# Patient Record
Sex: FEMALE | Race: WHITE | Employment: OTHER | URBAN - METROPOLITAN AREA
[De-identification: names, ages, dates, MRNs, and addresses within clinical notes are randomized per-mention and may not be internally consistent; named-entity substitution may affect disease eponyms.]

---

## 2017-03-15 ENCOUNTER — HOSPITAL ENCOUNTER (OUTPATIENT)
Dept: CT IMAGING | Age: 72
Discharge: HOME | End: 2017-03-15
Attending: INTERNAL MEDICINE
Payer: MEDICARE

## 2017-03-15 DIAGNOSIS — M79.671 PAIN OF RIGHT HEEL: ICD-10-CM

## 2017-03-15 PROCEDURE — 73700 CT LOWER EXTREMITY W/O DYE: CPT

## 2017-03-19 NOTE — PROGRESS NOTES
Please call patient, heel spur and achilles tendonitis. Will make an orthopedics consultation. No fractures. If dramatically improved, she can defer consult, but since this issue had been ongoing for  A while now, this my be of some help.

## 2017-04-26 ENCOUNTER — HOSPITAL ENCOUNTER (OUTPATIENT)
Dept: PHYSICAL THERAPY | Age: 72
Discharge: HOME | End: 2017-04-26
Payer: MEDICARE

## 2017-04-26 PROCEDURE — 97161 PT EVAL LOW COMPLEX 20 MIN: CPT

## 2017-04-26 PROCEDURE — G8978 MOBILITY CURRENT STATUS: HCPCS

## 2017-04-26 PROCEDURE — 97016 VASOPNEUMATIC DEVICE THERAPY: CPT

## 2017-04-26 PROCEDURE — G8979 MOBILITY GOAL STATUS: HCPCS

## 2017-04-26 NOTE — PROGRESS NOTES
Ambulatory/Rehab Services H2 Model Falls Risk Assessment    Risk Factor Pts. ·   Confusion/Disorientation/Impulsivity  []    4 ·   Symptomatic Depression  []   2 ·   Altered Elimination  []   1 ·   Dizziness/Vertigo  []   1 ·   Gender (Male)  []   1 ·   Any administered antiepileptics (anticonvulsants):  []   2 ·   Any administered benzodiazepines:  []   1 ·   Visual Impairment (specify):  []   1 ·   Portable Oxygen Use  []   1 ·   Orthostatic ? BP  []   1 ·   History of Recent Falls (within 3 mos.)  []   5     Ability to Rise from Chair (choose one) Pts. ·   Ability to rise in a single movement  [x]   0 ·   Pushes up, successful in one attempt  []   1 ·   Multiple attempts, but successful  []   3 ·   Unable to rise without assistance  []   4   Total: (5 or greater = High Risk) 0     Falls Prevention Plan:   []                Physical Limitations to Exercise (specify):   []                Mobility Assistance Device (type):   []                Exercise/Equipment Adaptation (specify):    ©2010 [facility] [address] Patent #[id number].  Federal Law prohibits the replication, distribution or use without written permission from [facility] [employer]

## 2017-04-26 NOTE — PROGRESS NOTES
[place]  : 1945 [facility] at [facility]  [address], [address], [place] [address]  [phone]   [phone]          OUTPATIENT PHYSICAL THERAPY:Initial Assessment 2017    ICD-10: Treatment Diagnosis: Pain in right foot (M79.671); Difficulty in walking, not elsewhere classified (R26.2)  Precautions/Allergies:   Pcn [penicillins]   Fall Risk Score: 0 (? 5 = High Risk)  MD Orders: evaluate and treat MEDICAL/REFERRING DIAGNOSIS:  pain in right foot   DATE OF ONSET: Chronic   REFERRING PHYSICIAN: Mariia Johnson MD  RETURN PHYSICIAN APPOINTMENT: 2 weeks     INITIAL ASSESSMENT:  Ms. Ephraim Cook presents in therapy today with reports of R foot/cayla pain and imaging diagnosis of heel spur and calcific tendinitis of Achilles. She will benefit from skilled PT in order to return to her previous and full level of functional mobility. PROBLEM LIST (Impacting functional limitations):  1. Decreased Strength  2. Decreased ADL/Functional Activities  3. Decreased Ambulation Ability/Technique  4. Decreased Balance  5. Increased Pain  6. Decreased Activity Tolerance  7. Decreased Flexibility/Joint Mobility  8. Decreased [place] with Home Exercise Program INTERVENTIONS PLANNED:  1. Balance Exercise  2. Cold  3. Gait Training  4. Heat  5. Home Exercise Program (HEP)  6. Manual Therapy  7. Neuromuscular Re-education/Strengthening  8. Range of Motion (ROM)  9. Therapeutic Activites  10. Therapeutic Exercise/Strengthening   TREATMENT PLAN:  Effective Dates: 17 TO 17. Frequency/Duration: 2 times a week for 6 weeks  GOALS: (Goals have been discussed and agreed upon with patient.)    SHORT-TERM FUNCTIONAL GOALS: Time Frame: 3 weeks  1. Patient will be compliant with HEP focused on lower extremity strengthening and ROM. 2.  Patient will rate R LE pain no greater than 3-4/10 for improved tolerance to daily and work duties.      DISCHARGE GOALS: Time Frame: 6 weeks  1. Patient will be independent with comprehensive HEP focused on continued performance of lower extremity strengthening and ROM activities. 2.  Patient will rate R LE pain no greater than 1-2/10 and which does not significantly interfere with daily or work duties for return to previous level of function. 3.  Patient will demonstrate near symmetrical bilateral LE AROM for optimum functional mobility with daily and work duties. 4.  Patient will demonstrate near symmetrical bilateral LE strength grades in the above tested planes for optimum performance and safety with daily and work duties. Rehabilitation Potential For Stated Goals: [address] therapy, I certify that the treatment plan above will be carried out by a therapist or under their direction. Thank you for this referral,  Ryan Powell, PT, DPT     Referring Physician Signature: Rommel Correa MD              Date                    The information in this section was collected on 4-26-17 (except where otherwise noted). HISTORY:   History of Present Injury/Illness (Reason for Referral):  Patient reports very chronic history of R foot/ankle pain, which was worsening until referral to specialist. She saw Dr. Karly Smith and was placed in a boot after CT imaging revealed Achilles calcific tendinitis and a heel spur. She states this boot has significantly decreased her symptoms. Past Medical History/Comorbidities:   Ms. Santhosh Cannon  has a past medical history of Abnormal Pap smear of cervix; Arrhythmia; Bacteremia; Cardiac pacemaker (7/15/2016); Chronic atrial fibrillation ([address] (2/17/2016); Chronic renal failure; Chronic systolic heart failure ([address] Dyslipidemia; Endocarditis; Heart failure ([address] History of aortic valve replacement with bioprosthetic valve (7/15/2016); History of prosthetic heart valve (2010); bacterial endocarditis (2012); OTHER MEDICAL;  Hypertension; Mechanical heart valve present (2001); Pacemaker (2010); Permanent atrial fibrillation ([facility] and Tricuspid valve disorder,tr,ti,ts non rhe. Ms. Saranya Short  has a past surgical history that includes cardiac surg procedure unlist; pacemaker; tonsillectomy; aortic valve replacement (2009); mitral valve replacement (2001); and other surgical.  Social History/Living Environment:     Independent   Prior Level of Function/Work/Activity:  Independent and active   Personal Factors:          Sex:  female        Age:  80 y.o. Current Medications:       Current Outpatient Prescriptions:     diclofenac (VOLTAREN) 1 % gel, Apply  to affected area three (3) times daily as needed. , Disp: 1 Each, Rfl: 0    warfarin (JANTOVEN) 5 mg tablet, 1/2-1 Tablet As Directed, Disp: 90 Tab, Rfl: 3    carvedilol (COREG) 12.5 mg tablet, Take 1 Tab by mouth two (2) times a day., Disp: 180 Tab, Rfl: 3    furosemide (LASIX) 20 mg tablet, 1 qd, Disp: 90 Tab, Rfl: 3    potassium chloride SR (KLOR-CON 10) 10 mEq tablet, Take 1 Tab by mouth daily. , Disp: 90 Tab, Rfl: 3    losartan (COZAAR) 25 mg tablet, Take 1 Tab by mouth daily. , Disp: 90 Tab, Rfl: 3    cefadroxil (DURICEF) 500 mg capsule, Take 1 Cap by mouth two (2) times a day., Disp: 180 Cap, Rfl: 3    levothyroxine (SYNTHROID) 50 mcg tablet, Take 1 Tab by mouth Daily (before breakfast). , Disp: 90 Tab, Rfl: 3    omega-3 fatty acids-vitamin e (FISH OIL) 1,000 mg cap, Take 1 Cap by mouth daily. , Disp: , Rfl:     multivitamins-minerals-lutein (CENTRUM SILVER) tab, Take  by mouth daily. , Disp: , Rfl:     aspirin delayed-release 81 mg tablet, Take 81 mg by mouth daily. , Disp: , Rfl:    Date Last Reviewed:  4/26/2017     Number of Personal Factors/Comorbidities that affect the Plan of Care: 0: LOW COMPLEXITY   EXAMINATION:   Observation/Orthostatic Postural Assessment:          Patient ambulates in R boot, appears in healthy condition, good gait speed;  No significant edema or redness present at R foot/ankle   Palpation:          Mild tenderness at distal R Achilles, negative windlass and good ray mobility  ROM:          75% of normal in all directions at R foot/ankle  Strength:          4+/5 in all directions at R foot/ankle    Body Structures Involved:  1. Nerves  2. Bones  3. Joints  4. Muscles Body Functions Affected:  1. Sensory/Pain  2. Movement Related Activities and Participation Affected:  1. General Tasks and Demands  2. Mobility  3. Self Care  4. Domestic Life  5. Interpersonal Interactions and Relationships  6. Community, Social and [place]   Number of elements (examined above) that affect the Plan of Care: 1-2: LOW COMPLEXITY   CLINICAL PRESENTATION:   Presentation: Stable and uncomplicated: LOW COMPLEXITY   CLINICAL DECISION MAKING:   Outcome Measure: Tool Used: PT/OT FOOT AND ANKLE ABILITY MEASURE  Score:  Initial: 66 Most Recent: X (Date: -- )   Interpretation of Score: For the \"Activities of Daily Living\", there are 21 questions each scored on a 5 point scale with 0 representing \"Unable to do\" and 4 representing \"No difficulty\". The lower the score, the greater the functional disability. 84/84 represents no disability. Minimal detectable change is 5.7 points. With the addition of the 8 questions in the \"Sports Subscale,\" there are 29 questions, each scored on a 5 point scale with 0 representing \"Unable to do\" and 4 representing \"No difficulty\". The lower the score, the greater the functional disability. 116/116 represents no disability. Minimal detectable change is 12.3 points. Activities of Daily Living:  Score 84 83-68 67-51 50-34 33-18 17-1 0   Modifier CH CI CJ CK CL CM CN     Activities of Daily Living + Sports Subscale:  Score 116 115-94 93-71 70-47 46-24 23-1 0   Modifier CH CI CJ CK CL CM CN     ?  Mobility - Walking and Moving Around:     - CURRENT STATUS: CJ - 20%-39% impaired, limited or restricted    - GOAL STATUS: CI - 1%-19% impaired, limited or restricted    - D/C STATUS:  ---------------To be determined---------------    Medical Necessity:   · Patient is expected to demonstrate progress in strength and range of motion to improve safety during functional mobility. Reason for Services/Other Comments:  · Patient continues to require skilled intervention due to not reaching long term goals. Use of outcome tool(s) and clinical judgement create a POC that gives a: Clear prediction of patient's progress: LOW COMPLEXITY            TREATMENT:   (In addition to Assessment/Re-Assessment sessions the following treatments were rendered)  Pre-treatment Symptoms/Complaints:  Patient reports tenderness at R achilles, and difficulty with prolonged walking and activity. She wears a R boot which she states has helped her pain a lot. She rates pain as 4-5/10. No sensory deficits reported. Pain: Initial:     4/10 Post Session:  2-3/10     THERAPEUTIC EXERCISE: (0 minutes):  Exercises per grid below to improve mobility and strength. Required minimal verbal cues to promote proper body alignment, promote proper body posture and promote proper body mechanics. Progressed complexity of movement as indicated. Date:   Date:   Date:     Activity/Exercise Parameters Parameters Parameters                                               Patient received vasopneumatic compression via [employer] to R foot/ankle, medium compression, 38 degrees, 15 minutes, to reduce pain and inflammation at affected LE, patient supine and responded very well to treatment    Treatment/Session Assessment:    · Response to Treatment:  Patient responded well to vasopneumatic compression today, and verbalized understanding of HEP consisting of calf stretching and ankle strengthening. · Compliance with Program/Exercises: Will assess as treatment progresses. · Recommendations/Intent for next treatment session: \"Next visit will focus on advancements to more challenging activities\".   Total Treatment Duration:  PT Patient Time In/Time Out  Time In: 1000  Time Out: [address] [facility] [address], PT, DPT

## 2017-05-01 ENCOUNTER — HOSPITAL ENCOUNTER (OUTPATIENT)
Dept: PHYSICAL THERAPY | Age: 72
Discharge: HOME | End: 2017-05-01
Payer: MEDICARE

## 2017-05-01 PROCEDURE — 97110 THERAPEUTIC EXERCISES: CPT

## 2017-05-01 PROCEDURE — 97140 MANUAL THERAPY 1/> REGIONS: CPT

## 2017-05-01 NOTE — PROGRESS NOTES
[place]  : 1945 [facility] Center at [facility]  [address], [address], [place]  [phone]   [phone]          OUTPATIENT PHYSICAL THERAPY:Daily Note 2017    ICD-10: Treatment Diagnosis: Pain in right foot (M79.671); Difficulty in walking, not elsewhere classified (R26.2)  Precautions/Allergies:   Pcn [penicillins]   Fall Risk Score: 0 (? 5 = High Risk)  MD Orders: evaluate and treat MEDICAL/REFERRING DIAGNOSIS:  pain in right foot   DATE OF ONSET: Chronic   REFERRING PHYSICIAN: Janet Farnsworth MD  RETURN PHYSICIAN APPOINTMENT: 2 weeks     INITIAL ASSESSMENT:  Ms. Yan Calvo presents in therapy today with reports of R foot/cayla pain and imaging diagnosis of heel spur and calcific tendinitis of Achilles. She will benefit from skilled PT in order to return to her previous and full level of functional mobility. PROBLEM LIST (Impacting functional limitations):  1. Decreased Strength  2. Decreased ADL/Functional Activities  3. Decreased Ambulation Ability/Technique  4. Decreased Balance  5. Increased Pain  6. Decreased Activity Tolerance  7. Decreased Flexibility/Joint Mobility  8. Decreased [place] with Home Exercise Program INTERVENTIONS PLANNED:  1. Balance Exercise  2. Cold  3. Gait Training  4. Heat  5. Home Exercise Program (HEP)  6. Manual Therapy  7. Neuromuscular Re-education/Strengthening  8. Range of Motion (ROM)  9. Therapeutic Activites  10. Therapeutic Exercise/Strengthening   TREATMENT PLAN:  Effective Dates: 17 TO 17. Frequency/Duration: 2 times a week for 6 weeks  GOALS: (Goals have been discussed and agreed upon with patient.)    SHORT-TERM FUNCTIONAL GOALS: Time Frame: 3 weeks  1. Patient will be compliant with HEP focused on lower extremity strengthening and ROM. 2.  Patient will rate R LE pain no greater than 3-4/10 for improved tolerance to daily and work duties.      DISCHARGE GOALS: Time Frame: 6 weeks  1. Patient will be independent with comprehensive HEP focused on continued performance of lower extremity strengthening and ROM activities. 2.  Patient will rate R LE pain no greater than 1-2/10 and which does not significantly interfere with daily or work duties for return to previous level of function. 3.  Patient will demonstrate near symmetrical bilateral LE AROM for optimum functional mobility with daily and work duties. 4.  Patient will demonstrate near symmetrical bilateral LE strength grades in the above tested planes for optimum performance and safety with daily and work duties. Rehabilitation Potential For Stated Goals: [facility] [address] therapy, I certify that the treatment plan above will be carried out by a therapist or under their direction. Thank you for this referral,  Michael Craig, PT, DPT     Referring Physician Signature: Jean-Paul Jacobo MD              Date                    The information in this section was collected on 4-26-17 (except where otherwise noted). HISTORY:   History of Present Injury/Illness (Reason for Referral):  Patient reports very chronic history of R foot/ankle pain, which was worsening until referral to specialist. She saw Dr. Judith Weston and was placed in a boot after CT imaging revealed Achilles calcific tendinitis and a heel spur. She states this boot has significantly decreased her symptoms. Past Medical History/Comorbidities:   Ms. Rios Aguilera  has a past medical history of Abnormal Pap smear of cervix; Arrhythmia; Bacteremia; Cardiac pacemaker (7/15/2016); Chronic atrial fibrillation ([address] (2/17/2016); Chronic renal failure; Chronic systolic heart failure ([address] Dyslipidemia; Endocarditis; Heart failure ([address] History of aortic valve replacement with bioprosthetic valve (7/15/2016); History of prosthetic heart valve (2010); bacterial endocarditis (2012); OTHER MEDICAL;  Hypertension; Mechanical heart valve present (2001); Pacemaker (2010); Permanent atrial fibrillation ([facility] and Tricuspid valve disorder,tr,ti,ts non rhe. Ms. Yan Calvo  has a past surgical history that includes cardiac surg procedure unlist; pacemaker; tonsillectomy; aortic valve replacement (2009); mitral valve replacement (2001); and other surgical.  Social History/Living Environment:     Independent   Prior Level of Function/Work/Activity:  Independent and active   Personal Factors:          Sex:  female        Age:  80 y.o. Current Medications:       Current Outpatient Prescriptions:     diclofenac (VOLTAREN) 1 % gel, Apply  to affected area three (3) times daily as needed. , Disp: 1 Each, Rfl: 0    warfarin (JANTOVEN) 5 mg tablet, 1/2-1 Tablet As Directed, Disp: 90 Tab, Rfl: 3    carvedilol (COREG) 12.5 mg tablet, Take 1 Tab by mouth two (2) times a day., Disp: 180 Tab, Rfl: 3    furosemide (LASIX) 20 mg tablet, 1 qd, Disp: 90 Tab, Rfl: 3    potassium chloride SR (KLOR-CON 10) 10 mEq tablet, Take 1 Tab by mouth daily. , Disp: 90 Tab, Rfl: 3    losartan (COZAAR) 25 mg tablet, Take 1 Tab by mouth daily. , Disp: 90 Tab, Rfl: 3    cefadroxil (DURICEF) 500 mg capsule, Take 1 Cap by mouth two (2) times a day., Disp: 180 Cap, Rfl: 3    levothyroxine (SYNTHROID) 50 mcg tablet, Take 1 Tab by mouth Daily (before breakfast). , Disp: 90 Tab, Rfl: 3    omega-3 fatty acids-vitamin e (FISH OIL) 1,000 mg cap, Take 1 Cap by mouth daily. , Disp: , Rfl:     multivitamins-minerals-lutein (CENTRUM SILVER) tab, Take  by mouth daily. , Disp: , Rfl:     aspirin delayed-release 81 mg tablet, Take 81 mg by mouth daily. , Disp: , Rfl:    Date Last Reviewed:  5/1/2017     Number of Personal Factors/Comorbidities that affect the Plan of Care: 0: LOW COMPLEXITY   EXAMINATION:   Observation/Orthostatic Postural Assessment:          Patient ambulates in R boot, appears in healthy condition, good gait speed;  No significant edema or redness present at R foot/ankle   Palpation:          Mild tenderness at distal R Achilles, negative windlass and good ray mobility  ROM:          75% of normal in all directions at R foot/ankle  Strength:          4+/5 in all directions at R foot/ankle    Body Structures Involved:  1. Nerves  2. Bones  3. Joints  4. Muscles Body Functions Affected:  1. Sensory/Pain  2. Movement Related Activities and Participation Affected:  1. General Tasks and Demands  2. Mobility  3. Self Care  4. Domestic Life  5. Interpersonal Interactions and Relationships  6. Community, Social and [place]   Number of elements (examined above) that affect the Plan of Care: 1-2: LOW COMPLEXITY   CLINICAL PRESENTATION:   Presentation: Stable and uncomplicated: LOW COMPLEXITY   CLINICAL DECISION MAKING:   Outcome Measure: Tool Used: PT/OT FOOT AND ANKLE ABILITY MEASURE  Score:  Initial: 66 Most Recent: X (Date: -- )   Interpretation of Score: For the \"Activities of Daily Living\", there are 21 questions each scored on a 5 point scale with 0 representing \"Unable to do\" and 4 representing \"No difficulty\". The lower the score, the greater the functional disability. 84/84 represents no disability. Minimal detectable change is 5.7 points. With the addition of the 8 questions in the \"Sports Subscale,\" there are 29 questions, each scored on a 5 point scale with 0 representing \"Unable to do\" and 4 representing \"No difficulty\". The lower the score, the greater the functional disability. 116/116 represents no disability. Minimal detectable change is 12.3 points. Activities of Daily Living:  Score 84 83-68 67-51 50-34 33-18 17-1 0   Modifier CH CI CJ CK CL CM CN     Activities of Daily Living + Sports Subscale:  Score 116 115-94 93-71 70-47 46-24 23-1 0   Modifier CH CI CJ CK CL CM CN     ?  Mobility - Walking and Moving Around:     - CURRENT STATUS: CJ - 20%-39% impaired, limited or restricted    - GOAL STATUS: CI - 1%-19% impaired, limited or restricted    - D/C STATUS:  ---------------To be determined---------------    Medical Necessity:   · Patient is expected to demonstrate progress in strength and range of motion to improve safety during functional mobility. Reason for Services/Other Comments:  · Patient continues to require skilled intervention due to not reaching long term goals. Use of outcome tool(s) and clinical judgement create a POC that gives a: Clear prediction of patient's progress: LOW COMPLEXITY            TREATMENT:   (In addition to Assessment/Re-Assessment sessions the following treatments were rendered)  Pre-treatment Symptoms/Complaints:  Patient reports she is doing well, but notices stiffness and discomfort with standing calf stretch against wall. Pain: Initial:     4/10 Post Session:  2-3/10     THERAPEUTIC EXERCISE: (15 minutes):  Exercises per grid below to improve mobility and strength. Required minimal verbal cues to promote proper body alignment, promote proper body posture and promote proper body mechanics. Progressed complexity of movement as indicated. Date:  5-1-17 Date:   Date:     Activity/Exercise Parameters Parameters Parameters   Stepper   8 minutes  Level 1  Cool down     Ankle t-band    Black band   x20 each             Step-ups                                   Manual therapy (30 minutes): Patient received AP and PA talocrural joint mobilizations, grades II-III, x5 each direction for 30 seconds; Patient received manual STM to calf and achilles tendon, 10 minutes, patient prone, to reduce stiffness and pain at the affected LE      Treatment/Session Assessment:    · Response to Treatment:  Patient did well with therapy today, reporting improved pain following manual therapy. Will continue to progress as tolerated. · Compliance with Program/Exercises: Will assess as treatment progresses. · Recommendations/Intent for next treatment session: \"Next visit will focus on advancements to more challenging activities\".   Total Treatment Duration:  PT Patient Time In/Time Out  Time In: 1115  Time Out: [address], PT, DPT

## 2017-05-05 ENCOUNTER — HOSPITAL ENCOUNTER (OUTPATIENT)
Dept: PHYSICAL THERAPY | Age: 72
Discharge: HOME | End: 2017-05-05
Payer: MEDICARE

## 2017-05-05 PROCEDURE — 97140 MANUAL THERAPY 1/> REGIONS: CPT

## 2017-05-05 PROCEDURE — 97110 THERAPEUTIC EXERCISES: CPT

## 2017-05-05 NOTE — PROGRESS NOTES
[place]  : 1945 Therapy Center at [facility]  [address], [address], [place] [address]  [phone]   [phone]          OUTPATIENT PHYSICAL THERAPY:Daily Note 2017    ICD-10: Treatment Diagnosis: Pain in right foot (M79.671); Difficulty in walking, not elsewhere classified (R26.2)  Precautions/Allergies:   Pcn [penicillins]   Fall Risk Score: 0 (? 5 = High Risk)  MD Orders: evaluate and treat MEDICAL/REFERRING DIAGNOSIS:  pain in right foot   DATE OF ONSET: Chronic   REFERRING PHYSICIAN: Peter Olivares MD  RETURN PHYSICIAN APPOINTMENT: 2 weeks     INITIAL ASSESSMENT:  Ms. Trice Chao presents in therapy today with reports of R foot/cayla pain and imaging diagnosis of heel spur and calcific tendinitis of Achilles. She will benefit from skilled PT in order to return to her previous and full level of functional mobility. PROBLEM LIST (Impacting functional limitations):  1. Decreased Strength  2. Decreased ADL/Functional Activities  3. Decreased Ambulation Ability/Technique  4. Decreased Balance  5. Increased Pain  6. Decreased Activity Tolerance  7. Decreased Flexibility/Joint Mobility  8. Decreased [place] with Home Exercise Program INTERVENTIONS PLANNED:  1. Balance Exercise  2. Cold  3. Gait Training  4. Heat  5. Home Exercise Program (HEP)  6. Manual Therapy  7. Neuromuscular Re-education/Strengthening  8. Range of Motion (ROM)  9. Therapeutic Activites  10. Therapeutic Exercise/Strengthening   TREATMENT PLAN:  Effective Dates: 17 TO 17. Frequency/Duration: 2 times a week for 6 weeks  GOALS: (Goals have been discussed and agreed upon with patient.)    SHORT-TERM FUNCTIONAL GOALS: Time Frame: 3 weeks  1. Patient will be compliant with HEP focused on lower extremity strengthening and ROM. 2.  Patient will rate R LE pain no greater than 3-4/10 for improved tolerance to daily and work duties.      DISCHARGE GOALS: Time Frame: 6 weeks  1. Patient will be independent with comprehensive HEP focused on continued performance of lower extremity strengthening and ROM activities. 2.  Patient will rate R LE pain no greater than 1-2/10 and which does not significantly interfere with daily or work duties for return to previous level of function. 3.  Patient will demonstrate near symmetrical bilateral LE AROM for optimum functional mobility with daily and work duties. 4.  Patient will demonstrate near symmetrical bilateral LE strength grades in the above tested planes for optimum performance and safety with daily and work duties. Rehabilitation Potential For Stated Goals: [address] therapy, I certify that the treatment plan above will be carried out by a therapist or under their direction. Thank you for this referral,  Roberta Rossi, PT, DPT     Referring Physician Signature: Pily Topete MD              Date                    The information in this section was collected on 4-26-17 (except where otherwise noted). HISTORY:   History of Present Injury/Illness (Reason for Referral):  Patient reports very chronic history of R foot/ankle pain, which was worsening until referral to specialist. She saw Dr. Aaron Alexandre and was placed in a boot after CT imaging revealed Achilles calcific tendinitis and a heel spur. She states this boot has significantly decreased her symptoms. Past Medical History/Comorbidities:   Ms. Juan José Baker  has a past medical history of Abnormal Pap smear of cervix; Arrhythmia; Bacteremia; Cardiac pacemaker (7/15/2016); Chronic atrial fibrillation ([address] (2/17/2016); Chronic renal failure; Chronic systolic heart failure ([address] Dyslipidemia; Endocarditis; Heart failure ([address] History of aortic valve replacement with bioprosthetic valve (7/15/2016); History of prosthetic heart valve (2010); bacterial endocarditis (2012); OTHER MEDICAL;  Hypertension; Mechanical heart valve present (2001); Pacemaker (2010); Permanent atrial fibrillation ([facility] [address] and Tricuspid valve disorder,tr,ti,ts non rhe. Ms. Robbie Moreno  has a past surgical history that includes cardiac surg procedure unlist; pacemaker; tonsillectomy; aortic valve replacement (2009); mitral valve replacement (2001); and other surgical.  Social History/Living Environment:     Independent   Prior Level of Function/Work/Activity:  Independent and active   Personal Factors:          Sex:  female        Age:  80 y.o. Current Medications:       Current Outpatient Prescriptions:     diclofenac (VOLTAREN) 1 % gel, Apply  to affected area three (3) times daily as needed. , Disp: 1 Each, Rfl: 0    warfarin (JANTOVEN) 5 mg tablet, 1/2-1 Tablet As Directed, Disp: 90 Tab, Rfl: 3    carvedilol (COREG) 12.5 mg tablet, Take 1 Tab by mouth two (2) times a day., Disp: 180 Tab, Rfl: 3    furosemide (LASIX) 20 mg tablet, 1 qd, Disp: 90 Tab, Rfl: 3    potassium chloride SR (KLOR-CON 10) 10 mEq tablet, Take 1 Tab by mouth daily. , Disp: 90 Tab, Rfl: 3    losartan (COZAAR) 25 mg tablet, Take 1 Tab by mouth daily. , Disp: 90 Tab, Rfl: 3    cefadroxil (DURICEF) 500 mg capsule, Take 1 Cap by mouth two (2) times a day., Disp: 180 Cap, Rfl: 3    levothyroxine (SYNTHROID) 50 mcg tablet, Take 1 Tab by mouth Daily (before breakfast). , Disp: 90 Tab, Rfl: 3    omega-3 fatty acids-vitamin e (FISH OIL) 1,000 mg cap, Take 1 Cap by mouth daily. , Disp: , Rfl:     multivitamins-minerals-lutein (CENTRUM SILVER) tab, Take  by mouth daily. , Disp: , Rfl:     aspirin delayed-release 81 mg tablet, Take 81 mg by mouth daily. , Disp: , Rfl:    Date Last Reviewed:  5/5/2017     Number of Personal Factors/Comorbidities that affect the Plan of Care: 0: LOW COMPLEXITY   EXAMINATION:   Observation/Orthostatic Postural Assessment:          Patient ambulates in R boot, appears in healthy condition, good gait speed;  No significant edema or redness present at R foot/ankle   Palpation:          Mild tenderness at distal R Achilles, negative windlass and good ray mobility  ROM:          75% of normal in all directions at R foot/ankle  Strength:          4+/5 in all directions at R foot/ankle    Body Structures Involved:  1. Nerves  2. Bones  3. Joints  4. Muscles Body Functions Affected:  1. Sensory/Pain  2. Movement Related Activities and Participation Affected:  1. General Tasks and Demands  2. Mobility  3. Self Care  4. Domestic Life  5. Interpersonal Interactions and Relationships  6. Community, Social and [place]   Number of elements (examined above) that affect the Plan of Care: 1-2: LOW COMPLEXITY   CLINICAL PRESENTATION:   Presentation: Stable and uncomplicated: LOW COMPLEXITY   CLINICAL DECISION MAKING:   Outcome Measure: Tool Used: PT/OT FOOT AND ANKLE ABILITY MEASURE  Score:  Initial: 66 Most Recent: X (Date: -- )   Interpretation of Score: For the \"Activities of Daily Living\", there are 21 questions each scored on a 5 point scale with 0 representing \"Unable to do\" and 4 representing \"No difficulty\". The lower the score, the greater the functional disability. 84/84 represents no disability. Minimal detectable change is 5.7 points. With the addition of the 8 questions in the \"Sports Subscale,\" there are 29 questions, each scored on a 5 point scale with 0 representing \"Unable to do\" and 4 representing \"No difficulty\". The lower the score, the greater the functional disability. 116/116 represents no disability. Minimal detectable change is 12.3 points. Activities of Daily Living:  Score 84 83-68 67-51 50-34 33-18 17-1 0   Modifier CH CI CJ CK CL CM CN     Activities of Daily Living + Sports Subscale:  Score 116 115-94 93-71 70-47 46-24 23-1 0   Modifier CH CI CJ CK CL CM CN     ?  Mobility - Walking and Moving Around:     - CURRENT STATUS: CJ - 20%-39% impaired, limited or restricted    - GOAL STATUS: CI - 1%-19% impaired, limited or restricted    - D/C STATUS:  ---------------To be determined---------------    Medical Necessity:   · Patient is expected to demonstrate progress in strength and range of motion to improve safety during functional mobility. Reason for Services/Other Comments:  · Patient continues to require skilled intervention due to not reaching long term goals. Use of outcome tool(s) and clinical judgement create a POC that gives a: Clear prediction of patient's progress: LOW COMPLEXITY            TREATMENT:   (In addition to Assessment/Re-Assessment sessions the following treatments were rendered)  Pre-treatment Symptoms/Complaints:  Patient reports she had a little bit more swelling after the last session but it calmed down and she feels pretty good. Pain: Initial:     3/10 Post Session:  2/10     THERAPEUTIC EXERCISE: (10 minutes):  Exercises per grid below to improve mobility and strength. Required minimal verbal cues to promote proper body alignment, promote proper body posture and promote proper body mechanics. Progressed complexity of movement as indicated. Date:  5-1-17 Date:  5-5-17 Date:     Activity/Exercise Parameters Parameters Parameters   Stepper   8 minutes  Level 1  Cool down Instructed to start doing this at the gym    Ankle t-band    Black band   x20 each -    Ankle isometrics    PF, foot in neutral position, 5 sec hold (50% force), 10x    Step-ups                   Manual therapy (20 minutes): Patient received AP and PA talocrural joint mobilizations, grades II-III, x5 each direction for 30 seconds; calcaneal distraction with achilles stretch at several points along the achilles (pain free range) Patient received manual STM to calf tendon in supine and prone    Modalities: (15 min) to reduce swelling   · Ice x 15 min to achilles and posterior calcaneous   Treatment/Session Assessment:    · Response to Treatment:  Patient tolerated the session well.  She still has some retrocalcaneal swelling so her intensity was not increased. She was advised that none of her exercises should cause increased swelling. She was advised to ice 1-2x/day   · Compliance with Program/Exercises: Will assess as treatment progresses. · Recommendations/Intent for next treatment session: \"Next visit will focus on advancements to more challenging activities\".   Total Treatment Duration: 45 min  PT Patient Time In/Time Out  Time In: 0900  Time Out: 0945    Elliot Singh, PT, DPT

## 2017-05-08 ENCOUNTER — HOSPITAL ENCOUNTER (OUTPATIENT)
Dept: PHYSICAL THERAPY | Age: 72
Discharge: HOME | End: 2017-05-08
Payer: MEDICARE

## 2017-05-08 PROCEDURE — 97110 THERAPEUTIC EXERCISES: CPT

## 2017-05-08 PROCEDURE — 97140 MANUAL THERAPY 1/> REGIONS: CPT

## 2017-05-08 NOTE — PROGRESS NOTES
[place]  : 1945 Therapy Center at [facility]  [address], [address], [place] [address]  [phone]   [phone]          OUTPATIENT PHYSICAL THERAPY:Daily Note 2017    ICD-10: Treatment Diagnosis: Pain in right foot (M79.671); Difficulty in walking, not elsewhere classified (R26.2)  Precautions/Allergies:   Pcn [penicillins]   Fall Risk Score: 0 (? 5 = High Risk)  MD Orders: evaluate and treat MEDICAL/REFERRING DIAGNOSIS:  pain in right foot   DATE OF ONSET: Chronic   REFERRING PHYSICIAN: Adrian Gallegos MD  RETURN PHYSICIAN APPOINTMENT: 2 weeks     INITIAL ASSESSMENT:  Ms. Keily Dias presents in therapy today with reports of R foot/cayla pain and imaging diagnosis of heel spur and calcific tendinitis of Achilles. She will benefit from skilled PT in order to return to her previous and full level of functional mobility. PROBLEM LIST (Impacting functional limitations):  1. Decreased Strength  2. Decreased ADL/Functional Activities  3. Decreased Ambulation Ability/Technique  4. Decreased Balance  5. Increased Pain  6. Decreased Activity Tolerance  7. Decreased Flexibility/Joint Mobility  8. Decreased [place] with Home Exercise Program INTERVENTIONS PLANNED:  1. Balance Exercise  2. Cold  3. Gait Training  4. Heat  5. Home Exercise Program (HEP)  6. Manual Therapy  7. Neuromuscular Re-education/Strengthening  8. Range of Motion (ROM)  9. Therapeutic Activites  10. Therapeutic Exercise/Strengthening   TREATMENT PLAN:  Effective Dates: 17 TO 17. Frequency/Duration: 2 times a week for 6 weeks  GOALS: (Goals have been discussed and agreed upon with patient.)    SHORT-TERM FUNCTIONAL GOALS: Time Frame: 3 weeks  1. Patient will be compliant with HEP focused on lower extremity strengthening and ROM. 2.  Patient will rate R LE pain no greater than 3-4/10 for improved tolerance to daily and work duties.      DISCHARGE GOALS: Time Frame: 6 weeks  1. Patient will be independent with comprehensive HEP focused on continued performance of lower extremity strengthening and ROM activities. 2.  Patient will rate R LE pain no greater than 1-2/10 and which does not significantly interfere with daily or work duties for return to previous level of function. 3.  Patient will demonstrate near symmetrical bilateral LE AROM for optimum functional mobility with daily and work duties. 4.  Patient will demonstrate near symmetrical bilateral LE strength grades in the above tested planes for optimum performance and safety with daily and work duties. Rehabilitation Potential For Stated Goals: [facility] [address] [address] therapy, I certify that the treatment plan above will be carried out by a therapist or under their direction. Thank you for this referral,  Emelia Kim, PT, DPT     Referring Physician Signature: Charlie Scales MD              Date                    The information in this section was collected on 4-26-17 (except where otherwise noted). HISTORY:   History of Present Injury/Illness (Reason for Referral):  Patient reports very chronic history of R foot/ankle pain, which was worsening until referral to specialist. She saw Dr. Silvia Bowman and was placed in a boot after CT imaging revealed Achilles calcific tendinitis and a heel spur. She states this boot has significantly decreased her symptoms. Past Medical History/Comorbidities:   Ms. Felicia Claire  has a past medical history of Abnormal Pap smear of cervix; Arrhythmia; Bacteremia; Cardiac pacemaker (7/15/2016); Chronic atrial fibrillation ([address] (2/17/2016); Chronic renal failure; Chronic systolic heart failure ([address] Dyslipidemia; Endocarditis; Heart failure ([address] History of aortic valve replacement with bioprosthetic valve (7/15/2016); History of prosthetic heart valve (2010); bacterial endocarditis (2012); OTHER MEDICAL;  Hypertension; Mechanical heart valve present (2001); Pacemaker (2010); Permanent atrial fibrillation ([facility] and Tricuspid valve disorder,tr,ti,ts non rhe. Ms. Aquiles Torres  has a past surgical history that includes cardiac surg procedure unlist; pacemaker; tonsillectomy; aortic valve replacement (2009); mitral valve replacement (2001); and other surgical.  Social History/Living Environment:     Independent   Prior Level of Function/Work/Activity:  Independent and active   Personal Factors:          Sex:  female        Age:  80 y.o. Current Medications:       Current Outpatient Prescriptions:     diclofenac (VOLTAREN) 1 % gel, Apply  to affected area three (3) times daily as needed. , Disp: 1 Each, Rfl: 0    warfarin (JANTOVEN) 5 mg tablet, 1/2-1 Tablet As Directed, Disp: 90 Tab, Rfl: 3    carvedilol (COREG) 12.5 mg tablet, Take 1 Tab by mouth two (2) times a day., Disp: 180 Tab, Rfl: 3    furosemide (LASIX) 20 mg tablet, 1 qd, Disp: 90 Tab, Rfl: 3    potassium chloride SR (KLOR-CON 10) 10 mEq tablet, Take 1 Tab by mouth daily. , Disp: 90 Tab, Rfl: 3    losartan (COZAAR) 25 mg tablet, Take 1 Tab by mouth daily. , Disp: 90 Tab, Rfl: 3    cefadroxil (DURICEF) 500 mg capsule, Take 1 Cap by mouth two (2) times a day., Disp: 180 Cap, Rfl: 3    levothyroxine (SYNTHROID) 50 mcg tablet, Take 1 Tab by mouth Daily (before breakfast). , Disp: 90 Tab, Rfl: 3    omega-3 fatty acids-vitamin e (FISH OIL) 1,000 mg cap, Take 1 Cap by mouth daily. , Disp: , Rfl:     multivitamins-minerals-lutein (CENTRUM SILVER) tab, Take  by mouth daily. , Disp: , Rfl:     aspirin delayed-release 81 mg tablet, Take 81 mg by mouth daily. , Disp: , Rfl:    Date Last Reviewed:  5/8/2017     Number of Personal Factors/Comorbidities that affect the Plan of Care: 0: LOW COMPLEXITY   EXAMINATION:   Observation/Orthostatic Postural Assessment:          Patient ambulates in R boot, appears in healthy condition, good gait speed;  No significant edema or redness present at R foot/ankle   Palpation:          Mild tenderness at distal R Achilles, negative windlass and good ray mobility  ROM:          75% of normal in all directions at R foot/ankle  Strength:          4+/5 in all directions at R foot/ankle    Body Structures Involved:  1. Nerves  2. Bones  3. Joints  4. Muscles Body Functions Affected:  1. Sensory/Pain  2. Movement Related Activities and Participation Affected:  1. General Tasks and Demands  2. Mobility  3. Self Care  4. Domestic Life  5. Interpersonal Interactions and Relationships  6. Community, Social and [place]   Number of elements (examined above) that affect the Plan of Care: 1-2: LOW COMPLEXITY   CLINICAL PRESENTATION:   Presentation: Stable and uncomplicated: LOW COMPLEXITY   CLINICAL DECISION MAKING:   Outcome Measure: Tool Used: PT/OT FOOT AND ANKLE ABILITY MEASURE  Score:  Initial: 66 Most Recent: X (Date: -- )   Interpretation of Score: For the \"Activities of Daily Living\", there are 21 questions each scored on a 5 point scale with 0 representing \"Unable to do\" and 4 representing \"No difficulty\". The lower the score, the greater the functional disability. 84/84 represents no disability. Minimal detectable change is 5.7 points. With the addition of the 8 questions in the \"Sports Subscale,\" there are 29 questions, each scored on a 5 point scale with 0 representing \"Unable to do\" and 4 representing \"No difficulty\". The lower the score, the greater the functional disability. 116/116 represents no disability. Minimal detectable change is 12.3 points. Activities of Daily Living:  Score 84 83-68 67-51 50-34 33-18 17-1 0   Modifier CH CI CJ CK CL CM CN     Activities of Daily Living + Sports Subscale:  Score 116 115-94 93-71 70-47 46-24 23-1 0   Modifier CH CI CJ CK CL CM CN     ?  Mobility - Walking and Moving Around:     - CURRENT STATUS: CJ - 20%-39% impaired, limited or restricted    - GOAL STATUS: CI - 1%-19% impaired, limited or restricted    - D/C STATUS:  ---------------To be determined---------------    Medical Necessity:   · Patient is expected to demonstrate progress in strength and range of motion to improve safety during functional mobility. Reason for Services/Other Comments:  · Patient continues to require skilled intervention due to not reaching long term goals. Use of outcome tool(s) and clinical judgement create a POC that gives a: Clear prediction of patient's progress: LOW COMPLEXITY            TREATMENT:   (In addition to Assessment/Re-Assessment sessions the following treatments were rendered)  Pre-treatment Symptoms/Complaints:  Patient reports she has less swelling with decreasing her intensity of HEP performance. Pain: Initial:     3/10 Post Session:  2/10     THERAPEUTIC EXERCISE: (10 minutes):  Exercises per grid below to improve mobility and strength. Required minimal verbal cues to promote proper body alignment, promote proper body posture and promote proper body mechanics. Progressed complexity of movement as indicated. Date:  5-1-17 Date:  5-5-17 Date:  5-8-17   Activity/Exercise Parameters Parameters Parameters   Stepper   8 minutes  Level 1  Cool down Instructed to start doing this at the gym 8 minutes  Level 1   Ankle t-band    Black band   x20 each -    Ankle isometrics    PF, foot in neutral position, 5 sec hold (50% force), 10x PF, foot in neutral position, 5 sec hold (50% force), 10x   Step-ups                   Manual therapy (25 minutes): Patient received AP and PA talocrural joint mobilizations, grades II-III, x5 each direction for 30 seconds; calcaneal distraction with achilles stretch at several points along the achilles (pain free range) Patient received manual STM to calf tendon in supine and prone    Modalities: (10 min) to reduce swelling   · Ice x 10 min to achilles and posterior calcaneous   Treatment/Session Assessment:    · Response to Treatment:  Patient tolerated the session well.  She still has some retrocalcaneal swelling so her intensity was not increased. She was advised that none of her exercises should cause increased swelling. She was advised to ice 1-2x/day   · Compliance with Program/Exercises: Will assess as treatment progresses. · Recommendations/Intent for next treatment session: \"Next visit will focus on advancements to more challenging activities\".   Total Treatment Duration: 45 min  PT Patient Time In/Time Out  Time In: 1115  Time Out: Beth 61, PT, DPT

## 2017-05-10 ENCOUNTER — HOSPITAL ENCOUNTER (OUTPATIENT)
Dept: PHYSICAL THERAPY | Age: 72
Discharge: HOME | End: 2017-05-10
Payer: MEDICARE

## 2017-05-10 PROCEDURE — 97110 THERAPEUTIC EXERCISES: CPT

## 2017-05-10 PROCEDURE — 97140 MANUAL THERAPY 1/> REGIONS: CPT

## 2017-05-10 NOTE — PROGRESS NOTES
[place]  : 1945 Therapy Center at [facility]  [address], [address], [place] [address]  [phone]   [phone]          OUTPATIENT PHYSICAL THERAPY:Daily Note 5/10/2017    ICD-10: Treatment Diagnosis: Pain in right foot (M79.671); Difficulty in walking, not elsewhere classified (R26.2)  Precautions/Allergies:   Pcn [penicillins]   Fall Risk Score: 0 (? 5 = High Risk)  MD Orders: evaluate and treat MEDICAL/REFERRING DIAGNOSIS:  pain in right foot   DATE OF ONSET: Chronic   REFERRING PHYSICIAN: Solomon Cole MD  RETURN PHYSICIAN APPOINTMENT: 2 weeks     INITIAL ASSESSMENT:  Ms. Les De Jesus presents in therapy today with reports of R foot/cayla pain and imaging diagnosis of heel spur and calcific tendinitis of Achilles. She will benefit from skilled PT in order to return to her previous and full level of functional mobility. PROBLEM LIST (Impacting functional limitations):  1. Decreased Strength  2. Decreased ADL/Functional Activities  3. Decreased Ambulation Ability/Technique  4. Decreased Balance  5. Increased Pain  6. Decreased Activity Tolerance  7. Decreased Flexibility/Joint Mobility  8. Decreased [place] with Home Exercise Program INTERVENTIONS PLANNED:  1. Balance Exercise  2. Cold  3. Gait Training  4. Heat  5. Home Exercise Program (HEP)  6. Manual Therapy  7. Neuromuscular Re-education/Strengthening  8. Range of Motion (ROM)  9. Therapeutic Activites  10. Therapeutic Exercise/Strengthening   TREATMENT PLAN:  Effective Dates: 17 TO 17. Frequency/Duration: 2 times a week for 6 weeks  GOALS: (Goals have been discussed and agreed upon with patient.)    SHORT-TERM FUNCTIONAL GOALS: Time Frame: 3 weeks  1. Patient will be compliant with HEP focused on lower extremity strengthening and ROM. 2.  Patient will rate R LE pain no greater than 3-4/10 for improved tolerance to daily and work duties.      DISCHARGE GOALS: Time Frame: 6 weeks  1. Patient will be independent with comprehensive HEP focused on continued performance of lower extremity strengthening and ROM activities. 2.  Patient will rate R LE pain no greater than 1-2/10 and which does not significantly interfere with daily or work duties for return to previous level of function. 3.  Patient will demonstrate near symmetrical bilateral LE AROM for optimum functional mobility with daily and work duties. 4.  Patient will demonstrate near symmetrical bilateral LE strength grades in the above tested planes for optimum performance and safety with daily and work duties. Rehabilitation Potential For Stated Goals: [address] therapy, I certify that the treatment plan above will be carried out by a therapist or under their direction. Thank you for this referral,  Chelsea Wyatt, PT, DPT     Referring Physician Signature: Kee Morales MD              Date                    The information in this section was collected on 4-26-17 (except where otherwise noted). HISTORY:   History of Present Injury/Illness (Reason for Referral):  Patient reports very chronic history of R foot/ankle pain, which was worsening until referral to specialist. She saw Dr. Corky Anthony and was placed in a boot after CT imaging revealed Achilles calcific tendinitis and a heel spur. She states this boot has significantly decreased her symptoms. Past Medical History/Comorbidities:   Ms. Estrella Lyles  has a past medical history of Abnormal Pap smear of cervix; Arrhythmia; Bacteremia; Cardiac pacemaker (7/15/2016); Chronic atrial fibrillation ([address] (2/17/2016); Chronic renal failure; Chronic systolic heart failure ([address] Dyslipidemia; Endocarditis; Heart failure ([address] History of aortic valve replacement with bioprosthetic valve (7/15/2016); History of prosthetic heart valve (2010); bacterial endocarditis (2012); OTHER MEDICAL;  Hypertension; Mechanical heart valve present (2001); Pacemaker (2010); Permanent atrial fibrillation [address] and Tricuspid valve disorder,tr,ti,ts non rhe. Ms. Juan José Baker  has a past surgical history that includes cardiac surg procedure unlist; pacemaker; tonsillectomy; aortic valve replacement (2009); mitral valve replacement (2001); and other surgical.  Social History/Living Environment:     Independent   Prior Level of Function/Work/Activity:  Independent and active   Personal Factors:          Sex:  female        Age:  80 y.o. Current Medications:       Current Outpatient Prescriptions:     diclofenac (VOLTAREN) 1 % gel, Apply  to affected area three (3) times daily as needed. , Disp: 1 Each, Rfl: 0    warfarin (JANTOVEN) 5 mg tablet, 1/2-1 Tablet As Directed, Disp: 90 Tab, Rfl: 3    carvedilol (COREG) 12.5 mg tablet, Take 1 Tab by mouth two (2) times a day., Disp: 180 Tab, Rfl: 3    furosemide (LASIX) 20 mg tablet, 1 qd, Disp: 90 Tab, Rfl: 3    potassium chloride SR (KLOR-CON 10) 10 mEq tablet, Take 1 Tab by mouth daily. , Disp: 90 Tab, Rfl: 3    losartan (COZAAR) 25 mg tablet, Take 1 Tab by mouth daily. , Disp: 90 Tab, Rfl: 3    cefadroxil (DURICEF) 500 mg capsule, Take 1 Cap by mouth two (2) times a day., Disp: 180 Cap, Rfl: 3    levothyroxine (SYNTHROID) 50 mcg tablet, Take 1 Tab by mouth Daily (before breakfast). , Disp: 90 Tab, Rfl: 3    omega-3 fatty acids-vitamin e (FISH OIL) 1,000 mg cap, Take 1 Cap by mouth daily. , Disp: , Rfl:     multivitamins-minerals-lutein (CENTRUM SILVER) tab, Take  by mouth daily. , Disp: , Rfl:     aspirin delayed-release 81 mg tablet, Take 81 mg by mouth daily. , Disp: , Rfl:    Date Last Reviewed:  5/10/2017     Number of Personal Factors/Comorbidities that affect the Plan of Care: 0: LOW COMPLEXITY   EXAMINATION:   Observation/Orthostatic Postural Assessment:          Patient ambulates in R boot, appears in healthy condition, good gait speed;  No significant edema or redness present at R foot/ankle   Palpation:          Mild tenderness at distal R Achilles, negative windlass and good ray mobility  ROM:          75% of normal in all directions at R foot/ankle  Strength:          4+/5 in all directions at R foot/ankle    Body Structures Involved:  1. Nerves  2. Bones  3. Joints  4. Muscles Body Functions Affected:  1. Sensory/Pain  2. Movement Related Activities and Participation Affected:  1. General Tasks and Demands  2. Mobility  3. Self Care  4. Domestic Life  5. Interpersonal Interactions and Relationships  6. Community, Social and [place]   Number of elements (examined above) that affect the Plan of Care: 1-2: LOW COMPLEXITY   CLINICAL PRESENTATION:   Presentation: Stable and uncomplicated: LOW COMPLEXITY   CLINICAL DECISION MAKING:   Outcome Measure: Tool Used: PT/OT FOOT AND ANKLE ABILITY MEASURE  Score:  Initial: 66 Most Recent: X (Date: -- )   Interpretation of Score: For the \"Activities of Daily Living\", there are 21 questions each scored on a 5 point scale with 0 representing \"Unable to do\" and 4 representing \"No difficulty\". The lower the score, the greater the functional disability. 84/84 represents no disability. Minimal detectable change is 5.7 points. With the addition of the 8 questions in the \"Sports Subscale,\" there are 29 questions, each scored on a 5 point scale with 0 representing \"Unable to do\" and 4 representing \"No difficulty\". The lower the score, the greater the functional disability. 116/116 represents no disability. Minimal detectable change is 12.3 points. Activities of Daily Living:  Score 84 83-68 67-51 50-34 33-18 17-1 0   Modifier CH CI CJ CK CL CM CN     Activities of Daily Living + Sports Subscale:  Score 116 115-94 93-71 70-47 46-24 23-1 0   Modifier CH CI CJ CK CL CM CN     ?  Mobility - Walking and Moving Around:     - CURRENT STATUS: CJ - 20%-39% impaired, limited or restricted    - GOAL STATUS: CI - 1%-19% impaired, limited or restricted    - D/C STATUS:  ---------------To be determined---------------    Medical Necessity:   · Patient is expected to demonstrate progress in strength and range of motion to improve safety during functional mobility. Reason for Services/Other Comments:  · Patient continues to require skilled intervention due to not reaching long term goals. Use of outcome tool(s) and clinical judgement create a POC that gives a: Clear prediction of patient's progress: LOW COMPLEXITY            TREATMENT:   (In addition to Assessment/Re-Assessment sessions the following treatments were rendered)  Pre-treatment Symptoms/Complaints:  Patient reports she has less swelling with decreasing her intensity of HEP performance. Pain: Initial:     3/10 Post Session:  2/10     THERAPEUTIC EXERCISE: (10 minutes):  Exercises per grid below to improve mobility and strength. Required minimal verbal cues to promote proper body alignment, promote proper body posture and promote proper body mechanics. Progressed complexity of movement as indicated. Date:  5-1-17 Date:  5-5-17 Date:  5-10-17   Activity/Exercise Parameters Parameters Parameters   Stepper   8 minutes  Level 1  Cool down Instructed to start doing this at the gym 8 minutes  Level 1   Ankle t-band    Black band   x20 each -    Ankle isometrics    PF, foot in neutral position, 5 sec hold (50% force), 10x PF, foot in neutral position, 5 sec hold (50% force), 10x   Step-ups                   Manual therapy (25 minutes): Patient received AP and PA talocrural joint mobilizations, grades II-III, x5 each direction for 30 seconds; calcaneal distraction with achilles stretch at several points along the achilles (pain free range) Patient received manual STM to calf tendon in supine and prone    Modalities: (10 min) to reduce swelling   · Ice x 10 min to achilles and posterior calcaneous   Treatment/Session Assessment:    · Response to Treatment:  Patient tolerated the session well.  She still has some retrocalcaneal swelling so her intensity was not increased. She was advised that none of her exercises should cause increased swelling. She was advised to ice 1-2x/day   · Compliance with Program/Exercises: Will assess as treatment progresses. · Recommendations/Intent for next treatment session: \"Next visit will focus on advancements to more challenging activities\".   Total Treatment Duration: 45 min  PT Patient Time In/Time Out  Time In: 1030  Time Out: 1115    Edison Eubanks, PT, DPT

## 2017-05-15 ENCOUNTER — HOSPITAL ENCOUNTER (OUTPATIENT)
Dept: PHYSICAL THERAPY | Age: 72
Discharge: HOME | End: 2017-05-15
Payer: MEDICARE

## 2017-05-15 PROCEDURE — 97110 THERAPEUTIC EXERCISES: CPT

## 2017-05-15 PROCEDURE — 97140 MANUAL THERAPY 1/> REGIONS: CPT

## 2017-05-15 PROCEDURE — 97016 VASOPNEUMATIC DEVICE THERAPY: CPT

## 2017-05-15 NOTE — PROGRESS NOTES
[place]  : 1945 Therapy Center at [facility] beds at the [facility]  [address], [address], [place] [address]  [phone]   [phone]          OUTPATIENT PHYSICAL THERAPY:Daily Note 5/15/2017    ICD-10: Treatment Diagnosis: Pain in right foot (M79.671); Difficulty in walking, not elsewhere classified (R26.2)  Precautions/Allergies:   Pcn [penicillins]   Fall Risk Score: 0 (? 5 = High Risk)  MD Orders: evaluate and treat MEDICAL/REFERRING DIAGNOSIS:  pain in right foot   DATE OF ONSET: Chronic   REFERRING PHYSICIAN: Alfredo Ross MD  RETURN PHYSICIAN APPOINTMENT: 2 weeks     INITIAL ASSESSMENT:  Ms. Pelon Da Silva presents in therapy today with reports of R foot/cayla pain and imaging diagnosis of heel spur and calcific tendinitis of Achilles. She will benefit from skilled PT in order to return to her previous and full level of functional mobility. PROBLEM LIST (Impacting functional limitations):  1. Decreased Strength  2. Decreased ADL/Functional Activities  3. Decreased Ambulation Ability/Technique  4. Decreased Balance  5. Increased Pain  6. Decreased Activity Tolerance  7. Decreased Flexibility/Joint Mobility  8. Decreased [place] with Home Exercise Program INTERVENTIONS PLANNED:  1. Balance Exercise  2. Cold  3. Gait Training  4. Heat  5. Home Exercise Program (HEP)  6. Manual Therapy  7. Neuromuscular Re-education/Strengthening  8. Range of Motion (ROM)  9. Therapeutic Activites  10. Therapeutic Exercise/Strengthening   TREATMENT PLAN:  Effective Dates: 17 TO 17. Frequency/Duration: 2 times a week for 6 weeks  GOALS: (Goals have been discussed and agreed upon with patient.)    SHORT-TERM FUNCTIONAL GOALS: Time Frame: 3 weeks  1. Patient will be compliant with HEP focused on lower extremity strengthening and ROM. 2.  Patient will rate R LE pain no greater than 3-4/10 for improved tolerance to daily and work duties.      DISCHARGE GOALS: Time Frame: 6 weeks  1. Patient will be independent with comprehensive HEP focused on continued performance of lower extremity strengthening and ROM activities. 2.  Patient will rate R LE pain no greater than 1-2/10 and which does not significantly interfere with daily or work duties for return to previous level of function. 3.  Patient will demonstrate near symmetrical bilateral LE AROM for optimum functional mobility with daily and work duties. 4.  Patient will demonstrate near symmetrical bilateral LE strength grades in the above tested planes for optimum performance and safety with daily and work duties. Rehabilitation Potential For Stated Goals: [facility] [address] [address] therapy, I certify that the treatment plan above will be carried out by a therapist or under their direction. Thank you for this referral,  Cipriano Monroe, PT, DPT     Referring Physician Signature: Rodrick Aknis MD              Date                    The information in this section was collected on 4-26-17 (except where otherwise noted). HISTORY:   History of Present Injury/Illness (Reason for Referral):  Patient reports very chronic history of R foot/ankle pain, which was worsening until referral to specialist. She saw Dr. David Worthington and was placed in a boot after CT imaging revealed Achilles calcific tendinitis and a heel spur. She states this boot has significantly decreased her symptoms. Past Medical History/Comorbidities:   Ms. Alba Roblero  has a past medical history of Abnormal Pap smear of cervix; Arrhythmia; Bacteremia; Cardiac pacemaker (7/15/2016); Chronic atrial fibrillation ([address] (2/17/2016); Chronic renal failure; Chronic systolic heart failure ([address] Dyslipidemia; Endocarditis; Heart failure ([address] History of aortic valve replacement with bioprosthetic valve (7/15/2016); History of prosthetic heart valve (2010); bacterial endocarditis (2012); OTHER MEDICAL;  Hypertension; Mechanical heart valve present (2001); Pacemaker (2010); Permanent atrial fibrillation ([facility] and Tricuspid valve disorder,tr,ti,ts non rhe. Ms. Indira Spring  has a past surgical history that includes cardiac surg procedure unlist; pacemaker; tonsillectomy; aortic valve replacement (2009); mitral valve replacement (2001); and other surgical.  Social History/Living Environment:     Independent   Prior Level of Function/Work/Activity:  Independent and active   Personal Factors:          Sex:  female        Age:  80 y.o. Current Medications:       Current Outpatient Prescriptions:     diclofenac (VOLTAREN) 1 % gel, Apply  to affected area three (3) times daily as needed. , Disp: 1 Each, Rfl: 0    warfarin (JANTOVEN) 5 mg tablet, 1/2-1 Tablet As Directed, Disp: 90 Tab, Rfl: 3    carvedilol (COREG) 12.5 mg tablet, Take 1 Tab by mouth two (2) times a day., Disp: 180 Tab, Rfl: 3    furosemide (LASIX) 20 mg tablet, 1 qd, Disp: 90 Tab, Rfl: 3    potassium chloride SR (KLOR-CON 10) 10 mEq tablet, Take 1 Tab by mouth daily. , Disp: 90 Tab, Rfl: 3    losartan (COZAAR) 25 mg tablet, Take 1 Tab by mouth daily. , Disp: 90 Tab, Rfl: 3    cefadroxil (DURICEF) 500 mg capsule, Take 1 Cap by mouth two (2) times a day., Disp: 180 Cap, Rfl: 3    levothyroxine (SYNTHROID) 50 mcg tablet, Take 1 Tab by mouth Daily (before breakfast). , Disp: 90 Tab, Rfl: 3    omega-3 fatty acids-vitamin e (FISH OIL) 1,000 mg cap, Take 1 Cap by mouth daily. , Disp: , Rfl:     multivitamins-minerals-lutein (CENTRUM SILVER) tab, Take  by mouth daily. , Disp: , Rfl:     aspirin delayed-release 81 mg tablet, Take 81 mg by mouth daily. , Disp: , Rfl:    Date Last Reviewed:  5/15/2017     Number of Personal Factors/Comorbidities that affect the Plan of Care: 0: LOW COMPLEXITY   EXAMINATION:   Observation/Orthostatic Postural Assessment:          Patient ambulates in R boot, appears in healthy condition, good gait speed;  No significant edema or redness present at R foot/ankle   Palpation:          Mild tenderness at distal R Achilles, negative windlass and good ray mobility  ROM:          75% of normal in all directions at R foot/ankle  Strength:          4+/5 in all directions at R foot/ankle    Body Structures Involved:  1. Nerves  2. Bones  3. Joints  4. Muscles Body Functions Affected:  1. Sensory/Pain  2. Movement Related Activities and Participation Affected:  1. General Tasks and Demands  2. Mobility  3. Self Care  4. Domestic Life  5. Interpersonal Interactions and Relationships  6. Community, Social and [place]   Number of elements (examined above) that affect the Plan of Care: 1-2: LOW COMPLEXITY   CLINICAL PRESENTATION:   Presentation: Stable and uncomplicated: LOW COMPLEXITY   CLINICAL DECISION MAKING:   Outcome Measure: Tool Used: PT/OT FOOT AND ANKLE ABILITY MEASURE  Score:  Initial: 66 Most Recent: X (Date: -- )   Interpretation of Score: For the \"Activities of Daily Living\", there are 21 questions each scored on a 5 point scale with 0 representing \"Unable to do\" and 4 representing \"No difficulty\". The lower the score, the greater the functional disability. 84/84 represents no disability. Minimal detectable change is 5.7 points. With the addition of the 8 questions in the \"Sports Subscale,\" there are 29 questions, each scored on a 5 point scale with 0 representing \"Unable to do\" and 4 representing \"No difficulty\". The lower the score, the greater the functional disability. 116/116 represents no disability. Minimal detectable change is 12.3 points. Activities of Daily Living:  Score 84 83-68 67-51 50-34 33-18 17-1 0   Modifier CH CI CJ CK CL CM CN     Activities of Daily Living + Sports Subscale:  Score 116 115-94 93-71 70-47 46-24 23-1 0   Modifier CH CI CJ CK CL CM CN     ?  Mobility - Walking and Moving Around:     - CURRENT STATUS: CJ - 20%-39% impaired, limited or restricted    - GOAL STATUS: CI - 1%-19% impaired, limited or restricted    - D/C STATUS:  ---------------To be determined---------------    Medical Necessity:   · Patient is expected to demonstrate progress in strength and range of motion to improve safety during functional mobility. Reason for Services/Other Comments:  · Patient continues to require skilled intervention due to not reaching long term goals. Use of outcome tool(s) and clinical judgement create a POC that gives a: Clear prediction of patient's progress: LOW COMPLEXITY            TREATMENT:   (In addition to Assessment/Re-Assessment sessions the following treatments were rendered)  Pre-treatment Symptoms/Complaints:  Patient reports she saw Dr. Rei Alicea, who appreciated PT \"backing off\" the exercises when her swelling had increased. Wants 2-4 more weeks of PT, but was pleased with progress. Pain: Initial:     3/10 Post Session:  2/10     THERAPEUTIC EXERCISE: (15 minutes):  Exercises per grid below to improve mobility and strength. Required minimal verbal cues to promote proper body alignment, promote proper body posture and promote proper body mechanics. Progressed complexity of movement as indicated.    Date:  5-1-17 Date:  5-5-17 Date:  5-15-17   Activity/Exercise Parameters Parameters Parameters   Stepper   8 minutes  Level 1  Cool down Instructed to start doing this at the gym 8 minutes  Level 1   Ankle t-band    Black band   x20 each -    Ankle isometrics    PF, foot in neutral position, 5 sec hold (50% force), 10x PF, foot in neutral position, 5 sec hold (50% force), 10x   Step-ups                   Manual therapy (15 minutes): Patient received AP and PA talocrural joint mobilizations, grades II-III, x5 each direction for 30 seconds; calcaneal distraction with achilles stretch at several points along the achilles (pain free range) Patient received manual STM to calf tendon in supine and prone    · Modalities: Patient received vasopneumatic compression via [employer] to R foot, medium compression, 38 degrees, 15 minutes, to reduce edema and inflammation at the affected foot/ankle   ·   Treatment/Session Assessment:    · Response to Treatment:  Patient is responding well to current program. She feels like she can be discharged in 2 weeks, and is knowledgeable about how to progress safely and slowly. · Compliance with Program/Exercises: Will assess as treatment progresses. · Recommendations/Intent for next treatment session: \"Next visit will focus on advancements to more challenging activities\".   Total Treatment Duration: 45 min  PT Patient Time In/Time Out  Time In: 1115  Time Out: Beth 61, PT, DPT

## 2017-05-17 ENCOUNTER — HOSPITAL ENCOUNTER (OUTPATIENT)
Dept: PHYSICAL THERAPY | Age: 72
Discharge: HOME | End: 2017-05-17
Payer: MEDICARE

## 2017-05-17 PROCEDURE — 97140 MANUAL THERAPY 1/> REGIONS: CPT

## 2017-05-17 PROCEDURE — 97110 THERAPEUTIC EXERCISES: CPT

## 2017-05-17 NOTE — PROGRESS NOTES
[place]  : 1945 Therapy Center at [facility]  [address], [address], [place] [address]  [phone]   [phone]          OUTPATIENT PHYSICAL THERAPY:Daily Note 2017    ICD-10: Treatment Diagnosis: Pain in right foot (M79.671); Difficulty in walking, not elsewhere classified (R26.2)  Precautions/Allergies:   Pcn [penicillins]   Fall Risk Score: 0 (? 5 = High Risk)  MD Orders: evaluate and treat MEDICAL/REFERRING DIAGNOSIS:  pain in right foot   DATE OF ONSET: Chronic   REFERRING PHYSICIAN: Janet Farnsworth MD  RETURN PHYSICIAN APPOINTMENT: 2 weeks     INITIAL ASSESSMENT:  Ms. Yan Calvo presents in therapy today with reports of R foot/cayla pain and imaging diagnosis of heel spur and calcific tendinitis of Achilles. She will benefit from skilled PT in order to return to her previous and full level of functional mobility. PROBLEM LIST (Impacting functional limitations):  1. Decreased Strength  2. Decreased ADL/Functional Activities  3. Decreased Ambulation Ability/Technique  4. Decreased Balance  5. Increased Pain  6. Decreased Activity Tolerance  7. Decreased Flexibility/Joint Mobility  8. Decreased [place] with Home Exercise Program INTERVENTIONS PLANNED:  1. Balance Exercise  2. Cold  3. Gait Training  4. Heat  5. Home Exercise Program (HEP)  6. Manual Therapy  7. Neuromuscular Re-education/Strengthening  8. Range of Motion (ROM)  9. Therapeutic Activites  10. Therapeutic Exercise/Strengthening   TREATMENT PLAN:  Effective Dates: 17 TO 17. Frequency/Duration: 2 times a week for 6 weeks  GOALS: (Goals have been discussed and agreed upon with patient.)    SHORT-TERM FUNCTIONAL GOALS: Time Frame: 3 weeks  1. Patient will be compliant with HEP focused on lower extremity strengthening and ROM. 2.  Patient will rate R LE pain no greater than 3-4/10 for improved tolerance to daily and work duties.      DISCHARGE GOALS: Time Frame: 6 weeks  1. Patient will be independent with comprehensive HEP focused on continued performance of lower extremity strengthening and ROM activities. 2.  Patient will rate R LE pain no greater than 1-2/10 and which does not significantly interfere with daily or work duties for return to previous level of function. 3.  Patient will demonstrate near symmetrical bilateral LE AROM for optimum functional mobility with daily and work duties. 4.  Patient will demonstrate near symmetrical bilateral LE strength grades in the above tested planes for optimum performance and safety with daily and work duties. Rehabilitation Potential For Stated Goals: [address] therapy, I certify that the treatment plan above will be carried out by a therapist or under their direction. Thank you for this referral,  Georgiana Lama, PT, DPT     Referring Physician Signature: Christina Garcia MD              Date                    The information in this section was collected on 4-26-17 (except where otherwise noted). HISTORY:   History of Present Injury/Illness (Reason for Referral):  Patient reports very chronic history of R foot/ankle pain, which was worsening until referral to specialist. She saw Dr. Dayanara Lama and was placed in a boot after CT imaging revealed Achilles calcific tendinitis and a heel spur. She states this boot has significantly decreased her symptoms. Past Medical History/Comorbidities:   Ms. Shawn Haq  has a past medical history of Abnormal Pap smear of cervix; Arrhythmia; Bacteremia; Cardiac pacemaker (7/15/2016); Chronic atrial fibrillation ([address] (2/17/2016); Chronic renal failure; Chronic systolic heart failure ([address] Dyslipidemia; Endocarditis; Heart failure ([address] History of aortic valve replacement with bioprosthetic valve (7/15/2016); History of prosthetic heart valve (2010); bacterial endocarditis (2012); OTHER MEDICAL;  Hypertension; Mechanical heart valve present (2001); Pacemaker (2010); Permanent atrial fibrillation ([facility] and Tricuspid valve disorder,tr,ti,ts non rhe. Ms. Giancarlo Rojas  has a past surgical history that includes cardiac surg procedure unlist; pacemaker; tonsillectomy; aortic valve replacement (2009); mitral valve replacement (2001); and other surgical.  Social History/Living Environment:     Independent   Prior Level of Function/Work/Activity:  Independent and active   Personal Factors:          Sex:  female        Age:  80 y.o. Current Medications:       Current Outpatient Prescriptions:     diclofenac (VOLTAREN) 1 % gel, Apply  to affected area three (3) times daily as needed. , Disp: 1 Each, Rfl: 0    warfarin (JANTOVEN) 5 mg tablet, 1/2-1 Tablet As Directed, Disp: 90 Tab, Rfl: 3    carvedilol (COREG) 12.5 mg tablet, Take 1 Tab by mouth two (2) times a day., Disp: 180 Tab, Rfl: 3    furosemide (LASIX) 20 mg tablet, 1 qd, Disp: 90 Tab, Rfl: 3    potassium chloride SR (KLOR-CON 10) 10 mEq tablet, Take 1 Tab by mouth daily. , Disp: 90 Tab, Rfl: 3    losartan (COZAAR) 25 mg tablet, Take 1 Tab by mouth daily. , Disp: 90 Tab, Rfl: 3    cefadroxil (DURICEF) 500 mg capsule, Take 1 Cap by mouth two (2) times a day., Disp: 180 Cap, Rfl: 3    levothyroxine (SYNTHROID) 50 mcg tablet, Take 1 Tab by mouth Daily (before breakfast). , Disp: 90 Tab, Rfl: 3    omega-3 fatty acids-vitamin e (FISH OIL) 1,000 mg cap, Take 1 Cap by mouth daily. , Disp: , Rfl:     multivitamins-minerals-lutein (CENTRUM SILVER) tab, Take  by mouth daily. , Disp: , Rfl:     aspirin delayed-release 81 mg tablet, Take 81 mg by mouth daily. , Disp: , Rfl:    Date Last Reviewed:  5/17/2017     Number of Personal Factors/Comorbidities that affect the Plan of Care: 0: LOW COMPLEXITY   EXAMINATION:   Observation/Orthostatic Postural Assessment:          Patient ambulates in R boot, appears in healthy condition, good gait speed;  No significant edema or redness present at R foot/ankle   Palpation:          Mild tenderness at distal R Achilles, negative windlass and good ray mobility  ROM:          75% of normal in all directions at R foot/ankle  Strength:          4+/5 in all directions at R foot/ankle    Body Structures Involved:  1. Nerves  2. Bones  3. Joints  4. Muscles Body Functions Affected:  1. Sensory/Pain  2. Movement Related Activities and Participation Affected:  1. General Tasks and Demands  2. Mobility  3. Self Care  4. Domestic Life  5. Interpersonal Interactions and Relationships  6. Community, Social and [place]   Number of elements (examined above) that affect the Plan of Care: 1-2: LOW COMPLEXITY   CLINICAL PRESENTATION:   Presentation: Stable and uncomplicated: LOW COMPLEXITY   CLINICAL DECISION MAKING:   Outcome Measure: Tool Used: PT/OT FOOT AND ANKLE ABILITY MEASURE  Score:  Initial: 66 Most Recent: X (Date: -- )   Interpretation of Score: For the \"Activities of Daily Living\", there are 21 questions each scored on a 5 point scale with 0 representing \"Unable to do\" and 4 representing \"No difficulty\". The lower the score, the greater the functional disability. 84/84 represents no disability. Minimal detectable change is 5.7 points. With the addition of the 8 questions in the \"Sports Subscale,\" there are 29 questions, each scored on a 5 point scale with 0 representing \"Unable to do\" and 4 representing \"No difficulty\". The lower the score, the greater the functional disability. 116/116 represents no disability. Minimal detectable change is 12.3 points. Activities of Daily Living:  Score 84 83-68 67-51 50-34 33-18 17-1 0   Modifier CH CI CJ CK CL CM CN     Activities of Daily Living + Sports Subscale:  Score 116 115-94 93-71 70-47 46-24 23-1 0   Modifier CH CI CJ CK CL CM CN     ?  Mobility - Walking and Moving Around:     - CURRENT STATUS: CJ - 20%-39% impaired, limited or restricted    - GOAL STATUS: CI - 1%-19% impaired, limited or restricted    - D/C STATUS:  ---------------To be determined---------------    Medical Necessity:   · Patient is expected to demonstrate progress in strength and range of motion to improve safety during functional mobility. Reason for Services/Other Comments:  · Patient continues to require skilled intervention due to not reaching long term goals. Use of outcome tool(s) and clinical judgement create a POC that gives a: Clear prediction of patient's progress: LOW COMPLEXITY            TREATMENT:   (In addition to Assessment/Re-Assessment sessions the following treatments were rendered)  Pre-treatment Symptoms/Complaints:  Patient reports that she is doing pretty good. Still feels it but much less pain. Started to walk at home without the boot. Pain: Initial:     2/10 Post Session:  1/10     THERAPEUTIC EXERCISE: (15 minutes):  Exercises per grid below to improve mobility and strength. Required minimal verbal cues to promote proper body alignment, promote proper body posture and promote proper body mechanics. Progressed complexity of movement as indicated.    Date:  5-1-17 Date:  5-5-17 Date:  5-15-17 Date  5-17-17   Activity/Exercise Parameters Parameters Parameters    Education    · Issued a 6mm heel lift and discussed wearing shoes that have support a small heel  · Discussed walking without the boot for 5-10 min intervals, 2x/day as long as pain does not increase   Stepper   8 minutes  Level 1  Cool down Instructed to start doing this at the gym 8 minutes  Level 1 -   Ankle t-band    Black band   x20 each -  -   Ankle isometrics    PF, foot in neutral position, 5 sec hold (50% force), 10x PF, foot in neutral position, 5 sec hold (50% force), 10x · PF: foot in relative neural: full force: 10 sec, 10x  · Inversion: 10 sec, 10x   Step-ups       -              Manual therapy (30 minutes): Patient received AP and PA talocrural joint mobilizations, grades II-III, x5 each direction for 30 seconds; calcaneal distraction with achilles stretch at several points along the achilles (pain free range) Patient received manual STM to calf tendon in supine     · Modalities: Patient received ice x 15 min  ·   Treatment/Session Assessment:    · Response to Treatment:  Patient tolerated the session well. She was educated to start slow when walking without the boot and use the heel lift and good shoes. Her plan of care can be determined based on how she responds to this. · Compliance with Program/Exercises: Will assess as treatment progresses. · Recommendations/Intent for next treatment session: \"Next visit will focus on advancements to more challenging activities\".   Total Treatment Duration: 60 min  PT Patient Time In/Time Out  Time In: 1040  Time Out: [facility], PT, DPT

## 2017-05-23 ENCOUNTER — HOSPITAL ENCOUNTER (OUTPATIENT)
Dept: PHYSICAL THERAPY | Age: 72
Discharge: HOME | End: 2017-05-23
Payer: MEDICARE

## 2017-05-23 PROCEDURE — 97016 VASOPNEUMATIC DEVICE THERAPY: CPT

## 2017-05-23 PROCEDURE — 97140 MANUAL THERAPY 1/> REGIONS: CPT

## 2017-05-23 NOTE — PROGRESS NOTES
[place]  : 1945 Therapy Center at [facility]  [address], [address], [place] [address]  [phone]   [phone]          OUTPATIENT PHYSICAL THERAPY:Daily Note 2017    ICD-10: Treatment Diagnosis: Pain in right foot (M79.671); Difficulty in walking, not elsewhere classified (R26.2)  Precautions/Allergies:   Pcn [penicillins]   Fall Risk Score: 0 (? 5 = High Risk)  MD Orders: evaluate and treat MEDICAL/REFERRING DIAGNOSIS:  pain in right foot   DATE OF ONSET: Chronic   REFERRING PHYSICIAN: Charlie Scales MD  RETURN PHYSICIAN APPOINTMENT: 2 weeks     INITIAL ASSESSMENT:  Ms. Felicia Claire presents in therapy today with reports of R foot/cayla pain and imaging diagnosis of heel spur and calcific tendinitis of Achilles. She will benefit from skilled PT in order to return to her previous and full level of functional mobility. PROBLEM LIST (Impacting functional limitations):  1. Decreased Strength  2. Decreased ADL/Functional Activities  3. Decreased Ambulation Ability/Technique  4. Decreased Balance  5. Increased Pain  6. Decreased Activity Tolerance  7. Decreased Flexibility/Joint Mobility  8. Decreased [place] with Home Exercise Program INTERVENTIONS PLANNED:  1. Balance Exercise  2. Cold  3. Gait Training  4. Heat  5. Home Exercise Program (HEP)  6. Manual Therapy  7. Neuromuscular Re-education/Strengthening  8. Range of Motion (ROM)  9. Therapeutic Activites  10. Therapeutic Exercise/Strengthening   TREATMENT PLAN:  Effective Dates: 17 TO 17. Frequency/Duration: 2 times a week for 6 weeks  GOALS: (Goals have been discussed and agreed upon with patient.)    SHORT-TERM FUNCTIONAL GOALS: Time Frame: 3 weeks  1. Patient will be compliant with HEP focused on lower extremity strengthening and ROM. 2.  Patient will rate R LE pain no greater than 3-4/10 for improved tolerance to daily and work duties.      DISCHARGE GOALS: Time Frame: 6 weeks  1. Patient will be independent with comprehensive HEP focused on continued performance of lower extremity strengthening and ROM activities. 2.  Patient will rate R LE pain no greater than 1-2/10 and which does not significantly interfere with daily or work duties for return to previous level of function. 3.  Patient will demonstrate near symmetrical bilateral LE AROM for optimum functional mobility with daily and work duties. 4.  Patient will demonstrate near symmetrical bilateral LE strength grades in the above tested planes for optimum performance and safety with daily and work duties. Rehabilitation Potential For Stated Goals: [facility] [address] therapy, I certify that the treatment plan above will be carried out by a therapist or under their direction. Thank you for this referral,  Елена Sheriff, PT, DPT     Referring Physician Signature: Bruno Huston MD              Date                    The information in this section was collected on 4-26-17 (except where otherwise noted). HISTORY:   History of Present Injury/Illness (Reason for Referral):  Patient reports very chronic history of R foot/ankle pain, which was worsening until referral to specialist. She saw Dr. Justina Jolly and was placed in a boot after CT imaging revealed Achilles calcific tendinitis and a heel spur. She states this boot has significantly decreased her symptoms. Past Medical History/Comorbidities:   Ms. Salena Halsted  has a past medical history of Abnormal Pap smear of cervix; Arrhythmia; Bacteremia; Cardiac pacemaker (7/15/2016); Chronic atrial fibrillation ([address] (2/17/2016); Chronic renal failure; Chronic systolic heart failure ([address] Dyslipidemia; Endocarditis; Heart failure ([address] History of aortic valve replacement with bioprosthetic valve (7/15/2016); History of prosthetic heart valve (2010); bacterial endocarditis (2012); OTHER MEDICAL;  Hypertension; Mechanical heart valve present (2001); Pacemaker (2010); Permanent atrial fibrillation ([facility] and Tricuspid valve disorder,tr,ti,ts non rhe. Ms. Aquiles Torres  has a past surgical history that includes cardiac surg procedure unlist; pacemaker; tonsillectomy; aortic valve replacement (2009); mitral valve replacement (2001); and other surgical.  Social History/Living Environment:     Independent   Prior Level of Function/Work/Activity:  Independent and active   Personal Factors:          Sex:  female        Age:  80 y.o. Current Medications:       Current Outpatient Prescriptions:     diclofenac (VOLTAREN) 1 % gel, Apply  to affected area three (3) times daily as needed. , Disp: 1 Each, Rfl: 0    warfarin (JANTOVEN) 5 mg tablet, 1/2-1 Tablet As Directed, Disp: 90 Tab, Rfl: 3    carvedilol (COREG) 12.5 mg tablet, Take 1 Tab by mouth two (2) times a day., Disp: 180 Tab, Rfl: 3    furosemide (LASIX) 20 mg tablet, 1 qd, Disp: 90 Tab, Rfl: 3    potassium chloride SR (KLOR-CON 10) 10 mEq tablet, Take 1 Tab by mouth daily. , Disp: 90 Tab, Rfl: 3    losartan (COZAAR) 25 mg tablet, Take 1 Tab by mouth daily. , Disp: 90 Tab, Rfl: 3    cefadroxil (DURICEF) 500 mg capsule, Take 1 Cap by mouth two (2) times a day., Disp: 180 Cap, Rfl: 3    levothyroxine (SYNTHROID) 50 mcg tablet, Take 1 Tab by mouth Daily (before breakfast). , Disp: 90 Tab, Rfl: 3    omega-3 fatty acids-vitamin e (FISH OIL) 1,000 mg cap, Take 1 Cap by mouth daily. , Disp: , Rfl:     multivitamins-minerals-lutein (CENTRUM SILVER) tab, Take  by mouth daily. , Disp: , Rfl:     aspirin delayed-release 81 mg tablet, Take 81 mg by mouth daily. , Disp: , Rfl:    Date Last Reviewed:  5/23/2017     Number of Personal Factors/Comorbidities that affect the Plan of Care: 0: LOW COMPLEXITY   EXAMINATION:   Observation/Orthostatic Postural Assessment:          Patient ambulates in R boot, appears in healthy condition, good gait speed;  No significant edema or redness present at R foot/ankle   Palpation:          Mild tenderness at distal R Achilles, negative windlass and good ray mobility  ROM:          75% of normal in all directions at R foot/ankle  Strength:          4+/5 in all directions at R foot/ankle    Body Structures Involved:  1. Nerves  2. Bones  3. Joints  4. Muscles Body Functions Affected:  1. Sensory/Pain  2. Movement Related Activities and Participation Affected:  1. General Tasks and Demands  2. Mobility  3. Self Care  4. Domestic Life  5. Interpersonal Interactions and Relationships  6. Community, Social and [place]   Number of elements (examined above) that affect the Plan of Care: 1-2: LOW COMPLEXITY   CLINICAL PRESENTATION:   Presentation: Stable and uncomplicated: LOW COMPLEXITY   CLINICAL DECISION MAKING:   Outcome Measure: Tool Used: PT/OT FOOT AND ANKLE ABILITY MEASURE  Score:  Initial: 66 Most Recent: X (Date: -- )   Interpretation of Score: For the \"Activities of Daily Living\", there are 21 questions each scored on a 5 point scale with 0 representing \"Unable to do\" and 4 representing \"No difficulty\". The lower the score, the greater the functional disability. 84/84 represents no disability. Minimal detectable change is 5.7 points. With the addition of the 8 questions in the \"Sports Subscale,\" there are 29 questions, each scored on a 5 point scale with 0 representing \"Unable to do\" and 4 representing \"No difficulty\". The lower the score, the greater the functional disability. 116/116 represents no disability. Minimal detectable change is 12.3 points. Activities of Daily Living:  Score 84 83-68 67-51 50-34 33-18 17-1 0   Modifier CH CI CJ CK CL CM CN     Activities of Daily Living + Sports Subscale:  Score 116 115-94 93-71 70-47 46-24 23-1 0   Modifier CH CI CJ CK CL CM CN     ?  Mobility - Walking and Moving Around:     - CURRENT STATUS: CJ - 20%-39% impaired, limited or restricted    - GOAL STATUS: CI - 1%-19% impaired, limited or restricted    - D/C STATUS:  ---------------To be determined---------------    Medical Necessity:   · Patient is expected to demonstrate progress in strength and range of motion to improve safety during functional mobility. Reason for Services/Other Comments:  · Patient continues to require skilled intervention due to not reaching long term goals. Use of outcome tool(s) and clinical judgement create a POC that gives a: Clear prediction of patient's progress: LOW COMPLEXITY            TREATMENT:   (In addition to Assessment/Re-Assessment sessions the following treatments were rendered)  Pre-treatment Symptoms/Complaints:  Patient reports that she is doing pretty good. Had some increased swelling with prolonged walking/sitting this weekend, but iced and these symptoms subsided. Pain: Initial:     2/10 Post Session:  1/10     THERAPEUTIC EXERCISE: (0 minutes):  Exercises per grid below to improve mobility and strength. Required minimal verbal cues to promote proper body alignment, promote proper body posture and promote proper body mechanics. Progressed complexity of movement as indicated.    Date:  5-1-17 Date:  5-5-17 Date:  5-15-17 Date  5-17-17   Activity/Exercise Parameters Parameters Parameters    Education    · Issued a 6mm heel lift and discussed wearing shoes that have support a small heel  · Discussed walking without the boot for 5-10 min intervals, 2x/day as long as pain does not increase   Stepper   8 minutes  Level 1  Cool down Instructed to start doing this at the gym 8 minutes  Level 1 -   Ankle t-band    Black band   x20 each -  -   Ankle isometrics    PF, foot in neutral position, 5 sec hold (50% force), 10x PF, foot in neutral position, 5 sec hold (50% force), 10x · PF: foot in relative neural: full force: 10 sec, 10x  · Inversion: 10 sec, 10x   Step-ups       -              Manual therapy (30 minutes): Patient received AP and PA talocrural joint mobilizations, grades II-III, x5 each direction for 30 seconds; calcaneal distraction with achilles stretch at several points along the achilles (pain free range) Patient received manual STM to calf tendon in supine     · Modalities: Patient received vasopneumatic compression via [employer] to R foot/ankle, medium compression, 15 minutes, patient long sitting, to reduce swelling and pain at the affected LE  ·   Treatment/Session Assessment:    · Response to Treatment:  Patient tolerated the session well. She is progressing well, with minimal signs of increased symptoms and is self-managing her symptoms well. · Compliance with Program/Exercises: Will assess as treatment progresses. · Recommendations/Intent for next treatment session: \"Next visit will focus on advancements to more challenging activities\".   Total Treatment Duration: 60 min  PT Patient Time In/Time Out  Time In: 1115  Time Out: Beth [address], PT, DPT

## 2017-05-25 ENCOUNTER — HOSPITAL ENCOUNTER (OUTPATIENT)
Dept: PHYSICAL THERAPY | Age: 72
Discharge: HOME | End: 2017-05-25
Payer: MEDICARE

## 2017-05-25 PROCEDURE — 97016 VASOPNEUMATIC DEVICE THERAPY: CPT

## 2017-05-25 PROCEDURE — G8978 MOBILITY CURRENT STATUS: HCPCS

## 2017-05-25 PROCEDURE — 97140 MANUAL THERAPY 1/> REGIONS: CPT

## 2017-05-25 PROCEDURE — 97110 THERAPEUTIC EXERCISES: CPT

## 2017-05-25 PROCEDURE — G8979 MOBILITY GOAL STATUS: HCPCS

## 2017-05-25 NOTE — PROGRESS NOTES
[place]  : 1945 [facility] at [facility]  [address], [address], [place]  [phone]   [phone]          OUTPATIENT PHYSICAL THERAPY:Daily Note and Progress Report 2017    ICD-10: Treatment Diagnosis: Pain in right foot (M79.671); Difficulty in walking, not elsewhere classified (R26.2)  Precautions/Allergies:   Pcn [penicillins]   Fall Risk Score: 0 (? 5 = High Risk)  MD Orders: evaluate and treat MEDICAL/REFERRING DIAGNOSIS:  pain in right foot   DATE OF ONSET: Chronic   REFERRING PHYSICIAN: Krystian Figueroa MD  RETURN PHYSICIAN APPOINTMENT: 2 weeks     INITIAL ASSESSMENT:  Ms. Indira Spring initiated PT on 17 and has attended 9 out of 9 therapy sessions. She is progressing well in therapy, reporting decreased pain and improved gait. However, she does continue to report swelling, inflammation, and pain which prevents full functional mobility. She will benefit from continued skilled PT in order to reach full potential for gait and safety during ambulation/ADLs. PROBLEM LIST (Impacting functional limitations):  1. Decreased Strength  2. Decreased ADL/Functional Activities  3. Decreased Ambulation Ability/Technique  4. Decreased Balance  5. Increased Pain  6. Decreased Activity Tolerance  7. Decreased Flexibility/Joint Mobility  8. Decreased [place] with Home Exercise Program INTERVENTIONS PLANNED:  1. Balance Exercise  2. Cold  3. Gait Training  4. Heat  5. Home Exercise Program (HEP)  6. Manual Therapy  7. Neuromuscular Re-education/Strengthening  8. Range of Motion (ROM)  9. Therapeutic Activites  10. Therapeutic Exercise/Strengthening   TREATMENT PLAN:  Effective Dates: 17 TO 17. Frequency/Duration: 2 times a week for 4 weeks  GOALS: (Goals have been discussed and agreed upon with patient.)    SHORT-TERM FUNCTIONAL GOALS: Time Frame: 3 weeks  1.   Patient will be compliant with HEP focused on lower extremity strengthening and ROM. GOAL MET 5/25/2017  2. Patient will rate R LE pain no greater than 3-4/10 for improved tolerance to daily and work duties. GOAL MET 5/25/2017  DISCHARGE GOALS: Time Frame: 6 weeks  1. Patient will be independent with comprehensive HEP focused on continued performance of lower extremity strengthening and ROM activities. 2.  Patient will rate R LE pain no greater than 1-2/10 and which does not significantly interfere with daily or work duties for return to previous level of function. 3.  Patient will demonstrate near symmetrical bilateral LE AROM for optimum functional mobility with daily and work duties. 4.  Patient will demonstrate near symmetrical bilateral LE strength grades in the above tested planes for optimum performance and safety with daily and work duties. Rehabilitation Potential For Stated Goals: [facility] [address] therapy, I certify that the treatment plan above will be carried out by a therapist or under their direction. Thank you for this referral,  Pratik Labs, PT, DPT     Referring Physician Signature: Sheryle Percy, MD              Date                    The information in this section was collected on 4-26-17 (except where otherwise noted). HISTORY:   History of Present Injury/Illness (Reason for Referral):  Patient reports very chronic history of R foot/ankle pain, which was worsening until referral to specialist. She saw Dr. Leeann Sow and was placed in a boot after CT imaging revealed Achilles calcific tendinitis and a heel spur. She states this boot has significantly decreased her symptoms. Past Medical History/Comorbidities:   Ms. Jose Fitzgerald  has a past medical history of Abnormal Pap smear of cervix; Arrhythmia; Bacteremia; Cardiac pacemaker (7/15/2016); Chronic atrial fibrillation ([address] (2/17/2016); Chronic renal failure; Chronic systolic heart failure ([address] Dyslipidemia; Endocarditis; Heart failure ([address]  History of aortic valve replacement with bioprosthetic valve (7/15/2016); History of prosthetic heart valve (2010); bacterial endocarditis (2012); OTHER MEDICAL; Hypertension; Mechanical heart valve present (2001); Pacemaker (2010); Permanent atrial fibrillation [address] [address] and Tricuspid valve disorder,tr,ti,ts non rhe. Ms. Rios Aguilera  has a past surgical history that includes cardiac surg procedure unlist; pacemaker; tonsillectomy; aortic valve replacement (2009); mitral valve replacement (2001); and other surgical.  Social History/Living Environment:     Independent   Prior Level of Function/Work/Activity:  Independent and active   Personal Factors:          Sex:  female        Age:  80 y.o. Current Medications:       Current Outpatient Prescriptions:     diclofenac (VOLTAREN) 1 % gel, Apply  to affected area three (3) times daily as needed. , Disp: 1 Each, Rfl: 0    warfarin (JANTOVEN) 5 mg tablet, 1/2-1 Tablet As Directed, Disp: 90 Tab, Rfl: 3    carvedilol (COREG) 12.5 mg tablet, Take 1 Tab by mouth two (2) times a day., Disp: 180 Tab, Rfl: 3    furosemide (LASIX) 20 mg tablet, 1 qd, Disp: 90 Tab, Rfl: 3    potassium chloride SR (KLOR-CON 10) 10 mEq tablet, Take 1 Tab by mouth daily. , Disp: 90 Tab, Rfl: 3    losartan (COZAAR) 25 mg tablet, Take 1 Tab by mouth daily. , Disp: 90 Tab, Rfl: 3    cefadroxil (DURICEF) 500 mg capsule, Take 1 Cap by mouth two (2) times a day., Disp: 180 Cap, Rfl: 3    levothyroxine (SYNTHROID) 50 mcg tablet, Take 1 Tab by mouth Daily (before breakfast). , Disp: 90 Tab, Rfl: 3    omega-3 fatty acids-vitamin e (FISH OIL) 1,000 mg cap, Take 1 Cap by mouth daily. , Disp: , Rfl:     multivitamins-minerals-lutein (CENTRUM SILVER) tab, Take  by mouth daily. , Disp: , Rfl:     aspirin delayed-release 81 mg tablet, Take 81 mg by mouth daily. , Disp: , Rfl:    Date Last Reviewed:  5/25/2017     Number of Personal Factors/Comorbidities that affect the Plan of Care: 0: LOW COMPLEXITY   EXAMINATION: Observation/Orthostatic Postural Assessment:          Patient ambulates in R boot, appears in healthy condition, good gait speed; No significant edema or redness present at R foot/ankle   Palpation:          Mild tenderness at distal R Achilles, negative windlass and good ray mobility  ROM:          80% of normal in all directions at R foot/ankle  Strength:          4+/5 in all directions at R foot/ankle    Body Structures Involved:  1. Nerves  2. Bones  3. Joints  4. Muscles Body Functions Affected:  1. Sensory/Pain  2. Movement Related Activities and Participation Affected:  1. General Tasks and Demands  2. Mobility  3. Self Care  4. Domestic Life  5. Interpersonal Interactions and Relationships  6. Community, Social and [place]   Number of elements (examined above) that affect the Plan of Care: 1-2: LOW COMPLEXITY   CLINICAL PRESENTATION:   Presentation: Stable and uncomplicated: LOW COMPLEXITY   CLINICAL DECISION MAKING:   Outcome Measure: Tool Used: PT/OT FOOT AND ANKLE ABILITY MEASURE  Score:  Initial: 66 Most Recent: 66 (Date: 5-25-17 )   Interpretation of Score: For the \"Activities of Daily Living\", there are 21 questions each scored on a 5 point scale with 0 representing \"Unable to do\" and 4 representing \"No difficulty\". The lower the score, the greater the functional disability. 84/84 represents no disability. Minimal detectable change is 5.7 points. With the addition of the 8 questions in the \"Sports Subscale,\" there are 29 questions, each scored on a 5 point scale with 0 representing \"Unable to do\" and 4 representing \"No difficulty\". The lower the score, the greater the functional disability. 116/116 represents no disability. Minimal detectable change is 12.3 points.     Activities of Daily Living:  Score 84 83-68 67-51 50-34 33-18 17-1 0   Modifier CH CI CJ CK CL CM CN     Activities of Daily Living + Sports Subscale:  Score 116 115-94 93-71 70-47 46-24 23-1 0   Modifier CH CI CJ CK CL CM CN ? Mobility - Walking and Moving Around:     - CURRENT STATUS: CJ - 20%-39% impaired, limited or restricted    - GOAL STATUS: CI - 1%-19% impaired, limited or restricted    - D/C STATUS:  ---------------To be determined---------------    Medical Necessity:   · Patient is expected to demonstrate progress in strength and range of motion to improve safety during functional mobility. Reason for Services/Other Comments:  · Patient continues to require skilled intervention due to not reaching long term goals. Use of outcome tool(s) and clinical judgement create a POC that gives a: Clear prediction of patient's progress: LOW COMPLEXITY            TREATMENT:   (In addition to Assessment/Re-Assessment sessions the following treatments were rendered)  Pre-treatment Symptoms/Complaints:  Patient reports that she is doing pretty good, but still has occasional pain and mild swelling at the affected foot/ankle. Pain: Initial:     2/10 Post Session:  1/10     THERAPEUTIC EXERCISE: (15 minutes):  Exercises per grid below to improve mobility and strength. Required minimal verbal cues to promote proper body alignment, promote proper body posture and promote proper body mechanics. Progressed complexity of movement as indicated.    Date:  5-1-17 Date:  5-5-17 Date:  5-15-17 Date  5-25-17   Activity/Exercise Parameters Parameters Parameters    Education       Stepper   8 minutes  Level 1  Cool down Instructed to start doing this at the gym 8 minutes  Level 1 8 minutes  Level 1   Ankle t-band    Black band   x20 each -  -   Ankle isometrics    PF, foot in neutral position, 5 sec hold (50% force), 10x PF, foot in neutral position, 5 sec hold (50% force), 10x · PF: foot in relative neural: full force: 10 sec, 10x  · Inversion: 10 sec, 10x   Step-ups       4 inch step  x20              Manual therapy (30 minutes): Patient received AP and PA talocrural joint mobilizations, grades II-III, x5 each direction for 30 seconds; calcaneal distraction with achilles stretch at several points along the achilles (pain free range) Patient received manual STM to calf tendon in supine     · Modalities: Patient received vasopneumatic compression via GameReady to R foot/ankle, medium compression, 15 minutes, patient long sitting, to reduce swelling and pain at the affected LE  ·   Treatment/Session Assessment:    · Response to Treatment:  Patient tolerated the session well. She is progressing well, with minimal signs of increased symptoms and is self-managing her symptoms well. · Compliance with Program/Exercises: Will assess as treatment progresses. · Recommendations/Intent for next treatment session: \"Next visit will focus on advancements to more challenging activities\".   Total Treatment Duration: 60 min  PT Patient Time In/Time Out  Time In: 1030  Time Out: 42 Wern Joan Beavers, PT, DPT

## 2017-05-30 ENCOUNTER — HOSPITAL ENCOUNTER (OUTPATIENT)
Dept: PHYSICAL THERAPY | Age: 72
Discharge: HOME | End: 2017-05-30
Payer: MEDICARE

## 2017-05-30 PROCEDURE — 97016 VASOPNEUMATIC DEVICE THERAPY: CPT

## 2017-05-30 PROCEDURE — 97110 THERAPEUTIC EXERCISES: CPT

## 2017-05-30 NOTE — PROGRESS NOTES
[place]  : 1945 Therapy Center at [facility]  [address], [address], [place] [address]  [phone]   [phone]          OUTPATIENT PHYSICAL THERAPY:Daily Note and Progress Report 2017    ICD-10: Treatment Diagnosis: Pain in right foot (M79.671); Difficulty in walking, not elsewhere classified (R26.2)  Precautions/Allergies:   Pcn [penicillins]   Fall Risk Score: 0 (? 5 = High Risk)  MD Orders: evaluate and treat MEDICAL/REFERRING DIAGNOSIS:  pain in right foot   DATE OF ONSET: Chronic   REFERRING PHYSICIAN: Glo Ugalde MD  RETURN PHYSICIAN APPOINTMENT: 2 weeks     INITIAL ASSESSMENT:  Ms. Sonali Blevins initiated PT on 17 and has attended 9 out of 9 therapy sessions. She is progressing well in therapy, reporting decreased pain and improved gait. However, she does continue to report swelling, inflammation, and pain which prevents full functional mobility. She will benefit from continued skilled PT in order to reach full potential for gait and safety during ambulation/ADLs. PROBLEM LIST (Impacting functional limitations):  1. Decreased Strength  2. Decreased ADL/Functional Activities  3. Decreased Ambulation Ability/Technique  4. Decreased Balance  5. Increased Pain  6. Decreased Activity Tolerance  7. Decreased Flexibility/Joint Mobility  8. Decreased [place] with Home Exercise Program INTERVENTIONS PLANNED:  1. Balance Exercise  2. Cold  3. Gait Training  4. Heat  5. Home Exercise Program (HEP)  6. Manual Therapy  7. Neuromuscular Re-education/Strengthening  8. Range of Motion (ROM)  9. Therapeutic Activites  10. Therapeutic Exercise/Strengthening   TREATMENT PLAN:  Effective Dates: 17 TO 17. Frequency/Duration: 2 times a week for 4 weeks  GOALS: (Goals have been discussed and agreed upon with patient.)    SHORT-TERM FUNCTIONAL GOALS: Time Frame: 3 weeks  1.   Patient will be compliant with HEP focused on lower extremity strengthening and ROM. GOAL MET 5/30/2017  2. Patient will rate R LE pain no greater than 3-4/10 for improved tolerance to daily and work duties. GOAL MET 5/30/2017  DISCHARGE GOALS: Time Frame: 6 weeks  1. Patient will be independent with comprehensive HEP focused on continued performance of lower extremity strengthening and ROM activities. 2.  Patient will rate R LE pain no greater than 1-2/10 and which does not significantly interfere with daily or work duties for return to previous level of function. 3.  Patient will demonstrate near symmetrical bilateral LE AROM for optimum functional mobility with daily and work duties. 4.  Patient will demonstrate near symmetrical bilateral LE strength grades in the above tested planes for optimum performance and safety with daily and work duties. Rehabilitation Potential For Stated Goals: [facility] [address] therapy, I certify that the treatment plan above will be carried out by a therapist or under their direction. Thank you for this referral,  Natalya Underwood, PT, DPT     Referring Physician Signature: Margarito Allen MD              Date                    The information in this section was collected on 4-26-17 (except where otherwise noted). HISTORY:   History of Present Injury/Illness (Reason for Referral):  Patient reports very chronic history of R foot/ankle pain, which was worsening until referral to specialist. She saw Dr. Cam Bullock and was placed in a boot after CT imaging revealed Achilles calcific tendinitis and a heel spur. She states this boot has significantly decreased her symptoms. Past Medical History/Comorbidities:   Ms. Milvia Hill  has a past medical history of Abnormal Pap smear of cervix; Arrhythmia; Bacteremia; Cardiac pacemaker (7/15/2016); Chronic atrial fibrillation ([address] (2/17/2016); Chronic renal failure; Chronic systolic heart failure ([address] Dyslipidemia; Endocarditis; Heart failure ([address]  History of aortic valve replacement with bioprosthetic valve (7/15/2016); History of prosthetic heart valve (2010); bacterial endocarditis (2012); OTHER MEDICAL; Hypertension; Mechanical heart valve present (2001); Pacemaker (2010); Permanent atrial fibrillation ([address] and Tricuspid valve disorder,tr,ti,ts non rhe. Ms. Aquiles Torres  has a past surgical history that includes cardiac surg procedure unlist; pacemaker; tonsillectomy; aortic valve replacement (2009); mitral valve replacement (2001); and other surgical.  Social History/Living Environment:     Independent   Prior Level of Function/Work/Activity:  Independent and active   Personal Factors:          Sex:  female        Age:  80 y.o. Current Medications:       Current Outpatient Prescriptions:     diclofenac (VOLTAREN) 1 % gel, Apply  to affected area three (3) times daily as needed. , Disp: 1 Each, Rfl: 0    warfarin (JANTOVEN) 5 mg tablet, 1/2-1 Tablet As Directed, Disp: 90 Tab, Rfl: 3    carvedilol (COREG) 12.5 mg tablet, Take 1 Tab by mouth two (2) times a day., Disp: 180 Tab, Rfl: 3    furosemide (LASIX) 20 mg tablet, 1 qd, Disp: 90 Tab, Rfl: 3    potassium chloride SR (KLOR-CON 10) 10 mEq tablet, Take 1 Tab by mouth daily. , Disp: 90 Tab, Rfl: 3    losartan (COZAAR) 25 mg tablet, Take 1 Tab by mouth daily. , Disp: 90 Tab, Rfl: 3    cefadroxil (DURICEF) 500 mg capsule, Take 1 Cap by mouth two (2) times a day., Disp: 180 Cap, Rfl: 3    levothyroxine (SYNTHROID) 50 mcg tablet, Take 1 Tab by mouth Daily (before breakfast). , Disp: 90 Tab, Rfl: 3    omega-3 fatty acids-vitamin e (FISH OIL) 1,000 mg cap, Take 1 Cap by mouth daily. , Disp: , Rfl:     multivitamins-minerals-lutein (CENTRUM SILVER) tab, Take  by mouth daily. , Disp: , Rfl:     aspirin delayed-release 81 mg tablet, Take 81 mg by mouth daily. , Disp: , Rfl:    Date Last Reviewed:  5/30/2017     Number of Personal Factors/Comorbidities that affect the Plan of Care: 0: LOW COMPLEXITY   EXAMINATION: Observation/Orthostatic Postural Assessment:          Patient ambulates in R boot, appears in healthy condition, good gait speed; No significant edema or redness present at R foot/ankle   Palpation:          Mild tenderness at distal R Achilles, negative windlass and good ray mobility  ROM:          80% of normal in all directions at R foot/ankle  Strength:          4+/5 in all directions at R foot/ankle    Body Structures Involved:  1. Nerves  2. Bones  3. Joints  4. Muscles Body Functions Affected:  1. Sensory/Pain  2. Movement Related Activities and Participation Affected:  1. General Tasks and Demands  2. Mobility  3. Self Care  4. Domestic Life  5. Interpersonal Interactions and Relationships  6. Community, Social and [place]   Number of elements (examined above) that affect the Plan of Care: 1-2: LOW COMPLEXITY   CLINICAL PRESENTATION:   Presentation: Stable and uncomplicated: LOW COMPLEXITY   CLINICAL DECISION MAKING:   Outcome Measure: Tool Used: PT/OT FOOT AND ANKLE ABILITY MEASURE  Score:  Initial: 66 Most Recent: 66 (Date: 5-25-17 )   Interpretation of Score: For the \"Activities of Daily Living\", there are 21 questions each scored on a 5 point scale with 0 representing \"Unable to do\" and 4 representing \"No difficulty\". The lower the score, the greater the functional disability. 84/84 represents no disability. Minimal detectable change is 5.7 points. With the addition of the 8 questions in the \"Sports Subscale,\" there are 29 questions, each scored on a 5 point scale with 0 representing \"Unable to do\" and 4 representing \"No difficulty\". The lower the score, the greater the functional disability. 116/116 represents no disability. Minimal detectable change is 12.3 points.     Activities of Daily Living:  Score 84 83-68 67-51 50-34 33-18 17-1 0   Modifier CH CI CJ CK CL CM CN     Activities of Daily Living + Sports Subscale:  Score 116 115-94 93-71 70-47 46-24 23-1 0   Modifier CH CI CJ CK CL CM CN ? Mobility - Walking and Moving Around:     - CURRENT STATUS: CJ - 20%-39% impaired, limited or restricted    - GOAL STATUS: CI - 1%-19% impaired, limited or restricted    - D/C STATUS:  ---------------To be determined---------------    Medical Necessity:   · Patient is expected to demonstrate progress in strength and range of motion to improve safety during functional mobility. Reason for Services/Other Comments:  · Patient continues to require skilled intervention due to not reaching long term goals. Use of outcome tool(s) and clinical judgement create a POC that gives a: Clear prediction of patient's progress: LOW COMPLEXITY            TREATMENT:   (In addition to Assessment/Re-Assessment sessions the following treatments were rendered)  Pre-treatment Symptoms/Complaints:  Patient reports \"not a lot of progress, but no regression either. \" Still occasional soreness at medial and lateral borders of R foot. Pain: Initial:     2/10 Post Session:  1/10     THERAPEUTIC EXERCISE: (30 minutes):  Exercises per grid below to improve mobility and strength. Required minimal verbal cues to promote proper body alignment, promote proper body posture and promote proper body mechanics. Progressed complexity of movement as indicated.    Date:  5-1-17 Date:  5-5-17 Date:  5-15-17 Date  5-25-17 Date  5-30-17   Activity/Exercise Parameters Parameters Parameters     Education        Stepper   8 minutes  Level 1  Cool down Instructed to start doing this at the gym 8 minutes  Level 1 8 minutes  Level 1 8 minutes  Level 1   Ankle t-band    Black band   x20 each -  - Blue band  x15 each direction    Ankle isometrics    PF, foot in neutral position, 5 sec hold (50% force), 10x PF, foot in neutral position, 5 sec hold (50% force), 10x · PF: foot in relative neural: full force: 10 sec, 10x  · Inversion: 10 sec, 10x · PF: foot in relative neural: full force: 10 sec, 10x  · Inversion: 10 sec, 10x   Step-ups       4 inch step  x20 4 inch step  x20               Manual therapy (0 minutes): Patient received AP and PA talocrural joint mobilizations, grades II-III, x5 each direction for 30 seconds; calcaneal distraction with achilles stretch at several points along the achilles (pain free range) Patient received manual STM to calf tendon in supine     · Modalities: Patient received vasopneumatic compression via GameReady to R foot/ankle, medium compression, 15 minutes, patient long sitting, to reduce swelling and pain at the affected LE  ·   Treatment/Session Assessment:    · Response to Treatment:  Patient tolerated the session well. She is progressing well, with minimal signs of increased symptoms and is self-managing her symptoms well. · Compliance with Program/Exercises: Will assess as treatment progresses. · Recommendations/Intent for next treatment session: \"Next visit will focus on advancements to more challenging activities\".   Total Treatment Duration: 60 min  PT Patient Time In/Time Out  Time In: 1515  Time Out: 1600    Danish Cisneros PT, DPT

## 2017-06-01 ENCOUNTER — HOSPITAL ENCOUNTER (OUTPATIENT)
Dept: PHYSICAL THERAPY | Age: 72
Discharge: HOME | End: 2017-06-01
Payer: MEDICARE

## 2017-06-01 ENCOUNTER — HOSPITAL ENCOUNTER (OUTPATIENT)
Dept: PHYSICAL THERAPY | Age: 72
End: 2017-06-01
Payer: MEDICARE

## 2017-06-01 PROCEDURE — 97140 MANUAL THERAPY 1/> REGIONS: CPT

## 2017-06-01 PROCEDURE — 97016 VASOPNEUMATIC DEVICE THERAPY: CPT

## 2017-06-01 PROCEDURE — 97110 THERAPEUTIC EXERCISES: CPT

## 2017-06-01 NOTE — PROGRESS NOTES
[place]  : 1945 Therapy Center at [facility]  [address], [address], [place]  [phone]   [phone]          OUTPATIENT PHYSICAL THERAPY:Daily Note 2017    ICD-10: Treatment Diagnosis: Pain in right foot (M79.671); Difficulty in walking, not elsewhere classified (R26.2)  Precautions/Allergies:   Pcn [penicillins]   Fall Risk Score: 0 (? 5 = High Risk)  MD Orders: evaluate and treat MEDICAL/REFERRING DIAGNOSIS:  pain in right foot   DATE OF ONSET: Chronic   REFERRING PHYSICIAN: Stephon Velazquez MD  RETURN PHYSICIAN APPOINTMENT: 2 weeks     INITIAL ASSESSMENT:  Ms. August Campa initiated PT on 17 and has attended 9 out of 9 therapy sessions. She is progressing well in therapy, reporting decreased pain and improved gait. However, she does continue to report swelling, inflammation, and pain which prevents full functional mobility. She will benefit from continued skilled PT in order to reach full potential for gait and safety during ambulation/ADLs. PROBLEM LIST (Impacting functional limitations):  1. Decreased Strength  2. Decreased ADL/Functional Activities  3. Decreased Ambulation Ability/Technique  4. Decreased Balance  5. Increased Pain  6. Decreased Activity Tolerance  7. Decreased Flexibility/Joint Mobility  8. Decreased [place] with Home Exercise Program INTERVENTIONS PLANNED:  1. Balance Exercise  2. Cold  3. Gait Training  4. Heat  5. Home Exercise Program (HEP)  6. Manual Therapy  7. Neuromuscular Re-education/Strengthening  8. Range of Motion (ROM)  9. Therapeutic Activites  10. Therapeutic Exercise/Strengthening   TREATMENT PLAN:  Effective Dates: 17 TO 17. Frequency/Duration: 2 times a week for 4 weeks  GOALS: (Goals have been discussed and agreed upon with patient.)    SHORT-TERM FUNCTIONAL GOALS: Time Frame: 3 weeks  1. Patient will be compliant with HEP focused on lower extremity strengthening and ROM.  GOAL MET 6/1/2017  2. Patient will rate R LE pain no greater than 3-4/10 for improved tolerance to daily and work duties. GOAL MET 6/1/2017  DISCHARGE GOALS: Time Frame: 6 weeks  1. Patient will be independent with comprehensive HEP focused on continued performance of lower extremity strengthening and ROM activities. 2.  Patient will rate R LE pain no greater than 1-2/10 and which does not significantly interfere with daily or work duties for return to previous level of function. 3.  Patient will demonstrate near symmetrical bilateral LE AROM for optimum functional mobility with daily and work duties. 4.  Patient will demonstrate near symmetrical bilateral LE strength grades in the above tested planes for optimum performance and safety with daily and work duties. Rehabilitation Potential For Stated Goals: [facility] therapy, I certify that the treatment plan above will be carried out by a therapist or under their direction. Thank you for this referral,  Radha Bobo, PT, DPT     Referring Physician Signature: Stephon Velazquez MD              Date                    The information in this section was collected on 4-26-17 (except where otherwise noted). HISTORY:   History of Present Injury/Illness (Reason for Referral):  Patient reports very chronic history of R foot/ankle pain, which was worsening until referral to specialist. She saw Dr. Mary Almodovar and was placed in a boot after CT imaging revealed Achilles calcific tendinitis and a heel spur. She states this boot has significantly decreased her symptoms. Past Medical History/Comorbidities:   Ms. August Campa  has a past medical history of Abnormal Pap smear of cervix; Arrhythmia; Bacteremia; Cardiac pacemaker (7/15/2016); Chronic atrial fibrillation ([address] (2/17/2016); Chronic renal failure; Chronic systolic heart failure ([address] Dyslipidemia; Endocarditis; Heart failure ([address]  History of aortic valve replacement with bioprosthetic valve (7/15/2016); History of prosthetic heart valve (2010); bacterial endocarditis (2012); OTHER MEDICAL; Hypertension; Mechanical heart valve present (2001); Pacemaker (2010); Permanent atrial fibrillation ([facility] and Tricuspid valve disorder,tr,ti,ts non rhe. Ms. Robbie Moreno  has a past surgical history that includes cardiac surg procedure unlist; pacemaker; tonsillectomy; aortic valve replacement (2009); mitral valve replacement (2001); and other surgical.  Social History/Living Environment:     Independent   Prior Level of Function/Work/Activity:  Independent and active   Personal Factors:          Sex:  female        Age:  80 y.o. Current Medications:       Current Outpatient Prescriptions:     diclofenac (VOLTAREN) 1 % gel, Apply  to affected area three (3) times daily as needed. , Disp: 1 Each, Rfl: 0    warfarin (JANTOVEN) 5 mg tablet, 1/2-1 Tablet As Directed, Disp: 90 Tab, Rfl: 3    carvedilol (COREG) 12.5 mg tablet, Take 1 Tab by mouth two (2) times a day., Disp: 180 Tab, Rfl: 3    furosemide (LASIX) 20 mg tablet, 1 qd, Disp: 90 Tab, Rfl: 3    potassium chloride SR (KLOR-CON 10) 10 mEq tablet, Take 1 Tab by mouth daily. , Disp: 90 Tab, Rfl: 3    losartan (COZAAR) 25 mg tablet, Take 1 Tab by mouth daily. , Disp: 90 Tab, Rfl: 3    cefadroxil (DURICEF) 500 mg capsule, Take 1 Cap by mouth two (2) times a day., Disp: 180 Cap, Rfl: 3    levothyroxine (SYNTHROID) 50 mcg tablet, Take 1 Tab by mouth Daily (before breakfast). , Disp: 90 Tab, Rfl: 3    omega-3 fatty acids-vitamin e (FISH OIL) 1,000 mg cap, Take 1 Cap by mouth daily. , Disp: , Rfl:     multivitamins-minerals-lutein (CENTRUM SILVER) tab, Take  by mouth daily. , Disp: , Rfl:     aspirin delayed-release 81 mg tablet, Take 81 mg by mouth daily. , Disp: , Rfl:    Date Last Reviewed:  6/1/2017     Number of Personal Factors/Comorbidities that affect the Plan of Care: 0: LOW COMPLEXITY   EXAMINATION:   Observation/Orthostatic Postural Assessment:          Patient ambulates in R boot, appears in healthy condition, good gait speed; No significant edema or redness present at R foot/ankle   Palpation:          Mild tenderness at distal R Achilles, negative windlass and good ray mobility  ROM:          80% of normal in all directions at R foot/ankle  Strength:          4+/5 in all directions at R foot/ankle    Body Structures Involved:  1. Nerves  2. Bones  3. Joints  4. Muscles Body Functions Affected:  1. Sensory/Pain  2. Movement Related Activities and Participation Affected:  1. General Tasks and Demands  2. Mobility  3. Self Care  4. Domestic Life  5. Interpersonal Interactions and Relationships  6. Community, Social and [employer] [place]   Number of elements (examined above) that affect the Plan of Care: 1-2: LOW COMPLEXITY   CLINICAL PRESENTATION:   Presentation: Stable and uncomplicated: LOW COMPLEXITY   CLINICAL DECISION MAKING:   Outcome Measure: Tool Used: PT/OT FOOT AND ANKLE ABILITY MEASURE  Score:  Initial: 66 Most Recent: 66 (Date: 5-25-17 )   Interpretation of Score: For the \"Activities of Daily Living\", there are 21 questions each scored on a 5 point scale with 0 representing \"Unable to do\" and 4 representing \"No difficulty\". The lower the score, the greater the functional disability. 84/84 represents no disability. Minimal detectable change is 5.7 points. With the addition of the 8 questions in the \"Sports Subscale,\" there are 29 questions, each scored on a 5 point scale with 0 representing \"Unable to do\" and 4 representing \"No difficulty\". The lower the score, the greater the functional disability. 116/116 represents no disability. Minimal detectable change is 12.3 points. Activities of Daily Living:  Score 84 83-68 67-51 50-34 33-18 17-1 0   Modifier CH CI CJ CK CL CM CN     Activities of Daily Living + Sports Subscale:  Score 116 115-94 93-71 70-47 46-24 23-1 0   Modifier CH CI CJ CK CL CM CN     ?  Mobility - Walking and Moving Around:     - CURRENT STATUS: CJ - 20%-39% impaired, limited or restricted    - GOAL STATUS: CI - 1%-19% impaired, limited or restricted    - D/C STATUS:  ---------------To be determined---------------    Medical Necessity:   · Patient is expected to demonstrate progress in strength and range of motion to improve safety during functional mobility. Reason for Services/Other Comments:  · Patient continues to require skilled intervention due to not reaching long term goals. Use of outcome tool(s) and clinical judgement create a POC that gives a: Clear prediction of patient's progress: LOW COMPLEXITY            TREATMENT:   (In addition to Assessment/Re-Assessment sessions the following treatments were rendered)  Pre-treatment Symptoms/Complaints:  Patient reports she is doing well, and states no issues, other than occasional swelling after prolonged activity. Pain: Initial:     2/10 Post Session:  1/10     THERAPEUTIC EXERCISE: (15 minutes):  Exercises per grid below to improve mobility and strength. Required minimal verbal cues to promote proper body alignment, promote proper body posture and promote proper body mechanics. Progressed complexity of movement as indicated.    Date:  5-1-17 Date:  5-5-17 Date:  5-15-17 Date  5-25-17 Date  6-1-17   Activity/Exercise Parameters Parameters Parameters     Education        Stepper   8 minutes  Level 1  Cool down Instructed to start doing this at the gym 8 minutes  Level 1 8 minutes  Level 1 8 minutes  Level 1   Ankle t-band    Black band   x20 each -  - Blue band  x15 each direction    Ankle isometrics    PF, foot in neutral position, 5 sec hold (50% force), 10x PF, foot in neutral position, 5 sec hold (50% force), 10x · PF: foot in relative neural: full force: 10 sec, 10x  · Inversion: 10 sec, 10x    Step-ups       4 inch step  x20 4 inch step  x20   Wall squats       Mini   2x10  Ball against wall     Manual therapy (10 minutes): Patient received AP and PA talocrural joint mobilizations, grades II-III, x5 each direction for 30 seconds; calcaneal distraction with achilles stretch at several points along the achilles (pain free range) Patient received manual STM to calf tendon in supine     · Modalities: Patient received vasopneumatic compression via [employer] to R foot/ankle, medium compression, 15 minutes, patient long sitting, to reduce swelling and pain at the affected LE  ·   Treatment/Session Assessment:    · Response to Treatment:  Patient tolerated the session well. She is progressing well, with minimal signs of increased symptoms and is self-managing her symptoms well. · Compliance with Program/Exercises: Will assess as treatment progresses. · Recommendations/Intent for next treatment session: \"Next visit will focus on advancements to more challenging activities\".   Total Treatment Duration: 60 min  PT Patient Time In/Time Out  Time In: 1300  Time Out: 1345    Mar Braden, PT, DPT

## 2017-06-05 ENCOUNTER — APPOINTMENT (OUTPATIENT)
Dept: PHYSICAL THERAPY | Age: 72
End: 2017-06-05
Payer: MEDICARE

## 2017-06-07 ENCOUNTER — HOSPITAL ENCOUNTER (OUTPATIENT)
Dept: PHYSICAL THERAPY | Age: 72
Discharge: HOME | End: 2017-06-07
Payer: MEDICARE

## 2017-06-07 PROCEDURE — 97110 THERAPEUTIC EXERCISES: CPT

## 2017-06-07 PROCEDURE — G8979 MOBILITY GOAL STATUS: HCPCS

## 2017-06-07 PROCEDURE — G8980 MOBILITY D/C STATUS: HCPCS

## 2017-06-07 NOTE — PROGRESS NOTES
[place]  : 1945 Therapy Center at [facility]  [address], [address], [place] [address]  [phone]   [phone]          OUTPATIENT PHYSICAL THERAPY:Daily Note and Discharge 2017    ICD-10: Treatment Diagnosis: Pain in right foot (M79.671); Difficulty in walking, not elsewhere classified (R26.2)  Precautions/Allergies:   Pcn [penicillins]   Fall Risk Score: 0 (? 5 = High Risk)  MD Orders: evaluate and treat MEDICAL/REFERRING DIAGNOSIS:  pain in right foot   DATE OF ONSET: Chronic   REFERRING PHYSICIAN: Leah Guzman MD  RETURN PHYSICIAN APPOINTMENT: 2 weeks     INITIAL ASSESSMENT:  Ms. Marina Arechiga initiated PT on 17 and has attended 12 out of 12 therapy sessions. She progressed very well in PT and is noting no functional limitations, only mild swelling occasionally. She has met all of her PT goals, and is looking to be discharged at her next MD appointment on 17. She will be discharged from formal PT at this time. PROBLEM LIST (Impacting functional limitations):  1. Decreased Strength  2. Decreased ADL/Functional Activities  3. Decreased Ambulation Ability/Technique  4. Decreased Balance  5. Increased Pain  6. Decreased Activity Tolerance  7. Decreased Flexibility/Joint Mobility  8. Decreased [place] with Home Exercise Program INTERVENTIONS PLANNED:  1. Balance Exercise  2. Cold  3. Gait Training  4. Heat  5. Home Exercise Program (HEP)  6. Manual Therapy  7. Neuromuscular Re-education/Strengthening  8. Range of Motion (ROM)  9. Therapeutic Activites  10. Therapeutic Exercise/Strengthening   TREATMENT PLAN:  Effective Dates: 17 TO 17  GOALS: (Goals have been discussed and agreed upon with patient.)    ALL GOALS MET 2017    SHORT-TERM FUNCTIONAL GOALS: Time Frame: 3 weeks  1. Patient will be compliant with HEP focused on lower extremity strengthening and ROM. GOAL MET 2017  2.   Patient will rate R LE pain no greater than 3-4/10 for improved tolerance to daily and work duties. GOAL MET 6/7/2017   DISCHARGE GOALS: Time Frame: 6 weeks  1. Patient will be independent with comprehensive HEP focused on continued performance of lower extremity strengthening and ROM activities. 2.  Patient will rate R LE pain no greater than 1-2/10 and which does not significantly interfere with daily or work duties for return to previous level of function. 3.  Patient will demonstrate near symmetrical bilateral LE AROM for optimum functional mobility with daily and work duties. 4.  Patient will demonstrate near symmetrical bilateral LE strength grades in the above tested planes for optimum performance and safety with daily and work duties. Rehabilitation Potential For Stated Goals: [facility] therapy, I certify that the treatment plan above will be carried out by a therapist or under their direction. Thank you for this referral,  Mar Braden, PT, DPT                 The information in this section was collected on 4-26-17 (except where otherwise noted). HISTORY:   History of Present Injury/Illness (Reason for Referral):  Patient reports very chronic history of R foot/ankle pain, which was worsening until referral to specialist. She saw Dr. Tiffany Beltran and was placed in a boot after CT imaging revealed Achilles calcific tendinitis and a heel spur. She states this boot has significantly decreased her symptoms. Past Medical History/Comorbidities:   Ms. Rebeca Moraes  has a past medical history of Abnormal Pap smear of cervix; Arrhythmia; Bacteremia; Cardiac pacemaker (7/15/2016); Chronic atrial fibrillation ([address] (2/17/2016); Chronic renal failure; Chronic systolic heart failure ([address] Dyslipidemia; Endocarditis; Heart failure ([address] History of aortic valve replacement with bioprosthetic valve (7/15/2016); History of prosthetic heart valve (2010); bacterial endocarditis (2012); OTHER MEDICAL;  Hypertension; Mechanical heart valve present (2001); Pacemaker (2010); Permanent atrial fibrillation ([address] and Tricuspid valve disorder,tr,ti,ts non rhe. Ms. Les De Jesus  has a past surgical history that includes cardiac surg procedure unlist; pacemaker; tonsillectomy; aortic valve replacement (2009); mitral valve replacement (2001); and other surgical.  Social History/Living Environment:     Independent   Prior Level of Function/Work/Activity:  Independent and active   Personal Factors:          Sex:  female        Age:  80 y.o. Current Medications:       Current Outpatient Prescriptions:     diclofenac (VOLTAREN) 1 % gel, Apply  to affected area three (3) times daily as needed. , Disp: 1 Each, Rfl: 0    warfarin (JANTOVEN) 5 mg tablet, 1/2-1 Tablet As Directed, Disp: 90 Tab, Rfl: 3    carvedilol (COREG) 12.5 mg tablet, Take 1 Tab by mouth two (2) times a day., Disp: 180 Tab, Rfl: 3    furosemide (LASIX) 20 mg tablet, 1 qd, Disp: 90 Tab, Rfl: 3    potassium chloride SR (KLOR-CON 10) 10 mEq tablet, Take 1 Tab by mouth daily. , Disp: 90 Tab, Rfl: 3    losartan (COZAAR) 25 mg tablet, Take 1 Tab by mouth daily. , Disp: 90 Tab, Rfl: 3    cefadroxil (DURICEF) 500 mg capsule, Take 1 Cap by mouth two (2) times a day., Disp: 180 Cap, Rfl: 3    levothyroxine (SYNTHROID) 50 mcg tablet, Take 1 Tab by mouth Daily (before breakfast). , Disp: 90 Tab, Rfl: 3    omega-3 fatty acids-vitamin e (FISH OIL) 1,000 mg cap, Take 1 Cap by mouth daily. , Disp: , Rfl:     multivitamins-minerals-lutein (CENTRUM SILVER) tab, Take  by mouth daily. , Disp: , Rfl:     aspirin delayed-release 81 mg tablet, Take 81 mg by mouth daily. , Disp: , Rfl:    Date Last Reviewed:  6/7/2017     Number of Personal Factors/Comorbidities that affect the Plan of Care: 0: LOW COMPLEXITY   EXAMINATION:   Observation/Orthostatic Postural Assessment:          Patient ambulates without her boot today, normalized gait pattern and weight bearing.     Palpation:          No tenderness reported throughout R foot/ankle. ROM:          90% of normal in all directions at R foot/ankle  Strength:          4+/5 in all directions at R foot/ankle    Body Structures Involved:  1. Nerves  2. Bones  3. Joints  4. Muscles Body Functions Affected:  1. Sensory/Pain  2. Movement Related Activities and Participation Affected:  1. General Tasks and Demands  2. Mobility  3. Self Care  4. Domestic Life  5. Interpersonal Interactions and Relationships  6. Community, Social and [place]   Number of elements (examined above) that affect the Plan of Care: 1-2: LOW COMPLEXITY   CLINICAL PRESENTATION:   Presentation: Stable and uncomplicated: LOW COMPLEXITY   CLINICAL DECISION MAKING:   Outcome Measure: Tool Used: PT/OT FOOT AND ANKLE ABILITY MEASURE  Score:  Initial: 66 Most Recent: 66 (Date: 5-25-17 )  76 (Date: 6-7-17)   Interpretation of Score: For the \"Activities of Daily Living\", there are 21 questions each scored on a 5 point scale with 0 representing \"Unable to do\" and 4 representing \"No difficulty\". The lower the score, the greater the functional disability. 84/84 represents no disability. Minimal detectable change is 5.7 points. With the addition of the 8 questions in the \"Sports Subscale,\" there are 29 questions, each scored on a 5 point scale with 0 representing \"Unable to do\" and 4 representing \"No difficulty\". The lower the score, the greater the functional disability. 116/116 represents no disability. Minimal detectable change is 12.3 points. Activities of Daily Living:  Score 84 83-68 67-51 50-34 33-18 17-1 0   Modifier CH CI CJ CK CL CM CN     Activities of Daily Living + Sports Subscale:  Score 116 115-94 93-71 70-47 46-24 23-1 0   Modifier CH CI CJ CK CL CM CN     ?  Mobility - Walking and Moving Around:        - GOAL STATUS: CI - 1%-19% impaired, limited or restricted    - D/C STATUS:  CI - 1%-19% impaired, limited or restricted       Use of outcome tool(s) and clinical judgement create a POC that gives a: Clear prediction of patient's progress: LOW COMPLEXITY            TREATMENT:   (In addition to Assessment/Re-Assessment sessions the following treatments were rendered)  Pre-treatment Symptoms/Complaints:  Patient reports she is doing well, and states no issues, other than occasional swelling after prolonged activity. She ambulates today without the boot and normalized gait pattern. Pain: Initial:     2/10 Post Session:  1/10     THERAPEUTIC EXERCISE: (15 minutes):  Exercises per grid below to improve mobility and strength. Required minimal verbal cues to promote proper body alignment, promote proper body posture and promote proper body mechanics. Progressed complexity of movement as indicated. Date:  5-1-17 Date:  5-5-17 Date:  5-15-17 Date  5-25-17 Date  6-7-17   Activity/Exercise Parameters Parameters Parameters     Education        Stepper   8 minutes  Level 1  Cool down Instructed to start doing this at the gym 8 minutes  Level 1 8 minutes  Level 1 8 minutes  Level 1   Ankle t-band    Black band   x20 each -  - Blue band  x15 each direction    Ankle isometrics    PF, foot in neutral position, 5 sec hold (50% force), 10x PF, foot in neutral position, 5 sec hold (50% force), 10x · PF: foot in relative neural: full force: 10 sec, 10x  · Inversion: 10 sec, 10x    Step-ups       4 inch step  x20 4 inch step  x20   Wall squats       Mini   2x10  Ball against wall     Treatment/Session Assessment:    · Response to Treatment:  Patient has met all long term goals, agreeable with DC from therapy today, and expecting DC from Dr. Evie Ferreira during her visit this week with him.      Total Treatment Duration: 15 mins    PT Patient Time In/Time Out  Time In: 1115  Time Out: 42 Mayco Beavers, PT, DPT

## 2018-03-25 ENCOUNTER — HOSPITAL ENCOUNTER (EMERGENCY)
Age: 73
Discharge: HOME | End: 2018-03-25
Attending: EMERGENCY MEDICINE
Payer: MEDICARE

## 2018-03-25 ENCOUNTER — APPOINTMENT (OUTPATIENT)
Dept: CT IMAGING | Age: 73
End: 2018-03-25
Attending: EMERGENCY MEDICINE
Payer: MEDICARE

## 2018-03-25 VITALS
WEIGHT: 192 LBS | OXYGEN SATURATION: 94 % | HEART RATE: 68 BPM | SYSTOLIC BLOOD PRESSURE: 135 MMHG | TEMPERATURE: 97.8 F | BODY MASS INDEX: 30.13 KG/M2 | HEIGHT: 67 IN | DIASTOLIC BLOOD PRESSURE: 68 MMHG | RESPIRATION RATE: 16 BRPM

## 2018-03-25 DIAGNOSIS — S00.83XA CONTUSION OF FOREHEAD, INITIAL ENCOUNTER: ICD-10-CM

## 2018-03-25 DIAGNOSIS — S16.1XXA STRAIN OF NECK MUSCLE, INITIAL ENCOUNTER: Primary | ICD-10-CM

## 2018-03-25 PROCEDURE — 70450 CT HEAD/BRAIN W/O DYE: CPT

## 2018-03-25 PROCEDURE — 72125 CT NECK SPINE W/O DYE: CPT

## 2018-03-25 PROCEDURE — 99284 EMERGENCY DEPT VISIT MOD MDM: CPT | Performed by: EMERGENCY MEDICINE

## 2018-03-25 RX ORDER — METHOCARBAMOL 750 MG/1
750 TABLET, FILM COATED ORAL 4 TIMES DAILY
Qty: 30 TAB | Refills: 0 | Status: SHIPPED | OUTPATIENT
Start: 2018-03-25 | End: 2018-04-02
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
## 2018-03-25 NOTE — DISCHARGE INSTRUCTIONS
Neck Strain: Care Instructions  Your Care Instructions    You have strained the muscles and ligaments in your neck. A sudden, awkward movement can strain the neck. This often occurs with falls or car accidents or during certain sports. Everyday activities like working on a computer or sleeping can also cause neck strain if they force you to hold your neck in an awkward position for a long time. It is common for neck pain to get worse for a day or two after an injury, but it should start to feel better after that. You may have more pain and stiffness for several days before it gets better. This is expected. It may take a few weeks or longer for it to heal completely. Good home treatment can help you get better faster and avoid future neck problems. Follow-up care is a key part of your treatment and safety. Be sure to make and go to all appointments, and call your doctor if you are having problems. It's also a good idea to know your test results and keep a list of the medicines you take. How can you care for yourself at home? · If you were given a neck brace (cervical collar) to limit neck motion, wear it as instructed for as many days as your doctor tells you to. Do not wear it longer than you were told to. Wearing a brace for too long can make neck stiffness worse and weaken the neck muscles. · You can try using heat or ice to see if it helps. ¨ Try using a heating pad on a low or medium setting for 15 to 20 minutes every 2 to 3 hours. Try a warm shower in place of one session with the heating pad. You can also buy single-use heat wraps that last up to 8 hours. ¨ You can also try an ice pack for 10 to 15 minutes every 2 to 3 hours. · Take pain medicines exactly as directed. ¨ If the doctor gave you a prescription medicine for pain, take it as prescribed. ¨ If you are not taking a prescription pain medicine, ask your doctor if you can take an over-the-counter medicine.   · Gently rub the area to relieve pain and help with blood flow. Do not massage the area if it hurts to do so. · Do not do anything that makes the pain worse. Take it easy for a couple of days. You can do your usual activities if they do not hurt your neck or put it at risk for more stress or injury. · Try sleeping on a special neck pillow. Place it under your neck, not under your head. Placing a tightly rolled-up towel under your neck while you sleep will also work. If you use a neck pillow or rolled towel, do not use your regular pillow at the same time. · To prevent future neck pain, do exercises to stretch and strengthen your neck and back. Learn how to use good posture, safe lifting techniques, and proper body mechanics. When should you call for help? Call 911 anytime you think you may need emergency care. For example, call if:  ? · You are unable to move an arm or a leg at all. ?Call your doctor now or seek immediate medical care if:  ? · You have new or worse symptoms in your arms, legs, chest, belly, or buttocks. Symptoms may include:  ¨ Numbness or tingling. ¨ Weakness. ¨ Pain. ? · You lose bladder or bowel control. ? Watch closely for changes in your health, and be sure to contact your doctor if:  ? · You are not getting better as expected. Where can you learn more? Go to http://feliciano-raymond.info/. Enter M253 in the search box to learn more about \"Neck Strain: Care Instructions. \"  Current as of: March 21, 2017  Content Version: 11.4  © 9109-6954 [employer]. Care instructions adapted under license by [employer] (which disclaims liability or warranty for this information). If you have questions about a medical condition or this instruction, always ask your healthcare professional. [address] any warranty or liability for your use of this information.

## 2018-03-25 NOTE — ED NOTES
I have reviewed discharge instructions with the patient. The patient verbalized understanding. Patient left ED via Discharge Method: ambulatory to Home with daughter and grandsons x2. Opportunity for questions and clarification provided. Patient given 1 scripts. Robaxin        To continue your aftercare when you leave the hospital, you may receive an automated call from our care team to check in on how you are doing. This is a free service and part of our promise to provide the best care and service to meet your aftercare needs.  If you have questions, or wish to unsubscribe from this service please call [phone]. Thank you for Choosing our [facility] Emergency Department.
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
## 2018-07-18 PROBLEM — Z79.01 LONG TERM (CURRENT) USE OF ANTICOAGULANTS: Status: ACTIVE | Noted: 2018-07-18

## 2018-08-03 ENCOUNTER — HOSPITAL ENCOUNTER (OUTPATIENT)
Dept: MAMMOGRAPHY | Age: 73
Discharge: HOME | End: 2018-08-03
Attending: INTERNAL MEDICINE
Payer: MEDICARE

## 2018-08-03 DIAGNOSIS — Z12.31 VISIT FOR SCREENING MAMMOGRAM: ICD-10-CM

## 2018-08-03 PROCEDURE — 77067 SCR MAMMO BI INCL CAD: CPT

## 2019-10-03 PROBLEM — I48.20 CHRONIC ATRIAL FIBRILLATION (HCC): Status: ACTIVE | Noted: 2019-10-03

## 2020-04-23 PROBLEM — I48.21 PERMANENT ATRIAL FIBRILLATION (HCC): Status: ACTIVE | Noted: 2019-10-03

## 2021-01-21 ENCOUNTER — TRANSCRIBE ORDER (OUTPATIENT)
Dept: SCHEDULING | Age: 76
End: 2021-01-21

## 2021-01-21 DIAGNOSIS — Z12.31 ENCOUNTER FOR SCREENING MAMMOGRAM FOR MALIGNANT NEOPLASM OF BREAST: Primary | ICD-10-CM

## 2021-02-16 ENCOUNTER — HOSPITAL ENCOUNTER (OUTPATIENT)
Dept: PHYSICAL THERAPY | Age: 76
Discharge: HOME | End: 2021-02-16
Payer: MEDICARE

## 2021-02-16 DIAGNOSIS — M54.41 ACUTE RIGHT-SIDED LOW BACK PAIN WITH RIGHT-SIDED SCIATICA: ICD-10-CM

## 2021-02-16 PROCEDURE — 97162 PT EVAL MOD COMPLEX 30 MIN: CPT

## 2021-02-16 PROCEDURE — 97110 THERAPEUTIC EXERCISES: CPT
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
## 2021-02-17 NOTE — PROGRESS NOTES
Emily Coello  : 1945  [place] / Product Type: Medicare /  [address] [place] [address] at [address] [place] [address]. [facility], [address] [place] [address]  [phone]   [phone]                                                          Blake Galo NP      OUTPATIENT PHYSICAL THERAPY: Daily Treatment Note 2021 Visit Count:  1    Tx Diagnosis:  Lumbago with Sciatica Right side (M54.41)  Muscle Weakness, Generalized (M62.81)      Pre-treatment Symptoms/Complaints: See Initial Eval Dated 21 for more details. Pain: Initial:0/10  Medications Last Reviewed:  2021     Post Session: 0/10   Updated Objective Findings: See Initial Eval for more details. TREATMENT:   THERAPEUTIC EXERCISE: (10 minutes):  Exercises per grid below to improve mobility, strength and balance. Required minimal visual, verbal and manual cues to promote proper body alignment and promote proper body posture. Progressed resistance and complexity of movement as indicated. Date:  2021 Date:   Date:     Activity/Exercise Parameters Parameters Parameters   Education HEP, POC, PT goals, anatomy/pathology, health benefits of exercise     STS chair 1 x 12     Toe touches 1 x 10     Back bends 1 x 10                             THERAPEUTIC ACTIVITY: ( 0 minutes): Activities per gid below to improve functional movement related mobility, strength and balance to improve neuro-muscular carryover to daily functional activities for improving patient's quality of life. Required visual, verbal and manual cues to promote proper body alignment and promote proper body posture/mechanics. Progressed resistance and complexity of movement as indicated.      Date:  2021 Date:   Date:     Activity/Exercise Parameters Parameters Parameters                                                                               MANUAL THERAPY: (0 minutes): Joint mobilization, Soft tissue mobilization was utilized and necessary because of the patient's restricted joint motion and restricted motion of soft tissue mobility. Date  2/16/2021    Technique Used Grade  Level # Time(s) Effect while being performed                                                                 HEP Log Date 1.    2/16/2021   2.  2/16/2021   3. 2/16/2021   4.    5.           [employer] Portal  Treatment/Session Summary:    Response to Treatment: Pt demonstrated understanding of POC and initial HEP. No increase in pain or adverse reactions. Communication/Consultation:  POC, HEP, PT goals, Faxed initial evaluation to MD.   Equipment provided today: HEP Handout   Recommendations/Intent for next treatment session:   Next visit will focus on functional strengthening, flexibility, health and wellness instruction. Treatment Plan of Care Effective Dates: 2/16/2021 TO 4/18/2021 (60 days).   Frequency/Duration: 2 times a week for 60 Days             Total Treatment Billable Duration:   10  Rx plus Eval   PT Patient Time In/Time Out  Time In: 1400  Time Out: [address], RT    Future Appointments   Date Time Provider Jeremiah Krishnamurthy   2/22/2021  3:15 PM Ajit Chew, RT [facility]   2/23/2021  9:30 AM SFE MOBILE MAMMO [facility]FE   2/24/2021  1:00 PM Ajit Chew, RT [facility]   3/1/2021  2:30 PM Suzanne Chew, RT [facility]   3/2/2021 11:00 AM DEVICE 39 [facility] UCD   3/3/2021 10:30 AM Suzanne Chew, RT [facility]   3/9/2021 11:00 AM Ajit Chew, RT [facility]   3/11/2021 11:45 AM PT GVL [facility]SA [facility] UCD   3/12/2021  1:00 PM Otto Chew, RT [facility]   3/16/2021 11:00 AM Ajit Chew, RT [facility]   3/18/2021 11:00 Suzanne Tee, RT [facility]   4/14/2021 10:30 AM ECHO 52 [facility]   4/27/2021 10:00 AM Boris Wolf MD [facility]

## 2021-02-22 ENCOUNTER — HOSPITAL ENCOUNTER (OUTPATIENT)
Dept: PHYSICAL THERAPY | Age: 76
Discharge: HOME | End: 2021-02-22
Payer: MEDICARE

## 2021-02-22 PROCEDURE — 97110 THERAPEUTIC EXERCISES: CPT

## 2021-02-22 PROCEDURE — 97530 THERAPEUTIC ACTIVITIES: CPT

## 2021-02-22 NOTE — PROGRESS NOTES
Alesha Coello  : 1945  [place] / Product Type: Medicare /  [address] [place] [address] at [address] [place] [address]. [facility], [address] [place] [address]  [phone]   [phone]                                                          Danielle Carlisle NP      OUTPATIENT PHYSICAL THERAPY: Daily Treatment Note 2021 Visit Count:  2    Tx Diagnosis:  Lumbago with Sciatica Right side (M54.41)  Muscle Weakness, Generalized (M62.81)      Pre-treatment Symptoms/Complaints: See Initial Eval Dated 21 for more details. Pain: Initial:0/10  Medications Last Reviewed:  2021     Post Session: 0/10   Updated Objective Findings: See Initial Eval for more details. TREATMENT:   THERAPEUTIC EXERCISE: (25 minutes):  Exercises per grid below to improve mobility, strength and balance. Required minimal visual, verbal and manual cues to promote proper body alignment and promote proper body posture. Progressed resistance and complexity of movement as indicated. Date:  2021 Date:  21 Date:     Activity/Exercise Parameters Parameters Parameters   Education HEP, POC, PT goals, anatomy/pathology, health benefits of exercise     Treadmill   2.3 mph 9.0 min    STS chair 1 x 12     Toe touches 1 x 10 1 x 10    Back bends 1 x 10     Sciatic nerve glides  20 x bilateral     Cat and camels  10 x    Lower trunk rotation  20 x    Overhead reach with pole  10 x    Side bend stretch  5 x ea                            THERAPEUTIC ACTIVITY: ( 15 minutes): Activities per gid below to improve functional movement related mobility, strength and balance to improve neuro-muscular carryover to daily functional activities for improving patient's quality of life. Required visual, verbal and manual cues to promote proper body alignment and promote proper body posture/mechanics. Progressed resistance and complexity of movement as indicated. Date:  2/22/2021 Date:   Date:     Activity/Exercise Parameters Parameters Parameters    Hip hinge  2 x 10        Dead lift  5# 5x   10# 5 x   15# 3 x 5 HR 77         Sit to stand  5 x   5# 3 x 5 HR 80 30-60 sec breaks between sets        Bridges   Orange band 3 x 10                                            MANUAL THERAPY: (0 minutes): Joint mobilization, Soft tissue mobilization was utilized and necessary because of the patient's restricted joint motion and restricted motion of soft tissue mobility. Date  2/22/2021    Technique Used Grade  Level # Time(s) Effect while being performed                                                                 HEP Log Date 1.    2/22/2021   2.  2/22/2021   3. 2/22/2021   4.    5.           [employer] Portal  Treatment/Session Summary:    Response to Treatment: Pt demonstrated understanding of POC and initial HEP. No increase in pain or adverse reactions. Communication/Consultation:  POC, HEP, PT goals, Faxed initial evaluation to MD.   Equipment provided today: HEP Handout   Recommendations/Intent for next treatment session:   Next visit will focus on functional strengthening, flexibility, health and wellness instruction. Treatment Plan of Care Effective Dates: 2/22/2021 TO 4/18/2021 (60 days).   Frequency/Duration: 2 times a week for 60 Days             Total Treatment Billable Duration:   40  Rx   PT Patient Time In/Time Out  Time In: 1522  Time Out: [address], RT    Future Appointments   Date Time Provider Jeremiah Krishnamurthy   2/23/2021  9:30 AM SFE MOBILE MAMMO [facility] SFE   2/24/2021  1:00 PM Daniel Chew, RT [facility]   3/1/2021  2:30 PM Daniel Chew, RT [facility]   3/2/2021 11:00 AM DEVICE 39 [facility]   3/3/2021 10:30 AM Suzanne Chew, RT [facility]   3/9/2021 11:00 AM Daniel Chew, RT [facility]   3/11/2021 11:45 AM PT [facility]VL [facility]   3/12/2021 1:00 PM Taylor Chew Ra, RT [facility]   3/16/2021 11:00 AM Taylor Chew Ra, RT [facility]   3/18/2021 11:00 AM Taylor Chew Ra, RT [facility]   4/14/2021 10:30 AM ECHO 52 [facility]   4/27/2021 10:00 AM Maryjo Wolf MD [facility]

## 2021-02-23 ENCOUNTER — HOSPITAL ENCOUNTER (OUTPATIENT)
Dept: MAMMOGRAPHY | Age: 76
Discharge: HOME | End: 2021-02-23
Attending: INTERNAL MEDICINE
Payer: MEDICARE

## 2021-02-23 DIAGNOSIS — Z12.31 ENCOUNTER FOR SCREENING MAMMOGRAM FOR MALIGNANT NEOPLASM OF BREAST: ICD-10-CM

## 2021-02-23 PROCEDURE — 77067 SCR MAMMO BI INCL CAD: CPT

## 2021-02-24 ENCOUNTER — HOSPITAL ENCOUNTER (OUTPATIENT)
Dept: PHYSICAL THERAPY | Age: 76
Discharge: HOME | End: 2021-02-24
Payer: MEDICARE

## 2021-02-24 PROCEDURE — 97110 THERAPEUTIC EXERCISES: CPT

## 2021-02-24 PROCEDURE — 97530 THERAPEUTIC ACTIVITIES: CPT

## 2021-02-24 NOTE — PROGRESS NOTES
Alma Rinku Mode  : 1945  [place]: [place] / Product Type: Medicare /  Rober Goldberg at [facility]. [facility] [address], [address] [place] [address]  [phone]   [phone]                                                          Charlotte Anthony NP      OUTPATIENT PHYSICAL THERAPY: Daily Treatment Note 2021 Visit Count:  3    Tx Diagnosis:  Lumbago with Sciatica Right side (M54.41)  Muscle Weakness, Generalized (M62.81)      Pre-treatment Symptoms/Complaints: Tweaked my back getting off floor today   Pain: Initial: 7/10  Medications Last Reviewed:  2021     Post Session: 2/10   Updated Objective Findings: Antalgic gait entering clinic        TREATMENT:   THERAPEUTIC EXERCISE: (30 minutes):  Exercises per grid below to improve mobility, strength and balance. Required minimal visual, verbal and manual cues to promote proper body alignment and promote proper body posture. Progressed resistance and complexity of movement as indicated. Date:  2021 Date:  21 Date:  21   Activity/Exercise Parameters Parameters Parameters   Education HEP, POC, PT goals, anatomy/pathology, health benefits of exercise  Safe pain parameters traffic light system   Treadmill   2.3 mph 9.0 min 2.0 mph 10 min   STS chair 1 x 12     Toe touches 1 x 10 1 x 10 10 x   Back bends 1 x 10     Sciatic nerve glides  20 x bilateral  2 x 10 ea   Cat and camels  10 x 2 x 10   Lower trunk rotation  20 x 20 x   Overhead reach with pole  10 x 10 x   Side bend stretch  5 x ea 10 x   Sitting flexion stretch   2 x 5                     THERAPEUTIC ACTIVITY: ( 15 minutes): Activities per gid below to improve functional movement related mobility, strength and balance to improve neuro-muscular carryover to daily functional activities for improving patient's quality of life.  Required visual, verbal and manual cues to promote proper body alignment and promote proper body posture/mechanics. Progressed resistance and complexity of movement as indicated. Date:  2/22/2021 Date:  2/24/21 Date:     Activity/Exercise Parameters Parameters Parameters    Hip hinge  2 x 10  10 x      Dead lift  5# 5x   10# 5 x   15# 3 x 5 HR 77   5# bar 3 x 5      Sit to stand  5 x   5# 3 x 5 HR 80 30-60 sec breaks between sets  5 x  No wt   5# 3 x 5 30 sec breaks   19\"      Bridges   Orange band 3 x 10   2 x 10 orange      Overhead press    3 x 5 5#                                          HEP Log Date 1.    2/24/2021   2.  2/24/2021   3. 2/24/2021   4.    5.           [employer] Portal  Treatment/Session Summary:    Response to Treatment: Pt stated that the pain subsided with exercise today. Communication/Consultation:  Safe pain parameters ok to ex with dull pain    Equipment provided today: none   Recommendations/Intent for next treatment session:   Next visit will focus on functional strengthening, flexibility, health and wellness instruction. Treatment Plan of Care Effective Dates: 2/16/2021 TO 4/18/2021 (60 days).   Frequency/Duration: 2 times a week for 60 Days             Total Treatment Billable Duration:   45  Rx   PT Patient Time In/Time Out  Time In: 1300  Time Out: [address], RT    Future Appointments   Date Time Provider Jeremiah Krishnamurthy   3/1/2021  2:30 PM Nguyen Chew, RT [facility]   3/2/2021 11:00 AM DEVICE 39 [facility]   3/3/2021 10:30 AM Suzanne Chew, RT [facility]   3/9/2021 11:00 AM Nguyen Chew, RT [facility]   3/11/2021 11:45 AM PT GVL [facility] U[facility]   3/12/2021  1:00 PM Nguyen Chew, RT [facility]   3/16/2021 11:00 AM Nguyen Chew, RT [facility]   3/18/2021 11:00 AM Suzanne Chew, RT [facility]   4/14/2021 10:30 AM ECHO 52 [facility]   4/27/2021 10:00 AM Natanael Wolf MD [facility]

## 2021-03-01 ENCOUNTER — APPOINTMENT (OUTPATIENT)
Dept: PHYSICAL THERAPY | Age: 76
End: 2021-03-01
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
## 2021-03-03 ENCOUNTER — APPOINTMENT (OUTPATIENT)
Dept: PHYSICAL THERAPY | Age: 76
End: 2021-03-03

## 2021-03-05 ENCOUNTER — HOSPITAL ENCOUNTER (OUTPATIENT)
Dept: CT IMAGING | Age: 76
Discharge: HOME | End: 2021-03-05
Attending: INTERNAL MEDICINE
Payer: MEDICARE

## 2021-03-05 DIAGNOSIS — Z95.0 PACEMAKER: ICD-10-CM

## 2021-03-05 DIAGNOSIS — M54.31 SCIATICA OF RIGHT SIDE: ICD-10-CM

## 2021-03-05 DIAGNOSIS — Z86.79 HX OF BACTERIAL ENDOCARDITIS: ICD-10-CM

## 2021-03-05 DIAGNOSIS — I48.20 CHRONIC A-FIB (HCC): ICD-10-CM

## 2021-03-05 DIAGNOSIS — Z95.2 HISTORY OF PROSTHETIC HEART VALVE: ICD-10-CM

## 2021-03-05 PROCEDURE — 72131 CT LUMBAR SPINE W/O DYE: CPT

## 2021-03-09 ENCOUNTER — APPOINTMENT (OUTPATIENT)
Dept: PHYSICAL THERAPY | Age: 76
End: 2021-03-09

## 2021-03-10 ENCOUNTER — HOSPITAL ENCOUNTER (OUTPATIENT)
Dept: LAB | Age: 76
Discharge: HOME | End: 2021-03-10
Payer: MEDICARE

## 2021-03-10 DIAGNOSIS — I50.22 CHRONIC SYSTOLIC HEART FAILURE (HCC): ICD-10-CM

## 2021-03-10 DIAGNOSIS — Z79.01 LONG TERM (CURRENT) USE OF ANTICOAGULANTS: ICD-10-CM

## 2021-03-10 DIAGNOSIS — Z95.0 PACEMAKER: ICD-10-CM

## 2021-03-10 DIAGNOSIS — Z95.3 HISTORY OF AORTIC VALVE REPLACEMENT WITH BIOPROSTHETIC VALVE: ICD-10-CM

## 2021-03-10 LAB
ANION GAP SERPL CALC-SCNC: 3 MMOL/L (ref 7–16)
BASOPHILS # BLD: 0 K/UL (ref 0–0.2)
BASOPHILS NFR BLD: 0 % (ref 0–2)
BUN SERPL-MCNC: 33 MG/DL (ref 8–23)
CALCIUM SERPL-MCNC: 9.3 MG/DL (ref 8.3–10.4)
CHLORIDE SERPL-SCNC: 110 MMOL/L (ref 98–107)
CO2 SERPL-SCNC: 27 MMOL/L (ref 21–32)
CREAT SERPL-MCNC: 1.1 MG/DL (ref 0.6–1)
DIFFERENTIAL METHOD BLD: ABNORMAL
EOSINOPHIL # BLD: 0.1 K/UL (ref 0–0.8)
EOSINOPHIL NFR BLD: 2 % (ref 0.5–7.8)
ERYTHROCYTE [DISTWIDTH] IN BLOOD BY AUTOMATED COUNT: 12.6 % (ref 11.9–14.6)
GLUCOSE SERPL-MCNC: 95 MG/DL (ref 65–100)
HCT VFR BLD AUTO: 36.8 % (ref 35.8–46.3)
HGB BLD-MCNC: 12.1 G/DL (ref 11.7–15.4)
IMM GRANULOCYTES # BLD AUTO: 0 K/UL (ref 0–0.5)
IMM GRANULOCYTES NFR BLD AUTO: 1 % (ref 0–5)
INR PPP: 2.8
LYMPHOCYTES # BLD: 0.8 K/UL (ref 0.5–4.6)
LYMPHOCYTES NFR BLD: 10 % (ref 13–44)
MCH RBC QN AUTO: 30.9 PG (ref 26.1–32.9)
MCHC RBC AUTO-ENTMCNC: 32.9 G/DL (ref 31.4–35)
MCV RBC AUTO: 93.9 FL (ref 79.6–97.8)
MONOCYTES # BLD: 0.6 K/UL (ref 0.1–1.3)
MONOCYTES NFR BLD: 7 % (ref 4–12)
NEUTS SEG # BLD: 6.5 K/UL (ref 1.7–8.2)
NEUTS SEG NFR BLD: 81 % (ref 43–78)
NRBC # BLD: 0 K/UL (ref 0–0.2)
PLATELET # BLD AUTO: 152 K/UL (ref 150–450)
PMV BLD AUTO: 11.4 FL (ref 9.4–12.3)
POTASSIUM SERPL-SCNC: 4.3 MMOL/L (ref 3.5–5.1)
PROTHROMBIN TIME: 29.3 SEC (ref 12.5–14.7)
RBC # BLD AUTO: 3.92 M/UL (ref 4.05–5.2)
SODIUM SERPL-SCNC: 140 MMOL/L (ref 136–145)
WBC # BLD AUTO: 8.1 K/UL (ref 4.3–11.1)

## 2021-03-10 PROCEDURE — 36415 COLL VENOUS BLD VENIPUNCTURE: CPT

## 2021-03-10 PROCEDURE — 80048 BASIC METABOLIC PNL TOTAL CA: CPT

## 2021-03-10 PROCEDURE — 85610 PROTHROMBIN TIME: CPT

## 2021-03-10 PROCEDURE — 85025 COMPLETE CBC W/AUTO DIFF WBC: CPT

## 2021-03-12 ENCOUNTER — APPOINTMENT (OUTPATIENT)
Dept: PHYSICAL THERAPY | Age: 76
End: 2021-03-12

## 2021-03-16 ENCOUNTER — APPOINTMENT (OUTPATIENT)
Dept: PHYSICAL THERAPY | Age: 76
End: 2021-03-16

## 2021-03-16 NOTE — PROGRESS NOTES
Patient pre-assessment complete for Generator change with Dr. Luis Angel Ames scheduled for 3/17/2021, arrival time 0730. Patient verified using . Patient instructed to bring all home medications in labeled bottles on the day of procedure. NPO status reinforced. Patient instructed to HOLD warfarin and lasix. Instructed they can take all other medications excluding vitamins & supplements. Patient verbalizes understanding of all instructions & denies any questions at this time.

## 2021-03-17 ENCOUNTER — HOSPITAL ENCOUNTER (OUTPATIENT)
Dept: CARDIAC CATH/INVASIVE PROCEDURES | Age: 76
Discharge: HOME | End: 2021-03-17
Attending: INTERNAL MEDICINE | Admitting: INTERNAL MEDICINE
Payer: MEDICARE

## 2021-03-17 VITALS
TEMPERATURE: 98.2 F | HEIGHT: 66 IN | HEART RATE: 70 BPM | DIASTOLIC BLOOD PRESSURE: 50 MMHG | OXYGEN SATURATION: 95 % | WEIGHT: 194 LBS | BODY MASS INDEX: 31.18 KG/M2 | RESPIRATION RATE: 16 BRPM | SYSTOLIC BLOOD PRESSURE: 104 MMHG

## 2021-03-17 DIAGNOSIS — I50.22 CHRONIC SYSTOLIC HEART FAILURE (HCC): ICD-10-CM

## 2021-03-17 DIAGNOSIS — I48.20 CHRONIC A-FIB (HCC): ICD-10-CM

## 2021-03-17 DIAGNOSIS — Z95.0 CARDIAC PACEMAKER: ICD-10-CM

## 2021-03-17 LAB
ATRIAL RATE: 73 BPM
CALCULATED R AXIS, ECG10: -179 DEGREES
CALCULATED T AXIS, ECG11: 53 DEGREES
DIAGNOSIS, 93000: NORMAL
INR PPP: 2.6
PROTHROMBIN TIME: 27.7 SEC (ref 12.5–14.7)
Q-T INTERVAL, ECG07: 438 MS
QRS DURATION, ECG06: 118 MS
QTC CALCULATION (BEZET), ECG08: 473 MS
VENTRICULAR RATE, ECG03: 70 BPM

## 2021-03-17 PROCEDURE — 33228 REMV&REPLC PM GEN DUAL LEAD: CPT

## 2021-03-17 PROCEDURE — 77030036615 HC IMPL ENV ANTIBACT MEDT -G

## 2021-03-17 PROCEDURE — 74011250637 HC RX REV CODE- 250/637: Performed by: INTERNAL MEDICINE

## 2021-03-17 PROCEDURE — 77030041279 HC DRSG PRMSL AG MDII -B

## 2021-03-17 PROCEDURE — 74011000250 HC RX REV CODE- 250: Performed by: INTERNAL MEDICINE

## 2021-03-17 PROCEDURE — 99153 MOD SED SAME PHYS/QHP EA: CPT

## 2021-03-17 PROCEDURE — 93005 ELECTROCARDIOGRAM TRACING: CPT | Performed by: INTERNAL MEDICINE

## 2021-03-17 PROCEDURE — 99152 MOD SED SAME PHYS/QHP 5/>YRS: CPT | Performed by: INTERNAL MEDICINE

## 2021-03-17 PROCEDURE — 77030010507 HC ADH SKN DERMBND J&J -B

## 2021-03-17 PROCEDURE — C1785 PMKR, DUAL, RATE-RESP: HCPCS

## 2021-03-17 PROCEDURE — 77030031139 HC SUT VCRL2 J&J -A

## 2021-03-17 PROCEDURE — 74011000272 HC RX REV CODE- 272: Performed by: INTERNAL MEDICINE

## 2021-03-17 PROCEDURE — 77030031304 HC WAVWGD EIGR DISP INVO -D

## 2021-03-17 PROCEDURE — 85610 PROTHROMBIN TIME: CPT

## 2021-03-17 PROCEDURE — 33228 REMV&REPLC PM GEN DUAL LEAD: CPT | Performed by: INTERNAL MEDICINE

## 2021-03-17 PROCEDURE — 77030003028 HC SUT VCRL J&J -A

## 2021-03-17 PROCEDURE — 99152 MOD SED SAME PHYS/QHP 5/>YRS: CPT

## 2021-03-17 PROCEDURE — 74011250636 HC RX REV CODE- 250/636: Performed by: INTERNAL MEDICINE

## 2021-03-17 RX ORDER — SODIUM CHLORIDE 9 MG/ML
75 INJECTION, SOLUTION INTRAVENOUS CONTINUOUS
Status: DISCONTINUED | OUTPATIENT
Start: 2021-03-17 | End: 2021-03-17 | Stop reason: HOSPADM

## 2021-03-17 RX ORDER — SODIUM CHLORIDE 0.9 % (FLUSH) 0.9 %
5-40 SYRINGE (ML) INJECTION EVERY 8 HOURS
Status: DISCONTINUED | OUTPATIENT
Start: 2021-03-17 | End: 2021-03-17 | Stop reason: HOSPADM

## 2021-03-17 RX ORDER — GUAIFENESIN 100 MG/5ML
81 LIQUID (ML) ORAL ONCE
Status: DISCONTINUED | OUTPATIENT
Start: 2021-03-17 | End: 2021-03-17 | Stop reason: HOSPADM

## 2021-03-17 RX ORDER — FENTANYL CITRATE 50 UG/ML
25-50 INJECTION, SOLUTION INTRAMUSCULAR; INTRAVENOUS
Status: DISCONTINUED | OUTPATIENT
Start: 2021-03-17 | End: 2021-03-17 | Stop reason: HOSPADM

## 2021-03-17 RX ORDER — SODIUM CHLORIDE 0.9 % (FLUSH) 0.9 %
5-40 SYRINGE (ML) INJECTION AS NEEDED
Status: DISCONTINUED | OUTPATIENT
Start: 2021-03-17 | End: 2021-03-17 | Stop reason: HOSPADM

## 2021-03-17 RX ORDER — CEFAZOLIN SODIUM/WATER 2 G/20 ML
2 SYRINGE (ML) INTRAVENOUS ONCE
Status: COMPLETED | OUTPATIENT
Start: 2021-03-17 | End: 2021-03-17

## 2021-03-17 RX ORDER — MIDAZOLAM HYDROCHLORIDE 1 MG/ML
.5-2 INJECTION, SOLUTION INTRAMUSCULAR; INTRAVENOUS
Status: DISCONTINUED | OUTPATIENT
Start: 2021-03-17 | End: 2021-03-17 | Stop reason: HOSPADM

## 2021-03-17 RX ORDER — LIDOCAINE HYDROCHLORIDE 10 MG/ML
1-30 INJECTION, SOLUTION EPIDURAL; INFILTRATION; INTRACAUDAL; PERINEURAL ONCE
Status: COMPLETED | OUTPATIENT
Start: 2021-03-17 | End: 2021-03-17

## 2021-03-17 RX ORDER — HYDROCODONE BITARTRATE AND ACETAMINOPHEN 5; 325 MG/1; MG/1
1 TABLET ORAL
Qty: 20 TAB | Refills: 0 | Status: SHIPPED | OUTPATIENT
Start: 2021-03-17 | End: 2021-03-22

## 2021-03-17 RX ORDER — ACETAMINOPHEN 325 MG/1
650 TABLET ORAL
Status: DISCONTINUED | OUTPATIENT
Start: 2021-03-17 | End: 2021-03-17 | Stop reason: HOSPADM

## 2021-03-17 RX ADMIN — MIDAZOLAM 1 MG: 1 INJECTION INTRAMUSCULAR; INTRAVENOUS at 09:38

## 2021-03-17 RX ADMIN — CEFAZOLIN 2 G: 1 INJECTION, POWDER, FOR SOLUTION INTRAVENOUS at 09:34

## 2021-03-17 RX ADMIN — MIDAZOLAM 1 MG: 1 INJECTION INTRAMUSCULAR; INTRAVENOUS at 09:52

## 2021-03-17 RX ADMIN — FENTANYL CITRATE 25 MCG: 50 INJECTION, SOLUTION INTRAMUSCULAR; INTRAVENOUS at 10:07

## 2021-03-17 RX ADMIN — FENTANYL CITRATE 25 MCG: 50 INJECTION, SOLUTION INTRAMUSCULAR; INTRAVENOUS at 09:52

## 2021-03-17 RX ADMIN — CEFAZOLIN 2 G: 1 INJECTION, POWDER, FOR SOLUTION INTRAVENOUS at 14:40

## 2021-03-17 RX ADMIN — ACETAMINOPHEN 650 MG: 325 TABLET ORAL at 11:40

## 2021-03-17 RX ADMIN — LIDOCAINE HYDROCHLORIDE 20 ML: 10 INJECTION, SOLUTION EPIDURAL; INFILTRATION; INTRACAUDAL; PERINEURAL at 10:03

## 2021-03-17 RX ADMIN — MIDAZOLAM 1 MG: 1 INJECTION INTRAMUSCULAR; INTRAVENOUS at 09:59

## 2021-03-17 RX ADMIN — FENTANYL CITRATE 25 MCG: 50 INJECTION, SOLUTION INTRAMUSCULAR; INTRAVENOUS at 09:38

## 2021-03-17 RX ADMIN — FENTANYL CITRATE 25 MCG: 50 INJECTION, SOLUTION INTRAMUSCULAR; INTRAVENOUS at 09:43

## 2021-03-17 RX ADMIN — MIDAZOLAM 1 MG: 1 INJECTION INTRAMUSCULAR; INTRAVENOUS at 10:07

## 2021-03-17 RX ADMIN — MIDAZOLAM 1 MG: 1 INJECTION INTRAMUSCULAR; INTRAVENOUS at 09:43

## 2021-03-17 RX ADMIN — NEOMYCIN AND POLYMYXIN B SULFATES 1000 ML: 40; 200000 SOLUTION IRRIGATION at 10:10

## 2021-03-17 NOTE — PROGRESS NOTES
Discharge instructions given to patient and family. Educated on pressure dressing removal in AM. Left arm placed in sling. IV removed, tip intact. Patient transferred via wheelchair for discharge.

## 2021-03-17 NOTE — PROCEDURES
[facility]  CARDIAC CATH    Name:  Max Proctor  MR#:  [id number]  :  1945  ACCOUNT #:  [de-identified]  DATE OF SERVICE:  2021    REFERRING PHYSICIAN:  Harini Khanna MD    PROCEDURES PERFORMED:  Pacemaker generator change out. PREOPERATIVE DIAGNOSIS:  Pacemaker at elective replacement indicator. POSTOPERATIVE DIAGNOSIS:  Pacemaker at elective replacement indicator. SURGEON:  Yahaira Triana MD    ASSISTANT:  None. ESTIMATED BLOOD LOSS:  Less than 5 mL. SPECIMENS REMOVED:  None. COMPLICATIONS:  None. IMPLANTS:  Medtronic [place] XT DR LELAND Benson, serial number [id number]. ANESTHESIA:  The patient was sedated by Florencia Schlatter with 5 mg Versed, 100 mcg fentanyl, and monitored from 9:39 to 10:33 a.m.    TECHNIQUE:  After informed consent was obtained, the patient was brought to the cath lab, prepped and draped in usual fashion. The skin and subcutaneous tissue overlying the previous pacer incision was anesthetized with lidocaine and a 2.5-inch incision made using a scalpel over the preexisting scar. PhotonBlade cautery was carried out to the capsule and the capsule was incised. The chronic [employer] generator was explanted and the leads were tested. P-waves were 1.4 mV with atrial fibrillation which is chronic. Impedance was 418 ohms. R-waves were 14.4 mV with a lead impedance of 361 ohms and a threshold of 0.75 volt at a pulse width of 0.4 millisecond. After copious antibiotic saline irrigation and verification of hemostasis, the generator was placed into a [employer] antibiotic pouch. The entire device was placed into the pocket. The deep tissue and capsule were closed using interrupted sutures of 2-0 Vicryl. A running mid layer of 3-0 Vicryl was sewn in subcutaneous tissue and the skin was approximated using a horizontal mattress suture of 4-0 Vicryl. Dermabond and a pressure dressing were applied.     MODE:  VVIR, lower rate limit 70, upper rate limit 120. CONCLUSION:  Successful Medtronic pacemaker generator placement with removal of the [employer] generator which was at [employer]. The patient now has a Medtronic system with leads and generator and should be MRI compatible.       Reddy Solis MD      [id number]  D:  03/17/2021 10:42  T:  03/17/2021 13:45  JOB #:  [id number]  CC:  Danyell Pederson MD       [facility] Cardiology

## 2021-03-17 NOTE — PROGRESS NOTES
TRANSFER - IN REPORT:    Verbal report received from YANELIS Bangura on [place]  being received from  for routine progression of care      Report consisted of patients Situation, Background, Assessment and   Recommendations(SBAR). Information from the following report(s) SBAR, Procedure Summary, MAR and Recent Results was reviewed with the receiving nurse. Opportunity for questions and clarification was provided. Assessment completed upon patients arrival to unit and care assumed.

## 2021-03-17 NOTE — PROGRESS NOTES
Pressure dressing intact and sandbag placed per MD order. Patient repositioned in bed, HOB elevated. Lunch tray and PO fluids given. Daughter at bedside along with pacer rep.

## 2021-03-17 NOTE — PROGRESS NOTES
Patient received to [address] [place] [address] room # 12  Ambulatory from [facility]. Patient scheduled for Generator changer today with Dr Anthony Martínez. Procedure reviewed & questions answered, voiced good understanding consent obtained & placed on chart. All medications and medical history reviewed. Will prep patient per orders. Patient & family updated on plan of care.       The patient has a fraility score of 3-MANAGING WELL, based on reports no falls

## 2021-03-17 NOTE — PROCEDURES
Brief Cardiac Procedure Note    Patient: Emily Young MRN: [id number]  SSN: [id number]    YOB: 1945  Age: 77 y.o. Sex: female      Date of Procedure: 3/17/2021     Pre-procedure Diagnosis: PACER AT RODRICK    Post-procedure Diagnosis: SAME    Reason for Procedure: RODRICK    Procedure: PACER GEN CHANGE    Brief Description of Procedure: REPLACED [facility] GEN FOR MEDTRONIC GEN, HAS MEDTRONIC LEADS, SO NOW CAN SAFELY HAVE MRI's    Performed By: Shannon Ramsey MD     Assistants: NONE    Anesthesia: Moderate Sedation    Estimated Blood Loss: Less than 10 mL      Specimens: None    Implants: None    Findings: ALL COUNTS CORRECT    Complications: None    Recommendations: Continue medical therapy.     Signed By: Shannon Ramsey MD     March 17, 2021

## 2021-03-17 NOTE — DISCHARGE INSTRUCTIONS
Post Op Device Instructions        Incision & Dressing Care   Please keep Aquacel dressing on wound until your 2 week follow up in device clinic. The dressing promotes healing and is meant to stay on the wound. Keep incision site completely dry for 48 hours. During this time you may take a sponge bath, but no shower. After 48 hours you may shower with your back to the water letting the water run over the dressing. Do not let the water directly hit the dressing, it is water resistant no water proof. Do not use any lotions, creams, or ointments on the incision.    Avoid manipulating the device or leads with your fingers. Do not massage or excessively rub the implant site. This could displace the leads      For four weeks after your implant  Wear sling every night and during day.    Do not lift anything heavier than a gallon of milk.   Do not raise your elbow above the level of your shoulder on the Left shoulder implant side.   Avoid excessive pushing, pulling, or twisting.     Call you doctor for any of the following:   Any signs of infection, including redness, swelling or pain at the incision site, or a   temperature of 101° F or greater.   If you have twitching chest muscles, hiccups that won't stop, or a swollen arm on the   side of the incision.   Any feelings of light-headedness, chest pain, or shortness of breath.   Please notify your doctors and dentists that you have an ICD.       You should not drive until your first follow-up appointment.         About 1 month after implant, you will receive a card by mail from the company who manufactured your ICD. Your device was manufactured by [employer]. You should carry this card with you at all times.                   Please call the Device Clinic at  [phone] if you have questions or concerns about your device. Please call the hospital if it is after 5 pm or the weekend please page the on call cardiologist at [facility] at [phone]         Follow  Up Appointments  You will need to have your device checked 1- 2 weeks after the procedure and should have an appointment with the device clinic. If you do not hear from them call the device clinic.

## 2021-03-18 ENCOUNTER — APPOINTMENT (OUTPATIENT)
Dept: PHYSICAL THERAPY | Age: 76
End: 2021-03-18

## 2021-06-07 ENCOUNTER — TRANSCRIBE ORDER (OUTPATIENT)
Dept: SCHEDULING | Age: 76
End: 2021-06-07

## 2021-06-07 ENCOUNTER — HOSPITAL ENCOUNTER (OUTPATIENT)
Dept: ULTRASOUND IMAGING | Age: 76
Discharge: HOME | End: 2021-06-07
Payer: MEDICARE

## 2021-06-07 DIAGNOSIS — M79.661 PAIN IN RIGHT LOWER LEG: ICD-10-CM

## 2021-06-07 DIAGNOSIS — R22.41 LOCALIZED SWELLING, MASS AND LUMP, LOWER LIMB, RIGHT: Primary | ICD-10-CM

## 2021-06-07 DIAGNOSIS — R22.41 LOCALIZED SWELLING, MASS AND LUMP, LOWER LIMB, RIGHT: ICD-10-CM

## 2021-06-07 PROCEDURE — 93971 EXTREMITY STUDY: CPT

## 2021-06-18 ENCOUNTER — HOSPITAL ENCOUNTER (OUTPATIENT)
Dept: MRI IMAGING | Age: 76
Discharge: HOME | End: 2021-06-18
Attending: INTERNAL MEDICINE
Payer: MEDICARE

## 2021-06-18 DIAGNOSIS — M25.561 ACUTE PAIN OF RIGHT KNEE: ICD-10-CM

## 2021-06-18 PROCEDURE — 73721 MRI JNT OF LWR EXTRE W/O DYE: CPT

## 2022-02-08 ENCOUNTER — TRANSCRIBE ORDER (OUTPATIENT)
Dept: SCHEDULING | Age: 77
End: 2022-02-08

## 2022-02-08 DIAGNOSIS — Z12.31 SCREENING MAMMOGRAM FOR HIGH-RISK PATIENT: Primary | ICD-10-CM

## 2022-02-24 ENCOUNTER — HOSPITAL ENCOUNTER (OUTPATIENT)
Dept: MAMMOGRAPHY | Age: 77
Discharge: HOME | End: 2022-02-24
Attending: INTERNAL MEDICINE
Payer: MEDICARE

## 2022-02-24 DIAGNOSIS — Z12.31 SCREENING MAMMOGRAM FOR HIGH-RISK PATIENT: ICD-10-CM

## 2022-02-24 PROCEDURE — 77063 BREAST TOMOSYNTHESIS BI: CPT

## 2022-03-19 PROBLEM — Z79.01 LONG TERM (CURRENT) USE OF ANTICOAGULANTS: Status: ACTIVE | Noted: 2018-07-18

## 2022-03-19 PROBLEM — I48.21 PERMANENT ATRIAL FIBRILLATION (HCC): Status: ACTIVE | Noted: 2019-10-03

## 2022-06-29 RX ORDER — LOSARTAN POTASSIUM 25 MG/1
TABLET ORAL
Qty: 45 TABLET | Refills: 3 | OUTPATIENT
Start: 2022-06-29

## 2022-07-19 ENCOUNTER — ANTI-COAG VISIT (OUTPATIENT)
Dept: CARDIOLOGY CLINIC | Age: 77
End: 2022-07-19
Payer: MEDICARE

## 2022-07-19 DIAGNOSIS — Z95.2 HISTORY OF MITRAL VALVE REPLACEMENT WITH MECHANICAL VALVE: Primary | ICD-10-CM

## 2022-07-19 DIAGNOSIS — Z79.01 LONG TERM (CURRENT) USE OF ANTICOAGULANTS: ICD-10-CM

## 2022-07-19 DIAGNOSIS — Z95.2 PRESENCE OF PROSTHETIC HEART VALVE: ICD-10-CM

## 2022-07-19 DIAGNOSIS — I48.20 CHRONIC ATRIAL FIBRILLATION, UNSPECIFIED (HCC): ICD-10-CM

## 2022-07-19 DIAGNOSIS — Z95.3 HISTORY OF AORTIC VALVE REPLACEMENT WITH BIOPROSTHETIC VALVE: ICD-10-CM

## 2022-07-19 DIAGNOSIS — I48.21 PERMANENT ATRIAL FIBRILLATION (HCC): ICD-10-CM

## 2022-07-19 LAB
POC INR: 3
PROTHROMBIN TIME, POC: NORMAL
VALID INTERNAL CONTROL, POC: NORMAL

## 2022-07-19 PROCEDURE — 85610 PROTHROMBIN TIME: CPT | Performed by: INTERNAL MEDICINE

## 2022-07-19 PROCEDURE — 93793 ANTICOAG MGMT PT WARFARIN: CPT | Performed by: INTERNAL MEDICINE

## 2022-07-26 DIAGNOSIS — E78.5 DYSLIPIDEMIA: ICD-10-CM

## 2022-07-26 DIAGNOSIS — Z79.01 LONG TERM (CURRENT) USE OF ANTICOAGULANTS: Primary | ICD-10-CM

## 2022-07-26 DIAGNOSIS — I10 ESSENTIAL HYPERTENSION: ICD-10-CM

## 2022-07-26 DIAGNOSIS — E03.9 ACQUIRED HYPOTHYROIDISM: ICD-10-CM

## 2022-07-28 ENCOUNTER — NURSE ONLY (OUTPATIENT)
Dept: INTERNAL MEDICINE CLINIC | Facility: CLINIC | Age: 77
End: 2022-07-28

## 2022-07-28 DIAGNOSIS — E03.9 ACQUIRED HYPOTHYROIDISM: ICD-10-CM

## 2022-07-28 DIAGNOSIS — Z79.01 LONG TERM (CURRENT) USE OF ANTICOAGULANTS: ICD-10-CM

## 2022-07-28 DIAGNOSIS — E78.5 DYSLIPIDEMIA: ICD-10-CM

## 2022-07-28 DIAGNOSIS — I10 ESSENTIAL HYPERTENSION: ICD-10-CM

## 2022-07-28 LAB
APPEARANCE UR: ABNORMAL
BACTERIA URNS QL MICRO: NEGATIVE /HPF
BASOPHILS # BLD: 0 K/UL (ref 0–0.2)
BASOPHILS NFR BLD: 0 % (ref 0–2)
BILIRUB UR QL: NEGATIVE
CASTS URNS QL MICRO: ABNORMAL /LPF
COLOR UR: ABNORMAL
DIFFERENTIAL METHOD BLD: ABNORMAL
EOSINOPHIL # BLD: 0.2 K/UL (ref 0–0.8)
EOSINOPHIL NFR BLD: 4 % (ref 0.5–7.8)
EPI CELLS #/AREA URNS HPF: ABNORMAL /HPF
ERYTHROCYTE [DISTWIDTH] IN BLOOD BY AUTOMATED COUNT: 12.6 % (ref 11.9–14.6)
GLUCOSE UR STRIP.AUTO-MCNC: NEGATIVE MG/DL
HCT VFR BLD AUTO: 35 % (ref 35.8–46.3)
HGB BLD-MCNC: 11.2 G/DL (ref 11.7–15.4)
HGB UR QL STRIP: NEGATIVE
IMM GRANULOCYTES # BLD AUTO: 0 K/UL (ref 0–0.5)
IMM GRANULOCYTES NFR BLD AUTO: 0 % (ref 0–5)
KETONES UR QL STRIP.AUTO: NEGATIVE MG/DL
LEUKOCYTE ESTERASE UR QL STRIP.AUTO: NEGATIVE
LYMPHOCYTES # BLD: 0.8 K/UL (ref 0.5–4.6)
LYMPHOCYTES NFR BLD: 18 % (ref 13–44)
MCH RBC QN AUTO: 30.6 PG (ref 26.1–32.9)
MCHC RBC AUTO-ENTMCNC: 32 G/DL (ref 31.4–35)
MCV RBC AUTO: 95.6 FL (ref 79.6–97.8)
MONOCYTES # BLD: 0.5 K/UL (ref 0.1–1.3)
MONOCYTES NFR BLD: 10 % (ref 4–12)
MUCOUS THREADS URNS QL MICRO: 0 /LPF
NEUTS SEG # BLD: 3.1 K/UL (ref 1.7–8.2)
NEUTS SEG NFR BLD: 68 % (ref 43–78)
NITRITE UR QL STRIP.AUTO: NEGATIVE
NRBC # BLD: 0 K/UL (ref 0–0.2)
PH UR STRIP: 5 [PH] (ref 5–9)
PLATELET # BLD AUTO: 159 K/UL (ref 150–450)
PMV BLD AUTO: 11.2 FL (ref 9.4–12.3)
PROT UR STRIP-MCNC: NEGATIVE MG/DL
RBC # BLD AUTO: 3.66 M/UL (ref 4.05–5.2)
RBC #/AREA URNS HPF: ABNORMAL /HPF
SP GR UR REFRACTOMETRY: 1.02 (ref 1–1.02)
URINE CULTURE IF INDICATED: ABNORMAL
UROBILINOGEN UR QL STRIP.AUTO: 0.2 EU/DL (ref 0.2–1)
WBC # BLD AUTO: 4.6 K/UL (ref 4.3–11.1)
WBC URNS QL MICRO: ABNORMAL /HPF

## 2022-07-29 LAB
ALBUMIN SERPL-MCNC: 4 G/DL (ref 3.2–4.6)
ALBUMIN/GLOB SERPL: 1.4 {RATIO} (ref 1.2–3.5)
ALP SERPL-CCNC: 88 U/L (ref 50–136)
ALT SERPL-CCNC: 16 U/L (ref 12–65)
ANION GAP SERPL CALC-SCNC: 8 MMOL/L (ref 7–16)
AST SERPL-CCNC: 17 U/L (ref 15–37)
BILIRUB SERPL-MCNC: 0.6 MG/DL (ref 0.2–1.1)
BUN SERPL-MCNC: 25 MG/DL (ref 8–23)
CALCIUM SERPL-MCNC: 8.9 MG/DL (ref 8.3–10.4)
CHLORIDE SERPL-SCNC: 110 MMOL/L (ref 98–107)
CHOLEST SERPL-MCNC: 209 MG/DL
CO2 SERPL-SCNC: 24 MMOL/L (ref 21–32)
CREAT SERPL-MCNC: 1 MG/DL (ref 0.6–1)
GLOBULIN SER CALC-MCNC: 2.8 G/DL (ref 2.3–3.5)
GLUCOSE SERPL-MCNC: 87 MG/DL (ref 65–100)
HDLC SERPL-MCNC: 48 MG/DL (ref 40–60)
HDLC SERPL: 4.4 {RATIO}
LDLC SERPL CALC-MCNC: 135.2 MG/DL
POTASSIUM SERPL-SCNC: 4.2 MMOL/L (ref 3.5–5.1)
PROT SERPL-MCNC: 6.8 G/DL (ref 6.3–8.2)
SODIUM SERPL-SCNC: 142 MMOL/L (ref 136–145)
TRIGL SERPL-MCNC: 129 MG/DL (ref 35–150)
TSH, 3RD GENERATION: 1.59 UIU/ML (ref 0.36–3.74)
VLDLC SERPL CALC-MCNC: 25.8 MG/DL (ref 6–23)

## 2022-08-02 PROCEDURE — 93294 REM INTERROG EVL PM/LDLS PM: CPT | Performed by: INTERNAL MEDICINE

## 2022-08-02 PROCEDURE — 93296 REM INTERROG EVL PM/IDS: CPT | Performed by: INTERNAL MEDICINE

## 2022-08-04 ENCOUNTER — OFFICE VISIT (OUTPATIENT)
Dept: INTERNAL MEDICINE CLINIC | Facility: CLINIC | Age: 77
End: 2022-08-04
Payer: MEDICARE

## 2022-08-04 VITALS
TEMPERATURE: 97.2 F | SYSTOLIC BLOOD PRESSURE: 112 MMHG | BODY MASS INDEX: 29.03 KG/M2 | WEIGHT: 185 LBS | HEIGHT: 67 IN | OXYGEN SATURATION: 98 % | HEART RATE: 76 BPM | DIASTOLIC BLOOD PRESSURE: 62 MMHG

## 2022-08-04 DIAGNOSIS — Z79.01 LONG TERM (CURRENT) USE OF ANTICOAGULANTS: ICD-10-CM

## 2022-08-04 DIAGNOSIS — Z95.2 HISTORY OF MITRAL VALVE REPLACEMENT WITH MECHANICAL VALVE: ICD-10-CM

## 2022-08-04 DIAGNOSIS — I48.21 PERMANENT ATRIAL FIBRILLATION (HCC): ICD-10-CM

## 2022-08-04 DIAGNOSIS — Z86.79 HX OF BACTERIAL ENDOCARDITIS: ICD-10-CM

## 2022-08-04 DIAGNOSIS — Z95.2 HISTORY OF PROSTHETIC HEART VALVE: ICD-10-CM

## 2022-08-04 DIAGNOSIS — Z12.11 COLON CANCER SCREENING: ICD-10-CM

## 2022-08-04 DIAGNOSIS — K21.9 GASTROESOPHAGEAL REFLUX DISEASE WITHOUT ESOPHAGITIS: ICD-10-CM

## 2022-08-04 DIAGNOSIS — Z00.00 MEDICARE ANNUAL WELLNESS VISIT, SUBSEQUENT: Primary | ICD-10-CM

## 2022-08-04 DIAGNOSIS — E03.9 ACQUIRED HYPOTHYROIDISM: ICD-10-CM

## 2022-08-04 PROCEDURE — 1123F ACP DISCUSS/DSCN MKR DOCD: CPT | Performed by: INTERNAL MEDICINE

## 2022-08-04 PROCEDURE — 3017F COLORECTAL CA SCREEN DOC REV: CPT | Performed by: INTERNAL MEDICINE

## 2022-08-04 PROCEDURE — G0439 PPPS, SUBSEQ VISIT: HCPCS | Performed by: INTERNAL MEDICINE

## 2022-08-04 RX ORDER — OMEPRAZOLE 20 MG/1
20 CAPSULE, DELAYED RELEASE ORAL
Qty: 30 CAPSULE | Refills: 0 | Status: SHIPPED | OUTPATIENT
Start: 2022-08-04 | End: 2022-10-26 | Stop reason: CLARIF

## 2022-08-04 RX ORDER — LEVOTHYROXINE SODIUM 0.05 MG/1
50 TABLET ORAL DAILY
Qty: 90 TABLET | Refills: 3 | Status: SHIPPED | OUTPATIENT
Start: 2022-08-04

## 2022-08-04 ASSESSMENT — PATIENT HEALTH QUESTIONNAIRE - PHQ9
SUM OF ALL RESPONSES TO PHQ QUESTIONS 1-9: 0
SUM OF ALL RESPONSES TO PHQ QUESTIONS 1-9: 0
2. FEELING DOWN, DEPRESSED OR HOPELESS: 0
SUM OF ALL RESPONSES TO PHQ QUESTIONS 1-9: 0
SUM OF ALL RESPONSES TO PHQ QUESTIONS 1-9: 0
SUM OF ALL RESPONSES TO PHQ9 QUESTIONS 1 & 2: 0
1. LITTLE INTEREST OR PLEASURE IN DOING THINGS: 0

## 2022-08-04 ASSESSMENT — LIFESTYLE VARIABLES
HOW MANY STANDARD DRINKS CONTAINING ALCOHOL DO YOU HAVE ON A TYPICAL DAY: PATIENT DOES NOT DRINK
HOW OFTEN DO YOU HAVE A DRINK CONTAINING ALCOHOL: NEVER

## 2022-08-04 NOTE — PROGRESS NOTES
Medicare Annual Wellness Visit    [address] is here for [employer] and [facility]    Assessment & Plan   Medicare annual wellness visit, subsequent  Long term (current) use of anticoagulants  History of mitral valve replacement with mechanical valve  History of prosthetic heart valve  Hx of bacterial endocarditis  Permanent atrial fibrillation (HCC)  Gastroesophageal reflux disease without esophagitis  -     omeprazole (PRILOSEC) 20 MG delayed release capsule; Take 1 capsule by mouth every morning (before breakfast), Disp-30 capsule, R-0Normal  Acquired hypothyroidism  -     levothyroxine (SYNTHROID) 50 MCG tablet; Take 1 tablet by mouth in the morning. TAKE ONE TABLET BY MOUTH ONE TIME DAILY BEFORE BREAKFAST., Disp-90 tablet, R-3Normal    Recommendations for Preventive Services Due: see orders and patient instructions/AVS.  Recommended screening schedule for the next 5-10 years is provided to the patient in written form: see Patient Instructions/AVS.     Return for Medicare Annual Wellness Visit in 1 year. Subjective       Patient's complete Health Risk Assessment and screening values have been reviewed and are found in Flowsheets. The following problems were reviewed today and where indicated follow up appointments were made and/or referrals ordered.     Positive Risk Factor Screenings with Interventions:             General Health and ACP:  General  In general, how would you say your health is?: Good  In the past 7 days, have you experienced any of the following: New or Increased Pain, New or Increased Fatigue, Loneliness, Social Isolation, Stress or Anger?: No  Do you get the social and emotional support that you need?: (!) No  Do you have a Living Will?: Yes    Advance Directives       Power of  Living Will ACP-Advance Directive ACP-Power of     Not on File Not on File Not on File Not on File          General Health Risk Interventions:  .[facility]    Pymetrics Habits/Nutrition:  Physical Activity: Inactive    Days of Exercise per Week: 0 days    Minutes of Exercise per Session: 0 min     Have you lost any weight without trying in the past 3 months?: No  Body mass index: (!) 29.41  Have you seen the dentist within the past year?: Yes  Health Habits/Nutrition Interventions:  A healthy diet to consider is a more mediterranean diet which is abundant in fruits, vegetables, whole grains, legumes and olive oil. It features fish and poultry, lean sources of protein over red meat. The importance of a healthy lifestyle are discussed. Limit high fructose containing foods, moderate portion sizes,  regular cardiovascular exercise (walking, swimming, aerobics) for 30-45 minutes, 3-4 times weekly. Safety:  Do you have working smoke detectors?: Yes  Do you have any tripping hazards - loose or unsecured carpets or rugs?: (!) Yes  Do you have any tripping hazards - clutter in doorways, halls, or stairs?: No  Do you have either shower bars, grab bars, non-slip mats or non-slip surfaces in your shower or bathtub?: (!) No  Do all of your stairways have a railing or banister?: Yes  Do you always fasten your seatbelt when you are in a car?: Yes  Safety Interventions:  Home safety tips provided           Objective   Vitals:    08/04/22 1539   BP: 112/62   Site: Left Upper Arm   Position: Sitting   Cuff Size: Small Adult   Pulse: 76   Temp: 97.2 °F (36.2 °C)   TempSrc: Temporal   SpO2: 98%   Weight: 185 lb (83.9 kg)   Height: 5' 6.5\" (1.689 m)      Body mass index is 29.41 kg/m². Allergies   Allergen Reactions    Penicillins Rash     Prior to Visit Medications    Medication Sig Taking? Authorizing Provider   levothyroxine (SYNTHROID) 50 MCG tablet Take 1 tablet by mouth in the morning. TAKE ONE TABLET BY MOUTH ONE TIME DAILY BEFORE BREAKFAST.  Yes Bridgett Quiles MD   omeprazole (PRILOSEC) 20 MG delayed release capsule Take 1 capsule by mouth every morning (before breakfast) Yes Nafisa Bnada MD   aspirin 81 MG EC tablet Take 81 mg by mouth daily Yes Ar Automatic Reconciliation   Biotin 2.5 MG CAPS Take 2 tablets by mouth 2 times daily Yes Ar Automatic Reconciliation   carvedilol (COREG) 12.5 MG tablet TAKE ONE TABLET BY MOUTH TWICE A DAY Yes Ar Automatic Reconciliation   cefadroxil (DURICEF) 500 MG capsule Take 500 mg by mouth 2 times daily Yes Ar Automatic Reconciliation   vitamin D (CHOLECALCIFEROL) 25 MCG (1000 UT) TABS tablet Take 1,000 Units by mouth daily Yes Ar Automatic Reconciliation   furosemide (LASIX) 20 MG tablet TAKE ONE TABLET BY MOUTH ONE TIME DAILY Yes Ar Automatic Reconciliation   losartan (COZAAR) 25 MG tablet TAKE ONE-HALF TABLET BY MOUTH ONE TIME DAILY Yes Ar Automatic Reconciliation   potassium chloride (KLOR-CON) 10 MEQ extended release tablet Take 10 mEq by mouth daily Yes Ar Automatic Reconciliation   warfarin (COUMADIN) 5 MG tablet TAKE ONE-HALF TO ONE TABLET BY MOUTH ONE TIME DAILY AS DIRECTED Yes Ar Automatic Reconciliation       CareTeam (Including outside providers/suppliers regularly involved in providing care):   Patient Care Team:  Nafisa Banda MD as PCP - Evan Asencio MD as PCP - Kavin Benitez Provider     Reviewed and updated this visit:  Tobacco  Allergies  Meds  Problems  Med Hx  Surg Hx  Soc Hx  Fam Hx

## 2022-08-09 ENCOUNTER — TELEPHONE (OUTPATIENT)
Dept: INTERNAL MEDICINE CLINIC | Facility: CLINIC | Age: 77
End: 2022-08-09

## 2022-08-09 DIAGNOSIS — M72.2 PLANTAR FASCIITIS: Primary | ICD-10-CM

## 2022-08-09 NOTE — TELEPHONE ENCOUNTER
Pt was inquiring about pre sample ointment for heel. Informed pt that we didn't have any. Requesting prescription for ointment for the heel.  Please advise    Thank you    Satish Rees

## 2022-08-15 RX ORDER — LOSARTAN POTASSIUM 25 MG/1
TABLET ORAL
Qty: 45 TABLET | Refills: 3 | OUTPATIENT
Start: 2022-08-15

## 2022-08-15 NOTE — TELEPHONE ENCOUNTER
Orders Placed This Encounter    diclofenac sodium (VOLTAREN) 1 % GEL     Sig: Apply 2 g topically 4 times daily as needed for Pain     Dispense:  50 g     Refill:  Samuel Soto MD

## 2022-08-16 ENCOUNTER — ANTI-COAG VISIT (OUTPATIENT)
Dept: CARDIOLOGY CLINIC | Age: 77
End: 2022-08-16
Payer: MEDICARE

## 2022-08-16 DIAGNOSIS — Z95.3 HISTORY OF AORTIC VALVE REPLACEMENT WITH BIOPROSTHETIC VALVE: ICD-10-CM

## 2022-08-16 DIAGNOSIS — Z95.2 PRESENCE OF PROSTHETIC HEART VALVE: ICD-10-CM

## 2022-08-16 DIAGNOSIS — I48.21 PERMANENT ATRIAL FIBRILLATION (HCC): ICD-10-CM

## 2022-08-16 DIAGNOSIS — I48.20 CHRONIC ATRIAL FIBRILLATION, UNSPECIFIED (HCC): ICD-10-CM

## 2022-08-16 DIAGNOSIS — Z95.2 HISTORY OF MITRAL VALVE REPLACEMENT WITH MECHANICAL VALVE: Primary | ICD-10-CM

## 2022-08-16 DIAGNOSIS — Z79.01 LONG TERM (CURRENT) USE OF ANTICOAGULANTS: ICD-10-CM

## 2022-08-16 LAB
POC INR: 4.4
PROTHROMBIN TIME, POC: ABNORMAL

## 2022-08-16 PROCEDURE — 93793 ANTICOAG MGMT PT WARFARIN: CPT | Performed by: INTERNAL MEDICINE

## 2022-08-16 PROCEDURE — 85610 PROTHROMBIN TIME: CPT | Performed by: INTERNAL MEDICINE

## 2022-08-16 NOTE — PATIENT INSTRUCTIONS
Reminder: Please contact the Coumadin Clinic at [phone] when you have medication changes. Examples, new medications, antibiotics, discontinued medications, new supplements, missed doses of warfarin or if you took extra doses of warfarin. This also includes OTC medications. Notifying us helps reduce the possibility of high and low INR's. In addition, if warfarin needs to be held for any procedures, please have surgeon or physician's office contact us before holding anticoagulant. Thanks, [facility]iology Coumadin Clinic.

## 2022-08-18 RX ORDER — LOSARTAN POTASSIUM 25 MG/1
12.5 TABLET ORAL DAILY
Qty: 45 TABLET | Refills: 3 | Status: SHIPPED | OUTPATIENT
Start: 2022-08-18

## 2022-08-18 NOTE — TELEPHONE ENCOUNTER
MEDICATION REFILL REQUEST      Name of Medication:  Losartan  Dose:  25 mg  Frequency:  1 and 1/2 pill a day  Quantity:  435  Days' supply:  90 day supply with 3 refills      Pharmacy Name/Location:  [phone]

## 2022-08-18 NOTE — TELEPHONE ENCOUNTER
Losartan 25mg 1/2 tab daily verified in last office note, dated 4/22/22.  Medication pended & sent to Dr. Noreen Anand for approval. /pg

## 2022-08-30 ENCOUNTER — ANTI-COAG VISIT (OUTPATIENT)
Dept: CARDIOLOGY CLINIC | Age: 77
End: 2022-08-30
Payer: MEDICARE

## 2022-08-30 DIAGNOSIS — I48.20 CHRONIC ATRIAL FIBRILLATION, UNSPECIFIED (HCC): ICD-10-CM

## 2022-08-30 DIAGNOSIS — Z79.01 LONG TERM (CURRENT) USE OF ANTICOAGULANTS: ICD-10-CM

## 2022-08-30 DIAGNOSIS — Z95.2 PRESENCE OF PROSTHETIC HEART VALVE: ICD-10-CM

## 2022-08-30 DIAGNOSIS — Z95.2 HISTORY OF MITRAL VALVE REPLACEMENT WITH MECHANICAL VALVE: Primary | ICD-10-CM

## 2022-08-30 DIAGNOSIS — Z95.3 HISTORY OF AORTIC VALVE REPLACEMENT WITH BIOPROSTHETIC VALVE: ICD-10-CM

## 2022-08-30 DIAGNOSIS — I48.21 PERMANENT ATRIAL FIBRILLATION (HCC): ICD-10-CM

## 2022-08-30 LAB
POC INR: 2.5
PROTHROMBIN TIME, POC: NORMAL

## 2022-08-30 PROCEDURE — 93793 ANTICOAG MGMT PT WARFARIN: CPT | Performed by: INTERNAL MEDICINE

## 2022-08-30 PROCEDURE — 85610 PROTHROMBIN TIME: CPT | Performed by: INTERNAL MEDICINE

## 2022-08-30 NOTE — PATIENT INSTRUCTIONS
Reminder: Please contact the Coumadin Clinic at [phone] when you have medication changes. Examples, new medications, antibiotics, discontinued medications, new supplements, missed doses of warfarin or if you took extra doses of warfarin. This also includes OTC medications. Notifying us helps reduce the possibility of high and low INR's. In addition, if warfarin needs to be held for any procedures, please have surgeon or physician's office contact us before holding anticoagulant. Thanks, [facility] Coumadin Clinic.

## 2022-09-02 LAB — NONINV COLON CA DNA+OCC BLD SCRN STL QL: NEGATIVE

## 2022-09-03 ENCOUNTER — TELEPHONE (OUTPATIENT)
Dept: INTERNAL MEDICINE CLINIC | Facility: CLINIC | Age: 77
End: 2022-09-03

## 2022-09-03 DIAGNOSIS — I48.21 PERMANENT ATRIAL FIBRILLATION (HCC): Primary | ICD-10-CM

## 2022-09-03 DIAGNOSIS — I48.21 PERMANENT ATRIAL FIBRILLATION (HCC): ICD-10-CM

## 2022-09-03 DIAGNOSIS — U07.1 COVID-19: Primary | ICD-10-CM

## 2022-09-03 RX ORDER — NIRMATRELVIR AND RITONAVIR 300-100 MG
KIT ORAL
Qty: 30 TABLET | Refills: 0 | Status: SHIPPED | OUTPATIENT
Start: 2022-09-03 | End: 2022-09-08

## 2022-09-03 NOTE — PROGRESS NOTES
Upon further reviewing the patient's chart and medical history. The patient's age and comorbidities may make the patient a higher risk for severe COVID. Upon reviewing her current medications it does not appear that any of the medications are contraindicated according to the list that [facility] has published on their website. Also in reviewing her most recent labs her kidney function is normal and her liver function is also normal.  This patient appears to be a good candidate for Paxlovid, and we will prescribe this for her to be completed in 5 days. She is to follow-up with her PCP on Tuesday and let him know that she has completed this treatment. She was again reminded that if she has any progression of her symptoms or any developments of new severe symptoms she is to go to the ER right away. Current Outpatient Medications   Medication Sig Dispense Refill    nirmatrelvir/ritonavir (PAXLOVID, 300/100,) 20 x 150 MG & 10 x 100MG TBPK Take 3 tablets (two 150 mg nirmatrelvir and one 100 mg ritonavir tablets) by mouth every 12 hours for 5 days. 30 tablet 0    losartan (COZAAR) 25 MG tablet Take 0.5 tablets by mouth daily TAKE ONE-HALF TABLET BY MOUTH ONE TIME DAILY 45 tablet 3    diclofenac sodium (VOLTAREN) 1 % GEL Apply 2 g topically 4 times daily as needed for Pain 50 g 0    levothyroxine (SYNTHROID) 50 MCG tablet Take 1 tablet by mouth in the morning. TAKE ONE TABLET BY MOUTH ONE TIME DAILY BEFORE BREAKFAST.  90 tablet 3    omeprazole (PRILOSEC) 20 MG delayed release capsule Take 1 capsule by mouth every morning (before breakfast) 30 capsule 0    aspirin 81 MG EC tablet Take 81 mg by mouth daily      Biotin 2.5 MG CAPS Take 2 tablets by mouth 2 times daily      carvedilol (COREG) 12.5 MG tablet TAKE ONE TABLET BY MOUTH TWICE A DAY      cefadroxil (DURICEF) 500 MG capsule Take 500 mg by mouth 2 times daily      vitamin D (CHOLECALCIFEROL) 25 MCG (1000 UT) TABS tablet Take 1,000 Units by mouth daily furosemide (LASIX) 20 MG tablet TAKE ONE TABLET BY MOUTH ONE TIME DAILY      potassium chloride (KLOR-CON) 10 MEQ extended release tablet Take 10 mEq by mouth daily      warfarin (COUMADIN) 5 MG tablet TAKE ONE-HALF TO ONE TABLET BY MOUTH ONE TIME DAILY AS DIRECTED       No current facility-administered medications for this visit. We reviewed the possible side effects including allergic reactions (hives, difficulty breathing or swallowing, swelling of the lips, etc.), altered sense of taste, diarrhea, nausea, vomiting, high blood pressure, muscle aches/pains, abdominal pain, and a general feeling of unwell. The patient understands the risks and agrees with this treatment plan. 5-day prescription of Paxlovid sent to [facility] at [facility]. Sarah Valverde D.O.   Internal Medicine   [facility]

## 2022-09-03 NOTE — TELEPHONE ENCOUNTER
Spoke with the patient on the phone who tested positive for COVID-19 today. She has been having very mild symptoms since yesterday. She states that she had seen her doctor previously during the week and he was talking about a medication he was giving to patient's with COVID. I presume he was talking about Paxlovid, the patient was wondering about if she could get this medication, I explained to the patient that because she is on multiple medications and Paxlovid has interactions with a bunch of different medications that I would be more comfortable if her PCP who knows her medical history and medications well, prescribed it for her on Tuesday if he deems it necessary. As of right now her symptoms are mild, she has not had a fever and is only complaining of a moderate sore throat. If the patient has any progression of symptoms or severe fever, cough, or shortness of breath with decreased oxygen levels she is instructed to go to to the ER. I advised her to monitor her vital signs including temperature and oxygen levels over the weekend. The patient will follow up with her PCP on Tuesday morning to discuss this. Jackson Laws D.O.   Internal Medicine   [facility]

## 2022-09-13 ENCOUNTER — ANTI-COAG VISIT (OUTPATIENT)
Dept: CARDIOLOGY CLINIC | Age: 77
End: 2022-09-13
Payer: MEDICARE

## 2022-09-13 DIAGNOSIS — Z95.2 PRESENCE OF PROSTHETIC HEART VALVE: ICD-10-CM

## 2022-09-13 DIAGNOSIS — I48.21 PERMANENT ATRIAL FIBRILLATION (HCC): ICD-10-CM

## 2022-09-13 DIAGNOSIS — I48.20 CHRONIC ATRIAL FIBRILLATION, UNSPECIFIED (HCC): ICD-10-CM

## 2022-09-13 DIAGNOSIS — Z79.01 LONG TERM (CURRENT) USE OF ANTICOAGULANTS: ICD-10-CM

## 2022-09-13 DIAGNOSIS — Z95.2 HISTORY OF MITRAL VALVE REPLACEMENT WITH MECHANICAL VALVE: Primary | ICD-10-CM

## 2022-09-13 DIAGNOSIS — Z95.3 HISTORY OF AORTIC VALVE REPLACEMENT WITH BIOPROSTHETIC VALVE: ICD-10-CM

## 2022-09-13 LAB
POC INR: 1.9
PROTHROMBIN TIME, POC: ABNORMAL

## 2022-09-13 PROCEDURE — 93793 ANTICOAG MGMT PT WARFARIN: CPT | Performed by: INTERNAL MEDICINE

## 2022-09-13 PROCEDURE — 85610 PROTHROMBIN TIME: CPT | Performed by: INTERNAL MEDICINE

## 2022-09-13 NOTE — PROGRESS NOTES
Tablet strength and weekly dosing schedule confirmed today. Pt was on paxlovid for Covid, completed course. One-time dose increase and recheck in one week.

## 2022-09-20 ENCOUNTER — ANTI-COAG VISIT (OUTPATIENT)
Dept: CARDIOLOGY CLINIC | Age: 77
End: 2022-09-20
Payer: MEDICARE

## 2022-09-20 DIAGNOSIS — Z79.01 LONG TERM (CURRENT) USE OF ANTICOAGULANTS: ICD-10-CM

## 2022-09-20 DIAGNOSIS — Z95.2 PRESENCE OF PROSTHETIC HEART VALVE: ICD-10-CM

## 2022-09-20 DIAGNOSIS — Z95.3 HISTORY OF AORTIC VALVE REPLACEMENT WITH BIOPROSTHETIC VALVE: ICD-10-CM

## 2022-09-20 DIAGNOSIS — I48.20 CHRONIC ATRIAL FIBRILLATION, UNSPECIFIED (HCC): ICD-10-CM

## 2022-09-20 DIAGNOSIS — Z95.2 HISTORY OF MITRAL VALVE REPLACEMENT WITH MECHANICAL VALVE: Primary | ICD-10-CM

## 2022-09-20 DIAGNOSIS — I48.21 PERMANENT ATRIAL FIBRILLATION (HCC): ICD-10-CM

## 2022-09-20 LAB
POC INR: 2.9
PROTHROMBIN TIME, POC: NORMAL

## 2022-09-20 PROCEDURE — 93793 ANTICOAG MGMT PT WARFARIN: CPT | Performed by: INTERNAL MEDICINE

## 2022-09-20 PROCEDURE — 85610 PROTHROMBIN TIME: CPT | Performed by: INTERNAL MEDICINE

## 2022-09-20 NOTE — PATIENT INSTRUCTIONS
Reminder: Please contact the Coumadin Clinic at [phone] when you have medication changes. Examples, new medications, antibiotics, discontinued medications, new supplements, missed doses of warfarin or if you took extra doses of warfarin. This also includes OTC medications. Notifying us helps reduce the possibility of high and low INR's. In addition, if warfarin needs to be held for any procedures, please have surgeon or physician's office contact us before holding anticoagulant. Thanks, [facility] Coumadin Clinic.

## 2022-09-26 RX ORDER — FUROSEMIDE 20 MG/1
TABLET ORAL
Qty: 90 TABLET | Refills: 3 | OUTPATIENT
Start: 2022-09-26

## 2022-09-26 RX ORDER — FUROSEMIDE 20 MG/1
20 TABLET ORAL DAILY
Qty: 90 TABLET | Refills: 3 | Status: SHIPPED | OUTPATIENT
Start: 2022-09-26

## 2022-09-26 NOTE — TELEPHONE ENCOUNTER
MEDICATION REFILL REQUEST      Name of Medication:  Furosemide   Dose:  20 mg  Frequency:    Quantity:    Days' supply:  90       Pharmacy Name/Location:  [address]

## 2022-09-27 RX ORDER — LOSARTAN POTASSIUM 25 MG/1
TABLET ORAL
Qty: 45 TABLET | Refills: 3 | OUTPATIENT
Start: 2022-09-27

## 2022-09-27 RX ORDER — FUROSEMIDE 20 MG/1
TABLET ORAL
Qty: 90 TABLET | Refills: 3 | OUTPATIENT
Start: 2022-09-27

## 2022-10-18 ENCOUNTER — ANTI-COAG VISIT (OUTPATIENT)
Dept: CARDIOLOGY CLINIC | Age: 77
End: 2022-10-18
Payer: MEDICARE

## 2022-10-18 DIAGNOSIS — I48.20 CHRONIC ATRIAL FIBRILLATION, UNSPECIFIED (HCC): ICD-10-CM

## 2022-10-18 DIAGNOSIS — Z95.2 HISTORY OF MITRAL VALVE REPLACEMENT WITH MECHANICAL VALVE: Primary | ICD-10-CM

## 2022-10-18 DIAGNOSIS — Z79.01 LONG TERM (CURRENT) USE OF ANTICOAGULANTS: ICD-10-CM

## 2022-10-18 DIAGNOSIS — Z95.2 PRESENCE OF PROSTHETIC HEART VALVE: ICD-10-CM

## 2022-10-18 DIAGNOSIS — Z95.3 HISTORY OF AORTIC VALVE REPLACEMENT WITH BIOPROSTHETIC VALVE: ICD-10-CM

## 2022-10-18 DIAGNOSIS — I48.21 PERMANENT ATRIAL FIBRILLATION (HCC): ICD-10-CM

## 2022-10-18 LAB
POC INR: 2.1
PROTHROMBIN TIME, POC: ABNORMAL

## 2022-10-18 PROCEDURE — 93793 ANTICOAG MGMT PT WARFARIN: CPT | Performed by: INTERNAL MEDICINE

## 2022-10-18 PROCEDURE — 85610 PROTHROMBIN TIME: CPT | Performed by: INTERNAL MEDICINE

## 2022-10-21 RX ORDER — LOSARTAN POTASSIUM 25 MG/1
TABLET ORAL
Qty: 45 TABLET | Refills: 3 | OUTPATIENT
Start: 2022-10-21

## 2022-10-23 ASSESSMENT — ENCOUNTER SYMPTOMS
PHOTOPHOBIA: 0
CONSTIPATION: 0
ABDOMINAL DISTENTION: 0
SHORTNESS OF BREATH: 0
COUGH: 0
ABDOMINAL PAIN: 0
SORE THROAT: 0
DIARRHEA: 0

## 2022-10-23 NOTE — PROGRESS NOTES
[facility] CARDIOLOGY  [address], [address]  PHONE: [phone]        NAME:  Moises Davila  : 1945  MRN: [id number]     PCP:  Lawrence Mcardle, MD      SUBJECTIVE:   Moises Davila is a 77 y.o. female seen for a follow up visit regarding the following:     Chief Complaint   Patient presents with    Irregular Heart Beat       HPI:          Doing well since last visit without interval angina, CHF, palpitations, edema, presyncope or syncope. Vitals controlled and tolerating meds well. She has chronic mild severity dyspnea on exertion but no symptoms whatsoever on flat ground. No limitations of activities of daily living. Walking her dog daily with no limitations other than mild dyspnea with hills. Staying active otherwise without any significant limitations other than joint pain. She has been on chronic oral suppressive therapy for endocarditis with two valve replacements and tolerating it very well. She denies fever, chills, or malaise since our last visit. She has been vaccinated for Covid. She has mild LE dependent edema but it responds well to PRN BID lasix for a few days. She denies any CHF symptoms. [facility] is satisfied that there is no occult worsening of potential endocarditis/bacteremia and she is following up with them only PRN now. She is very happy with current results of chronic ABX suppressive therapy and doing well. An echo in May 2014 suggested possible small vegetation of pacer lead, but surveillance repeat echo in 2015 showed no vegetation present. She is back to baseline activities without any significant limitations at present. AF is permanent now but she is anticoagulated lifelong and clinically asymptomatic. She needs surveillance echo yearly for now with mildly elevated but no change in aortic and mitral prosthetic gradients on 2021 and 2022 echos. .. Echo 4/12/2022:     Left Ventricle  Left ventricle size is normal. Mildly increased wall thickness. Normal wall motion. Low normal left ventricular systolic function with a visually  estimated EF of 50 - 55%. Indeterminate diastolic function. Left Atrium  Left atrium is severely dilated. LA Vol Index is  51 ml/m2. Right Ventricle  Right ventricle size is normal. Normal wall thickness. RV basal diameter is 3.9 cm. RV mid diameter is 3.9 cm. Normal systolic function. Right Atrium  Right atrium size is normal.     Aortic Valve  Mildly calcified cusp. Mild stenosis of the aortic valve. AV mean gradient is 13 mmHg. AV peak gradient is 23 mmHg. LVOT:AV VTI Index is 0.34. LVOT  diameter is 2.0 cm. AV area by continuity VTI is 1.1 cm2. AV Stroke Volume index is 29.9 mL/m2. Mitral Valve  Not well visualized. No transvalvular regurgitation. MV mean gradient is 8 mmHg. MV PEAK-28  DI-2.33  MVA-1.32     Tricuspid Valve  Not well visualized. Mild transvalvular regurgitation. RVSP is 33 mmHg. TV mean velocity is 1.1 m/s. TV mean gradient is 5 mmHg. TV peak gradient  is 13 mmHg. History of TV Ring     Pulmonic Valve  The pulmonic valve was not well visualized. Aorta  Not well visualized. Pericardium  No pericardial effusion. .   Past Medical History, Past Surgical History, Family history, Social History, and Medications were all reviewed with the patient today and updated as necessary. Current Outpatient Medications   Medication Sig Dispense Refill    furosemide (LASIX) 20 MG tablet Take 1 tablet by mouth daily TAKE ONE TABLET BY MOUTH ONE TIME DAILY 90 tablet 3    losartan (COZAAR) 25 MG tablet Take 0.5 tablets by mouth daily TAKE ONE-HALF TABLET BY MOUTH ONE TIME DAILY 45 tablet 3    diclofenac sodium (VOLTAREN) 1 % GEL Apply 2 g topically 4 times daily as needed for Pain 50 g 0    levothyroxine (SYNTHROID) 50 MCG tablet Take 1 tablet by mouth in the morning.  TAKE ONE TABLET BY MOUTH ONE TIME DAILY BEFORE BREAKFAST. 90 tablet 3    aspirin 81 MG EC tablet Take 81 mg by mouth daily      Biotin 2.5 MG CAPS Take 2 tablets by mouth 2 times daily      carvedilol (COREG) 12.5 MG tablet TAKE ONE TABLET BY MOUTH TWICE A DAY      cefadroxil (DURICEF) 500 MG capsule Take 500 mg by mouth 2 times daily      vitamin D (CHOLECALCIFEROL) 25 MCG (1000 UT) TABS tablet Take 1,000 Units by mouth daily      potassium chloride (KLOR-CON) 10 MEQ extended release tablet Take 10 mEq by mouth daily      warfarin (COUMADIN) 5 MG tablet TAKE ONE-HALF TO ONE TABLET BY MOUTH ONE TIME DAILY AS DIRECTED       No current facility-administered medications for this visit.             Allergies   Allergen Reactions    Penicillins Rash       Patient Active Problem List    Diagnosis Date Noted    Permanent atrial fibrillation ([facility] [address] 10/03/2019    Long term (current) use of anticoagulants 07/18/2018    History of mitral valve replacement with mechanical valve      Overview Note:     mechanical mitral valve  [facility] [place]        Cardiac pacemaker 07/15/2016    History of aortic valve replacement with bioprosthetic valve 07/15/2016    History of prosthetic heart valve      Overview Note:     Bovine aortic valve  2009 at [facility]         Endocarditis     Tricuspid valve disorder      Overview Note:     S/p annuloplasty ring by patient report        Chronic systolic heart failure ([facility] [address]      Overview Note:     Resolved by  Last echo, euvolemic        Chronic renal failure     Hypertension     Chronic a-fib ([facility] [address] 02/17/2016    Pacemaker     Hx of bacterial endocarditis     Dyslipidemia         Past Surgical History:   Procedure Laterality Date    AORTIC VALVE REPLACEMENT  2009    [facility]    MITRAL VALVE REPLACEMENT  2001    [facility]    OTHER SURGICAL HISTORY      s/p annuloplasty ring by pt report    PACEMAKER      [place] CARDIAC SURG PROCEDURE UNLIST      TONSILLECTOMY         Family History   Problem Relation Age of Onset    Cancer Mother         Cervical    Breast Cancer Neg Hx     Heart Failure Mother         Social History     Tobacco Use    Smoking status: Never    Smokeless tobacco: Never   Substance Use Topics    Alcohol use: No       ROS:    Review of Systems   Constitutional:  Negative for appetite change, chills, diaphoresis and fatigue. HENT:  Negative for congestion, mouth sores, nosebleeds, sore throat and tinnitus. Eyes:  Negative for photophobia and visual disturbance. Respiratory:  Negative for cough and shortness of breath. Cardiovascular:  Negative for chest pain, palpitations and leg swelling. Gastrointestinal:  Negative for abdominal distention, abdominal pain, constipation and diarrhea. Endocrine: Negative for cold intolerance, heat intolerance, polydipsia and polyuria. Genitourinary:  Negative for dysuria and hematuria. Musculoskeletal:  Negative for arthralgias, joint swelling and myalgias. Skin:  Negative for rash. Allergic/Immunologic: Negative for environmental allergies and food allergies. Neurological:  Negative for dizziness, seizures, syncope and light-headedness. Hematological:  Negative for adenopathy. Does not bruise/bleed easily. Psychiatric/Behavioral:  Negative for agitation, behavioral problems, dysphoric mood and hallucinations. The patient is not nervous/anxious. PHYSICAL EXAM:     Vitals:    10/26/22 0959   BP: 108/68   Pulse: 75   Weight: 184 lb (83.5 kg)   Height: 5' 6\" (1.676 m)      Wt Readings from Last 3 Encounters:   10/26/22 184 lb (83.5 kg)   08/04/22 185 lb (83.9 kg)   04/22/22 187 lb 9.6 oz (85.1 kg)      BP Readings from Last 3 Encounters:   10/26/22 108/68   08/04/22 112/62   04/22/22 110/62        Physical Exam  Constitutional:       Appearance: Normal appearance. She is normal weight. HENT:      Head: Normocephalic and atraumatic.       Nose: Nose normal.      Mouth/Throat:      Mouth: Mucous membranes are moist.      Pharynx: Oropharynx is clear.   Eyes:      Extraocular Movements: Extraocular movements intact. Pupils: Pupils are equal, round, and reactive to light. Neck:      Vascular: No carotid bruit or JVD. Cardiovascular:      Rate and Rhythm: Normal rate and regular rhythm. Heart sounds: No murmur (2/6 TERI RUSB, crisp MVR mech click) heard. No friction rub. No gallop. Pulmonary:      Effort: Pulmonary effort is normal.      Breath sounds: Normal breath sounds. No wheezing or rhonchi. Abdominal:      General: Abdomen is flat. Bowel sounds are normal. There is no distension. Palpations: Abdomen is soft. Tenderness: There is no abdominal tenderness. Musculoskeletal:         General: No swelling. Normal range of motion. Cervical back: Normal range of motion and neck supple. No tenderness. Skin:     General: Skin is warm and dry. Neurological:      General: No focal deficit present. Mental Status: She is alert and oriented to person, place, and time. Mental status is at baseline. Psychiatric:         Mood and Affect: Mood normal.         Behavior: Behavior normal.        Medical problems and test results were reviewed with the patient today.        Lab Results   Component Value Date    CHOL 209 (H) 07/28/2022     Lab Results   Component Value Date    TRIG 129 07/28/2022     Lab Results   Component Value Date    HDL 48 07/28/2022     Lab Results   Component Value Date    LDLCALC 135.2 (H) 07/28/2022     Lab Results   Component Value Date    LABVLDL 25.8 (H) 07/28/2022     Lab Results   Component Value Date    CHOLHDLRATIO 4.4 07/28/2022        Lab Results   Component Value Date/Time     07/28/2022 01:59 PM    K 4.2 07/28/2022 01:59 PM     07/28/2022 01:59 PM    CO2 24 07/28/2022 01:59 PM    BUN 25 07/28/2022 01:59 PM    CREATININE 1.00 07/28/2022 01:59 PM    GLUCOSE 87 07/28/2022 01:59 PM    CALCIUM 8.9 07/28/2022 01:59 PM         No results for input(s): WBC, HGB, HCT, MCV, PLT in the last 720 hours. No results found for: LABA1C  No results found for: EAG     No results found for: BNP     Lab Results   Component Value Date    TSH 1.920 09/26/2019        Results for orders placed or performed in visit on 10/26/22   EKG 12 lead    Impression    A. fib with V pacing 75 bpm  Diffuse nonspecific ST-T wave changes   Results for orders placed or performed in visit on 10/26/22   AMB POC PT/INR   Result Value Ref Range    Prothrombin time, POC      POC INR 2.8         ASSESSMENT and PLAN     Diagnoses and all orders for this visit:      Permanent atrial fibrillation (HCC) - rate controlled, lifelong coumadin as tolerated- no recent bleeding      Chronic systolic heart failure ([address] - resolved, EF 49% on echo - euvolemic and asymptomatic at present. Recheck echo in 6 months in our office to reassess valve gradients. Chronic bacterial endocarditis - stable, controlled, lifelong suppressive therapy per ID recs      Mechanical mitral valve present - stable, coumadin and ABX lifelong      History of aortic valve replacement with bioprosthetic valve - stable, doing well      Essential hypertension with goal blood pressure less than 130/80 - excellent here and at home      Cardiac pacemaker - s/p generator change-  we converted to a [employer] generator so that her leads and generator are MRI compatible now. Dyslipidemia- stable, continue meds and lifestyle/diet/exercise. Return in about 6 months (around 4/26/2023).          Soha Pettit MD  10/26/2022  10:09 AM

## 2022-10-26 ENCOUNTER — OFFICE VISIT (OUTPATIENT)
Dept: CARDIOLOGY CLINIC | Age: 77
End: 2022-10-26
Payer: MEDICARE

## 2022-10-26 ENCOUNTER — ANTI-COAG VISIT (OUTPATIENT)
Dept: CARDIOLOGY CLINIC | Age: 77
End: 2022-10-26
Payer: MEDICARE

## 2022-10-26 VITALS
DIASTOLIC BLOOD PRESSURE: 68 MMHG | SYSTOLIC BLOOD PRESSURE: 108 MMHG | WEIGHT: 184 LBS | HEART RATE: 75 BPM | HEIGHT: 66 IN | BODY MASS INDEX: 29.57 KG/M2

## 2022-10-26 DIAGNOSIS — Z79.01 LONG TERM (CURRENT) USE OF ANTICOAGULANTS: ICD-10-CM

## 2022-10-26 DIAGNOSIS — Z95.0 CARDIAC PACEMAKER: ICD-10-CM

## 2022-10-26 DIAGNOSIS — Z95.0 PACEMAKER: ICD-10-CM

## 2022-10-26 DIAGNOSIS — E78.5 DYSLIPIDEMIA: ICD-10-CM

## 2022-10-26 DIAGNOSIS — I50.22 CHRONIC SYSTOLIC HEART FAILURE (HCC): Primary | ICD-10-CM

## 2022-10-26 DIAGNOSIS — I48.20 CHRONIC ATRIAL FIBRILLATION, UNSPECIFIED (HCC): ICD-10-CM

## 2022-10-26 DIAGNOSIS — Z95.3 HISTORY OF AORTIC VALVE REPLACEMENT WITH BIOPROSTHETIC VALVE: ICD-10-CM

## 2022-10-26 DIAGNOSIS — I10 PRIMARY HYPERTENSION: ICD-10-CM

## 2022-10-26 DIAGNOSIS — Z86.79 HX OF BACTERIAL ENDOCARDITIS: ICD-10-CM

## 2022-10-26 DIAGNOSIS — I48.21 PERMANENT ATRIAL FIBRILLATION (HCC): ICD-10-CM

## 2022-10-26 DIAGNOSIS — Z95.2 PRESENCE OF PROSTHETIC HEART VALVE: ICD-10-CM

## 2022-10-26 DIAGNOSIS — Z95.2 HISTORY OF MITRAL VALVE REPLACEMENT WITH MECHANICAL VALVE: Primary | ICD-10-CM

## 2022-10-26 DIAGNOSIS — Z95.2 HISTORY OF MITRAL VALVE REPLACEMENT WITH MECHANICAL VALVE: ICD-10-CM

## 2022-10-26 LAB
POC INR: 2.8
PROTHROMBIN TIME, POC: NORMAL

## 2022-10-26 PROCEDURE — 3017F COLORECTAL CA SCREEN DOC REV: CPT | Performed by: INTERNAL MEDICINE

## 2022-10-26 PROCEDURE — 99214 OFFICE O/P EST MOD 30 MIN: CPT | Performed by: INTERNAL MEDICINE

## 2022-10-26 PROCEDURE — 3074F SYST BP LT 130 MM HG: CPT | Performed by: INTERNAL MEDICINE

## 2022-10-26 PROCEDURE — 85610 PROTHROMBIN TIME: CPT | Performed by: INTERNAL MEDICINE

## 2022-10-26 PROCEDURE — G8484 FLU IMMUNIZE NO ADMIN: HCPCS | Performed by: INTERNAL MEDICINE

## 2022-10-26 PROCEDURE — G8427 DOCREV CUR MEDS BY ELIG CLIN: HCPCS | Performed by: INTERNAL MEDICINE

## 2022-10-26 PROCEDURE — 93000 ELECTROCARDIOGRAM COMPLETE: CPT | Performed by: INTERNAL MEDICINE

## 2022-10-26 PROCEDURE — 3078F DIAST BP <80 MM HG: CPT | Performed by: INTERNAL MEDICINE

## 2022-10-26 PROCEDURE — 1123F ACP DISCUSS/DSCN MKR DOCD: CPT | Performed by: INTERNAL MEDICINE

## 2022-10-26 PROCEDURE — 1090F PRES/ABSN URINE INCON ASSESS: CPT | Performed by: INTERNAL MEDICINE

## 2022-10-26 PROCEDURE — G8417 CALC BMI ABV UP PARAM F/U: HCPCS | Performed by: INTERNAL MEDICINE

## 2022-10-26 PROCEDURE — 1036F TOBACCO NON-USER: CPT | Performed by: INTERNAL MEDICINE

## 2022-10-26 PROCEDURE — G8400 PT W/DXA NO RESULTS DOC: HCPCS | Performed by: INTERNAL MEDICINE

## 2022-10-26 NOTE — PATIENT INSTRUCTIONS
Reminder: Please contact the [facility] at [phone] when you have medication changes. Examples, new medications, antibiotics, discontinued medications, new supplements, missed doses of warfarin or if you took extra doses of warfarin. This also includes OTC medications. Notifying us helps reduce the possibility of high and low INR's. In addition, if warfarin needs to be held for any procedures, please have surgeon or physician's office contact us before holding anticoagulant. Thanks, [facility].

## 2022-11-03 PROCEDURE — 93296 REM INTERROG EVL PM/IDS: CPT | Performed by: INTERNAL MEDICINE

## 2022-11-03 PROCEDURE — 93294 REM INTERROG EVL PM/LDLS PM: CPT | Performed by: INTERNAL MEDICINE

## 2022-11-04 RX ORDER — POTASSIUM CHLORIDE 750 MG/1
10 TABLET, FILM COATED, EXTENDED RELEASE ORAL DAILY
Qty: 90 TABLET | Refills: 3 | Status: SHIPPED | OUTPATIENT
Start: 2022-11-04

## 2022-11-04 NOTE — TELEPHONE ENCOUNTER
MEDICATION REFILL REQUEST      Name of Medication:  Potassium chloride  Dose:  10mg  Frequency:  once a day  Quantity:  90  Days' supply:  3 month      Pharmacy Name/Location: [facility] on [address] [phone]        Please call when sent over.

## 2022-11-06 ENCOUNTER — ANESTHESIA EVENT (OUTPATIENT)
Dept: ENDOSCOPY | Age: 77
DRG: 377 | End: 2022-11-06
Payer: MEDICARE

## 2022-11-06 ENCOUNTER — HOSPITAL ENCOUNTER (INPATIENT)
Age: 77
LOS: 5 days | Discharge: HOME | DRG: 377 | End: 2022-11-11
Attending: STUDENT IN AN ORGANIZED HEALTH CARE EDUCATION/TRAINING PROGRAM | Admitting: FAMILY MEDICINE
Payer: MEDICARE

## 2022-11-06 ENCOUNTER — ANESTHESIA (OUTPATIENT)
Dept: ENDOSCOPY | Age: 77
DRG: 377 | End: 2022-11-06
Payer: MEDICARE

## 2022-11-06 DIAGNOSIS — K92.2 UGIB (UPPER GASTROINTESTINAL BLEED): Primary | ICD-10-CM

## 2022-11-06 DIAGNOSIS — I50.33 ACUTE ON CHRONIC DIASTOLIC HEART FAILURE (HCC): ICD-10-CM

## 2022-11-06 PROBLEM — Z79.01 WARFARIN ANTICOAGULATION: Status: ACTIVE | Noted: 2022-11-06

## 2022-11-06 PROBLEM — I48.21 PERMANENT ATRIAL FIBRILLATION (HCC): Status: ACTIVE | Noted: 2019-10-03

## 2022-11-06 PROBLEM — D68.69 HYPERCOAGULABLE STATE, SECONDARY (HCC): Chronic | Status: ACTIVE | Noted: 2022-11-06

## 2022-11-06 PROBLEM — D62 ABLA (ACUTE BLOOD LOSS ANEMIA): Status: ACTIVE | Noted: 2022-11-06

## 2022-11-06 LAB
ABO + RH BLD: NORMAL
ALBUMIN SERPL-MCNC: 3.6 G/DL (ref 3.2–4.6)
ALBUMIN/GLOB SERPL: 1.2 {RATIO} (ref 0.4–1.6)
ALP SERPL-CCNC: 76 U/L (ref 50–136)
ALT SERPL-CCNC: 17 U/L (ref 12–65)
ANION GAP SERPL CALC-SCNC: 1 MMOL/L (ref 2–11)
AST SERPL-CCNC: 15 U/L (ref 15–37)
BASOPHILS # BLD: 0 K/UL (ref 0–0.2)
BASOPHILS NFR BLD: 0 % (ref 0–2)
BILIRUB SERPL-MCNC: 0.5 MG/DL (ref 0.2–1.1)
BLOOD GROUP ANTIBODIES SERPL: NORMAL
BUN SERPL-MCNC: 42 MG/DL (ref 8–23)
CALCIUM SERPL-MCNC: 9 MG/DL (ref 8.3–10.4)
CHLORIDE SERPL-SCNC: 112 MMOL/L (ref 101–110)
CO2 SERPL-SCNC: 27 MMOL/L (ref 21–32)
CREAT SERPL-MCNC: 0.9 MG/DL (ref 0.6–1)
DIFFERENTIAL METHOD BLD: ABNORMAL
EOSINOPHIL # BLD: 0 K/UL (ref 0–0.8)
EOSINOPHIL NFR BLD: 1 % (ref 0.5–7.8)
ERYTHROCYTE [DISTWIDTH] IN BLOOD BY AUTOMATED COUNT: 12.8 % (ref 11.9–14.6)
GLOBULIN SER CALC-MCNC: 3 G/DL (ref 2.8–4.5)
GLUCOSE SERPL-MCNC: 121 MG/DL (ref 65–100)
HCT VFR BLD AUTO: 28.3 % (ref 35.8–46.3)
HGB BLD-MCNC: 9.2 G/DL (ref 11.7–15.4)
IMM GRANULOCYTES # BLD AUTO: 0 K/UL (ref 0–0.5)
IMM GRANULOCYTES NFR BLD AUTO: 0 % (ref 0–5)
INR PPP: 2.5
LYMPHOCYTES # BLD: 0.9 K/UL (ref 0.5–4.6)
LYMPHOCYTES NFR BLD: 10 % (ref 13–44)
MCH RBC QN AUTO: 30.7 PG (ref 26.1–32.9)
MCHC RBC AUTO-ENTMCNC: 32.5 G/DL (ref 31.4–35)
MCV RBC AUTO: 94.3 FL (ref 82–102)
MONOCYTES # BLD: 0.5 K/UL (ref 0.1–1.3)
MONOCYTES NFR BLD: 5 % (ref 4–12)
NEUTS SEG # BLD: 7.5 K/UL (ref 1.7–8.2)
NEUTS SEG NFR BLD: 84 % (ref 43–78)
NRBC # BLD: 0 K/UL (ref 0–0.2)
PLATELET # BLD AUTO: 168 K/UL (ref 150–450)
PMV BLD AUTO: 10.8 FL (ref 9.4–12.3)
POTASSIUM SERPL-SCNC: 4.4 MMOL/L (ref 3.5–5.1)
PROT SERPL-MCNC: 6.6 G/DL (ref 6.3–8.2)
PROTHROMBIN TIME: 27.3 SEC (ref 12.6–14.3)
RBC # BLD AUTO: 3 M/UL (ref 4.05–5.2)
SODIUM SERPL-SCNC: 140 MMOL/L (ref 133–143)
SPECIMEN EXP DATE BLD: NORMAL
WBC # BLD AUTO: 8.9 K/UL (ref 4.3–11.1)

## 2022-11-06 PROCEDURE — 93005 ELECTROCARDIOGRAM TRACING: CPT | Performed by: STUDENT IN AN ORGANIZED HEALTH CARE EDUCATION/TRAINING PROGRAM

## 2022-11-06 PROCEDURE — 86901 BLOOD TYPING SEROLOGIC RH(D): CPT

## 2022-11-06 PROCEDURE — 80053 COMPREHEN METABOLIC PANEL: CPT

## 2022-11-06 PROCEDURE — 2709999900 HC NON-CHARGEABLE SUPPLY: Performed by: INTERNAL MEDICINE

## 2022-11-06 PROCEDURE — 7100000001 HC PACU RECOVERY - ADDTL 15 MIN: Performed by: INTERNAL MEDICINE

## 2022-11-06 PROCEDURE — 3609013000 HC EGD TRANSORAL CONTROL BLEEDING ANY METHOD: Performed by: INTERNAL MEDICINE

## 2022-11-06 PROCEDURE — 1100000000 HC RM PRIVATE

## 2022-11-06 PROCEDURE — C9113 INJ PANTOPRAZOLE SODIUM, VIA: HCPCS | Performed by: STUDENT IN AN ORGANIZED HEALTH CARE EDUCATION/TRAINING PROGRAM

## 2022-11-06 PROCEDURE — 2580000003 HC RX 258: Performed by: STUDENT IN AN ORGANIZED HEALTH CARE EDUCATION/TRAINING PROGRAM

## 2022-11-06 PROCEDURE — 96374 THER/PROPH/DIAG INJ IV PUSH: CPT

## 2022-11-06 PROCEDURE — 85025 COMPLETE CBC W/AUTO DIFF WBC: CPT

## 2022-11-06 PROCEDURE — 85610 PROTHROMBIN TIME: CPT

## 2022-11-06 PROCEDURE — 6360000002 HC RX W HCPCS: Performed by: ANESTHESIOLOGY

## 2022-11-06 PROCEDURE — 2720000010 HC SURG SUPPLY STERILE: Performed by: INTERNAL MEDICINE

## 2022-11-06 PROCEDURE — 2500000003 HC RX 250 WO HCPCS: Performed by: ANESTHESIOLOGY

## 2022-11-06 PROCEDURE — 6360000002 HC RX W HCPCS: Performed by: STUDENT IN AN ORGANIZED HEALTH CARE EDUCATION/TRAINING PROGRAM

## 2022-11-06 PROCEDURE — 99285 EMERGENCY DEPT VISIT HI MDM: CPT

## 2022-11-06 PROCEDURE — 3700000000 HC ANESTHESIA ATTENDED CARE: Performed by: INTERNAL MEDICINE

## 2022-11-06 PROCEDURE — 3700000001 HC ADD 15 MINUTES (ANESTHESIA): Performed by: INTERNAL MEDICINE

## 2022-11-06 PROCEDURE — 0W3P8ZZ CONTROL BLEEDING IN GASTROINTESTINAL TRACT, VIA NATURAL OR ARTIFICIAL OPENING ENDOSCOPIC: ICD-10-PCS | Performed by: INTERNAL MEDICINE

## 2022-11-06 PROCEDURE — A4216 STERILE WATER/SALINE, 10 ML: HCPCS | Performed by: STUDENT IN AN ORGANIZED HEALTH CARE EDUCATION/TRAINING PROGRAM

## 2022-11-06 PROCEDURE — 7100000000 HC PACU RECOVERY - FIRST 15 MIN: Performed by: INTERNAL MEDICINE

## 2022-11-06 RX ORDER — ACETAMINOPHEN 325 MG/1
650 TABLET ORAL EVERY 6 HOURS PRN
Status: DISCONTINUED | OUTPATIENT
Start: 2022-11-06 | End: 2022-11-11 | Stop reason: HOSPADM

## 2022-11-06 RX ORDER — HYDROMORPHONE HYDROCHLORIDE 2 MG/ML
0.5 INJECTION, SOLUTION INTRAMUSCULAR; INTRAVENOUS; SUBCUTANEOUS EVERY 5 MIN PRN
Status: DISCONTINUED | OUTPATIENT
Start: 2022-11-06 | End: 2022-11-07 | Stop reason: HOSPADM

## 2022-11-06 RX ORDER — CARVEDILOL 3.12 MG/1
3.12 TABLET ORAL 2 TIMES DAILY WITH MEALS
Status: DISCONTINUED | OUTPATIENT
Start: 2022-11-07 | End: 2022-11-11 | Stop reason: HOSPADM

## 2022-11-06 RX ORDER — PROCHLORPERAZINE EDISYLATE 5 MG/ML
5 INJECTION INTRAMUSCULAR; INTRAVENOUS
Status: DISCONTINUED | OUTPATIENT
Start: 2022-11-06 | End: 2022-11-07 | Stop reason: HOSPADM

## 2022-11-06 RX ORDER — ONDANSETRON 2 MG/ML
4 INJECTION INTRAMUSCULAR; INTRAVENOUS
Status: DISCONTINUED | OUTPATIENT
Start: 2022-11-06 | End: 2022-11-07 | Stop reason: HOSPADM

## 2022-11-06 RX ORDER — SODIUM CHLORIDE 0.9 % (FLUSH) 0.9 %
5-40 SYRINGE (ML) INJECTION PRN
Status: DISCONTINUED | OUTPATIENT
Start: 2022-11-06 | End: 2022-11-07 | Stop reason: HOSPADM

## 2022-11-06 RX ORDER — ACETAMINOPHEN 650 MG/1
650 SUPPOSITORY RECTAL EVERY 6 HOURS PRN
Status: DISCONTINUED | OUTPATIENT
Start: 2022-11-06 | End: 2022-11-11 | Stop reason: HOSPADM

## 2022-11-06 RX ORDER — OXYCODONE HYDROCHLORIDE 5 MG/1
5 TABLET ORAL PRN
Status: ACTIVE | OUTPATIENT
Start: 2022-11-06 | End: 2022-11-06

## 2022-11-06 RX ORDER — SUCCINYLCHOLINE/SOD CL,ISO/PF 200MG/10ML
SYRINGE (ML) INTRAVENOUS PRN
Status: DISCONTINUED | OUTPATIENT
Start: 2022-11-06 | End: 2022-11-06 | Stop reason: SDUPTHER

## 2022-11-06 RX ORDER — SODIUM CHLORIDE 0.9 % (FLUSH) 0.9 %
5-40 SYRINGE (ML) INJECTION EVERY 12 HOURS SCHEDULED
Status: DISCONTINUED | OUTPATIENT
Start: 2022-11-06 | End: 2022-11-11 | Stop reason: HOSPADM

## 2022-11-06 RX ORDER — SODIUM CHLORIDE 9 MG/ML
50 INJECTION, SOLUTION INTRAVENOUS ONCE
Status: DISCONTINUED | OUTPATIENT
Start: 2022-11-06 | End: 2022-11-06

## 2022-11-06 RX ORDER — LEVOTHYROXINE SODIUM 0.05 MG/1
50 TABLET ORAL DAILY
Status: DISCONTINUED | OUTPATIENT
Start: 2022-11-07 | End: 2022-11-11 | Stop reason: HOSPADM

## 2022-11-06 RX ORDER — SODIUM CHLORIDE 0.9 % (FLUSH) 0.9 %
5-40 SYRINGE (ML) INJECTION EVERY 12 HOURS SCHEDULED
Status: DISCONTINUED | OUTPATIENT
Start: 2022-11-06 | End: 2022-11-07 | Stop reason: HOSPADM

## 2022-11-06 RX ORDER — ONDANSETRON 2 MG/ML
INJECTION INTRAMUSCULAR; INTRAVENOUS PRN
Status: DISCONTINUED | OUTPATIENT
Start: 2022-11-06 | End: 2022-11-06 | Stop reason: SDUPTHER

## 2022-11-06 RX ORDER — CEPHALEXIN 500 MG/1
500 CAPSULE ORAL EVERY 6 HOURS SCHEDULED
Status: DISCONTINUED | OUTPATIENT
Start: 2022-11-07 | End: 2022-11-11 | Stop reason: HOSPADM

## 2022-11-06 RX ORDER — SODIUM CHLORIDE 0.9 % (FLUSH) 0.9 %
5-40 SYRINGE (ML) INJECTION PRN
Status: DISCONTINUED | OUTPATIENT
Start: 2022-11-06 | End: 2022-11-11 | Stop reason: HOSPADM

## 2022-11-06 RX ORDER — SODIUM CHLORIDE 9 MG/ML
INJECTION, SOLUTION INTRAVENOUS PRN
Status: DISCONTINUED | OUTPATIENT
Start: 2022-11-06 | End: 2022-11-07 | Stop reason: HOSPADM

## 2022-11-06 RX ORDER — LIDOCAINE HYDROCHLORIDE 20 MG/ML
INJECTION, SOLUTION EPIDURAL; INFILTRATION; INTRACAUDAL; PERINEURAL PRN
Status: DISCONTINUED | OUTPATIENT
Start: 2022-11-06 | End: 2022-11-06 | Stop reason: SDUPTHER

## 2022-11-06 RX ORDER — PHENYLEPHRINE HYDROCHLORIDE 10 MG/ML
INJECTION INTRAVENOUS PRN
Status: DISCONTINUED | OUTPATIENT
Start: 2022-11-06 | End: 2022-11-06 | Stop reason: SDUPTHER

## 2022-11-06 RX ORDER — ONDANSETRON 2 MG/ML
4 INJECTION INTRAMUSCULAR; INTRAVENOUS EVERY 6 HOURS PRN
Status: DISCONTINUED | OUTPATIENT
Start: 2022-11-06 | End: 2022-11-11 | Stop reason: HOSPADM

## 2022-11-06 RX ORDER — SODIUM CHLORIDE 9 MG/ML
50 INJECTION, SOLUTION INTRAVENOUS ONCE
Status: COMPLETED | OUTPATIENT
Start: 2022-11-07 | End: 2022-11-07

## 2022-11-06 RX ORDER — SODIUM CHLORIDE, SODIUM LACTATE, POTASSIUM CHLORIDE, CALCIUM CHLORIDE 600; 310; 30; 20 MG/100ML; MG/100ML; MG/100ML; MG/100ML
INJECTION, SOLUTION INTRAVENOUS CONTINUOUS
Status: DISCONTINUED | OUTPATIENT
Start: 2022-11-06 | End: 2022-11-10

## 2022-11-06 RX ORDER — PROPOFOL 10 MG/ML
INJECTION, EMULSION INTRAVENOUS PRN
Status: DISCONTINUED | OUTPATIENT
Start: 2022-11-06 | End: 2022-11-06 | Stop reason: SDUPTHER

## 2022-11-06 RX ORDER — ONDANSETRON 8 MG/1
4 TABLET, ORALLY DISINTEGRATING ORAL EVERY 8 HOURS PRN
Status: DISCONTINUED | OUTPATIENT
Start: 2022-11-06 | End: 2022-11-11 | Stop reason: HOSPADM

## 2022-11-06 RX ORDER — DIPHENHYDRAMINE HYDROCHLORIDE 50 MG/ML
12.5 INJECTION INTRAMUSCULAR; INTRAVENOUS
Status: DISCONTINUED | OUTPATIENT
Start: 2022-11-06 | End: 2022-11-07 | Stop reason: HOSPADM

## 2022-11-06 RX ORDER — ROCURONIUM BROMIDE 10 MG/ML
INJECTION, SOLUTION INTRAVENOUS PRN
Status: DISCONTINUED | OUTPATIENT
Start: 2022-11-06 | End: 2022-11-06 | Stop reason: SDUPTHER

## 2022-11-06 RX ORDER — SODIUM CHLORIDE 9 MG/ML
INJECTION, SOLUTION INTRAVENOUS PRN
Status: DISCONTINUED | OUTPATIENT
Start: 2022-11-06 | End: 2022-11-09

## 2022-11-06 RX ADMIN — PHENYLEPHRINE HYDROCHLORIDE 100 MCG: 10 INJECTION INTRAVENOUS at 23:06

## 2022-11-06 RX ADMIN — PHENYLEPHRINE HYDROCHLORIDE 100 MCG: 10 INJECTION INTRAVENOUS at 23:01

## 2022-11-06 RX ADMIN — ONDANSETRON 4 MG: 2 INJECTION INTRAMUSCULAR; INTRAVENOUS at 23:16

## 2022-11-06 RX ADMIN — PROPOFOL 50 MG: 10 INJECTION, EMULSION INTRAVENOUS at 23:04

## 2022-11-06 RX ADMIN — LIDOCAINE HYDROCHLORIDE 60 MG: 20 INJECTION, SOLUTION EPIDURAL; INFILTRATION; INTRACAUDAL; PERINEURAL at 22:59

## 2022-11-06 RX ADMIN — PHENYLEPHRINE HYDROCHLORIDE 100 MCG: 10 INJECTION INTRAVENOUS at 23:21

## 2022-11-06 RX ADMIN — PHENYLEPHRINE HYDROCHLORIDE 100 MCG: 10 INJECTION INTRAVENOUS at 23:20

## 2022-11-06 RX ADMIN — Medication 140 MG: at 23:00

## 2022-11-06 RX ADMIN — ROCURONIUM BROMIDE 5 MG: 10 INJECTION, SOLUTION INTRAVENOUS at 22:59

## 2022-11-06 RX ADMIN — SODIUM CHLORIDE 80 MG: 9 INJECTION, SOLUTION INTRAMUSCULAR; INTRAVENOUS; SUBCUTANEOUS at 20:27

## 2022-11-06 RX ADMIN — PROPOFOL 150 MG: 10 INJECTION, EMULSION INTRAVENOUS at 23:00

## 2022-11-06 ASSESSMENT — ENCOUNTER SYMPTOMS
PHOTOPHOBIA: 0
SHORTNESS OF BREATH: 0
NAUSEA: 1
BLOOD IN STOOL: 1
ABDOMINAL PAIN: 0
DIARRHEA: 0
COUGH: 0
VOMITING: 1
SORE THROAT: 0

## 2022-11-06 ASSESSMENT — PAIN - FUNCTIONAL ASSESSMENT: PAIN_FUNCTIONAL_ASSESSMENT: NONE - DENIES PAIN

## 2022-11-06 NOTE — ED TRIAGE NOTES
Pt arrives via ems from home. Pt c/o coffee ground emesis, dark stools. BP soft en route 100/50's range. Positive for orthostastics. Pt denies abd pain and is on blood thinners.

## 2022-11-07 ENCOUNTER — APPOINTMENT (OUTPATIENT)
Dept: INTERVENTIONAL RADIOLOGY/VASCULAR | Age: 77
DRG: 377 | End: 2022-11-07
Payer: MEDICARE

## 2022-11-07 ENCOUNTER — TELEPHONE (OUTPATIENT)
Dept: CARDIOLOGY CLINIC | Age: 77
End: 2022-11-07

## 2022-11-07 LAB
ALBUMIN SERPL-MCNC: 3.1 G/DL (ref 3.2–4.6)
ALBUMIN/GLOB SERPL: 1.4 {RATIO} (ref 0.4–1.6)
ALP SERPL-CCNC: 62 U/L (ref 50–136)
ALT SERPL-CCNC: 15 U/L (ref 12–65)
ANION GAP SERPL CALC-SCNC: 4 MMOL/L (ref 2–11)
APTT PPP: 29.1 SEC (ref 24.5–34.2)
AST SERPL-CCNC: 11 U/L (ref 15–37)
BASOPHILS # BLD: 0 K/UL (ref 0–0.2)
BASOPHILS NFR BLD: 0 % (ref 0–2)
BILIRUB SERPL-MCNC: 0.6 MG/DL (ref 0.2–1.1)
BUN SERPL-MCNC: 37 MG/DL (ref 8–23)
CALCIUM SERPL-MCNC: 8.6 MG/DL (ref 8.3–10.4)
CHLORIDE SERPL-SCNC: 114 MMOL/L (ref 101–110)
CO2 SERPL-SCNC: 25 MMOL/L (ref 21–32)
CREAT SERPL-MCNC: 0.8 MG/DL (ref 0.6–1)
DIFFERENTIAL METHOD BLD: ABNORMAL
EKG ATRIAL RATE: 0 BPM
EKG DIAGNOSIS: NORMAL
EKG Q-T INTERVAL: 403 MS
EKG QRS DURATION: 132 MS
EKG QTC CALCULATION (BAZETT): 441 MS
EKG R AXIS: 120 DEGREES
EKG T AXIS: 9 DEGREES
EKG VENTRICULAR RATE: 72 BPM
EOSINOPHIL # BLD: 0 K/UL (ref 0–0.8)
EOSINOPHIL NFR BLD: 0 % (ref 0.5–7.8)
ERYTHROCYTE [DISTWIDTH] IN BLOOD BY AUTOMATED COUNT: 12.7 % (ref 11.9–14.6)
ERYTHROCYTE [DISTWIDTH] IN BLOOD BY AUTOMATED COUNT: 12.7 % (ref 11.9–14.6)
GLOBULIN SER CALC-MCNC: 2.2 G/DL (ref 2.8–4.5)
GLUCOSE SERPL-MCNC: 157 MG/DL (ref 65–100)
HCT VFR BLD AUTO: 21.3 % (ref 35.8–46.3)
HCT VFR BLD AUTO: 23 % (ref 35.8–46.3)
HCT VFR BLD AUTO: 23 % (ref 35.8–46.3)
HCT VFR BLD AUTO: 23.7 % (ref 35.8–46.3)
HCT VFR BLD AUTO: 24.3 % (ref 35.8–46.3)
HGB BLD-MCNC: 6.9 G/DL (ref 11.7–15.4)
HGB BLD-MCNC: 7.4 G/DL (ref 11.7–15.4)
HGB BLD-MCNC: 7.5 G/DL (ref 11.7–15.4)
HGB BLD-MCNC: 7.8 G/DL (ref 11.7–15.4)
HGB BLD-MCNC: 8 G/DL (ref 11.7–15.4)
HISTORY CHECK: NORMAL
IMM GRANULOCYTES # BLD AUTO: 0 K/UL (ref 0–0.5)
IMM GRANULOCYTES NFR BLD AUTO: 0 % (ref 0–5)
INR PPP: 1.2
INR PPP: 1.3
LACTATE SERPL-SCNC: 0.9 MMOL/L (ref 0.4–2)
LYMPHOCYTES # BLD: 0.7 K/UL (ref 0.5–4.6)
LYMPHOCYTES NFR BLD: 8 % (ref 13–44)
MAGNESIUM SERPL-MCNC: 2.2 MG/DL (ref 1.8–2.4)
MCH RBC QN AUTO: 30.5 PG (ref 26.1–32.9)
MCH RBC QN AUTO: 30.8 PG (ref 26.1–32.9)
MCHC RBC AUTO-ENTMCNC: 32.2 G/DL (ref 31.4–35)
MCHC RBC AUTO-ENTMCNC: 32.6 G/DL (ref 31.4–35)
MCV RBC AUTO: 93.5 FL (ref 82–102)
MCV RBC AUTO: 95.8 FL (ref 82–102)
MONOCYTES # BLD: 0.1 K/UL (ref 0.1–1.3)
MONOCYTES NFR BLD: 1 % (ref 4–12)
NEUTS SEG # BLD: 8.2 K/UL (ref 1.7–8.2)
NEUTS SEG NFR BLD: 91 % (ref 43–78)
NRBC # BLD: 0 K/UL (ref 0–0.2)
NRBC # BLD: 0 K/UL (ref 0–0.2)
PLATELET # BLD AUTO: 128 K/UL (ref 150–450)
PLATELET # BLD AUTO: 135 K/UL (ref 150–450)
PMV BLD AUTO: 10.3 FL (ref 9.4–12.3)
PMV BLD AUTO: 10.8 FL (ref 9.4–12.3)
POTASSIUM SERPL-SCNC: 4.4 MMOL/L (ref 3.5–5.1)
PROT SERPL-MCNC: 5.3 G/DL (ref 6.3–8.2)
PROTHROMBIN TIME: 15.8 SEC (ref 12.6–14.3)
PROTHROMBIN TIME: 16.7 SEC (ref 12.6–14.3)
RBC # BLD AUTO: 2.4 M/UL (ref 4.05–5.2)
RBC # BLD AUTO: 2.46 M/UL (ref 4.05–5.2)
SODIUM SERPL-SCNC: 143 MMOL/L (ref 133–143)
UFH PPP CHRO-ACNC: 0.47 IU/ML (ref 0.3–0.7)
UFH PPP CHRO-ACNC: 0.51 IU/ML (ref 0.3–0.7)
WBC # BLD AUTO: 9 K/UL (ref 4.3–11.1)
WBC # BLD AUTO: 9.1 K/UL (ref 4.3–11.1)

## 2022-11-07 PROCEDURE — 85014 HEMATOCRIT: CPT

## 2022-11-07 PROCEDURE — 85520 HEPARIN ASSAY: CPT

## 2022-11-07 PROCEDURE — 1100000000 HC RM PRIVATE

## 2022-11-07 PROCEDURE — 6370000000 HC RX 637 (ALT 250 FOR IP): Performed by: FAMILY MEDICINE

## 2022-11-07 PROCEDURE — 80053 COMPREHEN METABOLIC PANEL: CPT

## 2022-11-07 PROCEDURE — 83605 ASSAY OF LACTIC ACID: CPT

## 2022-11-07 PROCEDURE — A4216 STERILE WATER/SALINE, 10 ML: HCPCS | Performed by: FAMILY MEDICINE

## 2022-11-07 PROCEDURE — 85730 THROMBOPLASTIN TIME PARTIAL: CPT

## 2022-11-07 PROCEDURE — 99222 1ST HOSP IP/OBS MODERATE 55: CPT | Performed by: INTERNAL MEDICINE

## 2022-11-07 PROCEDURE — 85025 COMPLETE CBC W/AUTO DIFF WBC: CPT

## 2022-11-07 PROCEDURE — 86900 BLOOD TYPING SEROLOGIC ABO: CPT

## 2022-11-07 PROCEDURE — 2580000003 HC RX 258: Performed by: FAMILY MEDICINE

## 2022-11-07 PROCEDURE — 83735 ASSAY OF MAGNESIUM: CPT

## 2022-11-07 PROCEDURE — 2580000003 HC RX 258: Performed by: ANESTHESIOLOGY

## 2022-11-07 PROCEDURE — 6360000002 HC RX W HCPCS: Performed by: NURSE PRACTITIONER

## 2022-11-07 PROCEDURE — 6360000004 HC RX CONTRAST MEDICATION: Performed by: RADIOLOGY

## 2022-11-07 PROCEDURE — 6360000002 HC RX W HCPCS: Performed by: FAMILY MEDICINE

## 2022-11-07 PROCEDURE — 99152 MOD SED SAME PHYS/QHP 5/>YRS: CPT

## 2022-11-07 PROCEDURE — C9113 INJ PANTOPRAZOLE SODIUM, VIA: HCPCS | Performed by: FAMILY MEDICINE

## 2022-11-07 PROCEDURE — 85610 PROTHROMBIN TIME: CPT

## 2022-11-07 PROCEDURE — P9016 RBC LEUKOCYTES REDUCED: HCPCS

## 2022-11-07 PROCEDURE — 6360000002 HC RX W HCPCS: Performed by: RADIOLOGY

## 2022-11-07 PROCEDURE — 85027 COMPLETE CBC AUTOMATED: CPT

## 2022-11-07 PROCEDURE — 2500000003 HC RX 250 WO HCPCS: Performed by: RADIOLOGY

## 2022-11-07 PROCEDURE — 36415 COLL VENOUS BLD VENIPUNCTURE: CPT

## 2022-11-07 PROCEDURE — 36430 TRANSFUSION BLD/BLD COMPNT: CPT

## 2022-11-07 PROCEDURE — 86923 COMPATIBILITY TEST ELECTRIC: CPT

## 2022-11-07 RX ORDER — FENTANYL CITRATE 50 UG/ML
INJECTION, SOLUTION INTRAMUSCULAR; INTRAVENOUS
Status: COMPLETED | OUTPATIENT
Start: 2022-11-07 | End: 2022-11-07

## 2022-11-07 RX ORDER — HEPARIN SODIUM 10000 [USP'U]/100ML
5-30 INJECTION, SOLUTION INTRAVENOUS CONTINUOUS
Status: DISCONTINUED | OUTPATIENT
Start: 2022-11-07 | End: 2022-11-11

## 2022-11-07 RX ORDER — MIDAZOLAM HYDROCHLORIDE 1 MG/ML
INJECTION INTRAMUSCULAR; INTRAVENOUS
Status: COMPLETED | OUTPATIENT
Start: 2022-11-07 | End: 2022-11-07

## 2022-11-07 RX ORDER — LIDOCAINE HYDROCHLORIDE 20 MG/ML
INJECTION, SOLUTION INFILTRATION; PERINEURAL
Status: COMPLETED | OUTPATIENT
Start: 2022-11-07 | End: 2022-11-07

## 2022-11-07 RX ORDER — HEPARIN SODIUM 1000 [USP'U]/ML
4000 INJECTION, SOLUTION INTRAVENOUS; SUBCUTANEOUS ONCE
Status: COMPLETED | OUTPATIENT
Start: 2022-11-07 | End: 2022-11-07

## 2022-11-07 RX ORDER — HEPARIN SODIUM 1000 [USP'U]/ML
4000 INJECTION, SOLUTION INTRAVENOUS; SUBCUTANEOUS PRN
Status: DISCONTINUED | OUTPATIENT
Start: 2022-11-07 | End: 2022-11-11

## 2022-11-07 RX ORDER — SODIUM CHLORIDE 9 MG/ML
INJECTION, SOLUTION INTRAVENOUS PRN
Status: DISCONTINUED | OUTPATIENT
Start: 2022-11-07 | End: 2022-11-09

## 2022-11-07 RX ORDER — HEPARIN SODIUM 1000 [USP'U]/ML
2000 INJECTION, SOLUTION INTRAVENOUS; SUBCUTANEOUS PRN
Status: DISCONTINUED | OUTPATIENT
Start: 2022-11-07 | End: 2022-11-11

## 2022-11-07 RX ADMIN — SODIUM CHLORIDE, SODIUM LACTATE, POTASSIUM CHLORIDE, AND CALCIUM CHLORIDE: 600; 310; 30; 20 INJECTION, SOLUTION INTRAVENOUS at 22:07

## 2022-11-07 RX ADMIN — MIDAZOLAM 0.5 MG: 1 INJECTION INTRAMUSCULAR; INTRAVENOUS at 09:14

## 2022-11-07 RX ADMIN — SODIUM CHLORIDE 40 MG: 9 INJECTION INTRAMUSCULAR; INTRAVENOUS; SUBCUTANEOUS at 20:22

## 2022-11-07 RX ADMIN — CEPHALEXIN 500 MG: 500 CAPSULE ORAL at 23:48

## 2022-11-07 RX ADMIN — SODIUM CHLORIDE 40 MG: 9 INJECTION INTRAMUSCULAR; INTRAVENOUS; SUBCUTANEOUS at 05:47

## 2022-11-07 RX ADMIN — CARVEDILOL 3.12 MG: 3.12 TABLET, FILM COATED ORAL at 16:08

## 2022-11-07 RX ADMIN — CEPHALEXIN 500 MG: 500 CAPSULE ORAL at 05:47

## 2022-11-07 RX ADMIN — FENTANYL CITRATE 25 MCG: 50 INJECTION INTRAMUSCULAR; INTRAVENOUS at 08:40

## 2022-11-07 RX ADMIN — FENTANYL CITRATE 25 MCG: 50 INJECTION INTRAMUSCULAR; INTRAVENOUS at 09:14

## 2022-11-07 RX ADMIN — ACETAMINOPHEN 650 MG: 325 TABLET ORAL at 22:09

## 2022-11-07 RX ADMIN — FENTANYL CITRATE 50 MCG: 50 INJECTION INTRAMUSCULAR; INTRAVENOUS at 08:35

## 2022-11-07 RX ADMIN — IOPAMIDOL 69 ML: 612 INJECTION, SOLUTION INTRAVENOUS at 09:17

## 2022-11-07 RX ADMIN — CEPHALEXIN 500 MG: 500 CAPSULE ORAL at 12:48

## 2022-11-07 RX ADMIN — HEPARIN SODIUM AND DEXTROSE 11 UNITS/KG/HR: 10000; 5 INJECTION INTRAVENOUS at 15:09

## 2022-11-07 RX ADMIN — PHYTONADIONE 10 MG: 10 INJECTION, EMULSION INTRAMUSCULAR; INTRAVENOUS; SUBCUTANEOUS at 01:33

## 2022-11-07 RX ADMIN — CEPHALEXIN 500 MG: 500 CAPSULE ORAL at 01:30

## 2022-11-07 RX ADMIN — MIDAZOLAM 1 MG: 1 INJECTION INTRAMUSCULAR; INTRAVENOUS at 08:35

## 2022-11-07 RX ADMIN — LEVOTHYROXINE SODIUM 50 MCG: 0.05 TABLET ORAL at 05:48

## 2022-11-07 RX ADMIN — PROTHROMBIN, COAGULATION FACTOR VII HUMAN, COAGULATION FACTOR IX HUMAN, COAGULATION FACTOR X HUMAN, PROTEIN C, PROTEIN S HUMAN, AND WATER 1014 UNITS: KIT at 01:11

## 2022-11-07 RX ADMIN — HEPARIN SODIUM 4000 UNITS: 1000 INJECTION INTRAVENOUS; SUBCUTANEOUS at 15:02

## 2022-11-07 RX ADMIN — SODIUM CHLORIDE, PRESERVATIVE FREE 10 ML: 5 INJECTION INTRAVENOUS at 01:28

## 2022-11-07 RX ADMIN — MIDAZOLAM 0.5 MG: 1 INJECTION INTRAMUSCULAR; INTRAVENOUS at 08:40

## 2022-11-07 RX ADMIN — ACETAMINOPHEN 650 MG: 325 TABLET ORAL at 16:07

## 2022-11-07 RX ADMIN — LIDOCAINE HYDROCHLORIDE 5 ML: 20 INJECTION, SOLUTION INFILTRATION; PERINEURAL at 08:45

## 2022-11-07 RX ADMIN — SODIUM CHLORIDE, PRESERVATIVE FREE 10 ML: 5 INJECTION INTRAVENOUS at 20:22

## 2022-11-07 RX ADMIN — SODIUM CHLORIDE, SODIUM LACTATE, POTASSIUM CHLORIDE, AND CALCIUM CHLORIDE: 600; 310; 30; 20 INJECTION, SOLUTION INTRAVENOUS at 00:55

## 2022-11-07 RX ADMIN — CARVEDILOL 3.12 MG: 3.12 TABLET, FILM COATED ORAL at 07:30

## 2022-11-07 RX ADMIN — CEPHALEXIN 500 MG: 500 CAPSULE ORAL at 18:33

## 2022-11-07 RX ADMIN — SODIUM CHLORIDE 50 ML: 9 INJECTION, SOLUTION INTRAVENOUS at 01:11

## 2022-11-07 RX ADMIN — SODIUM CHLORIDE, PRESERVATIVE FREE 10 ML: 5 INJECTION INTRAVENOUS at 09:00

## 2022-11-07 ASSESSMENT — PAIN SCALES - GENERAL
PAINLEVEL_OUTOF10: 0
PAINLEVEL_OUTOF10: 2
PAINLEVEL_OUTOF10: 0
PAINLEVEL_OUTOF10: 3
PAINLEVEL_OUTOF10: 0

## 2022-11-07 ASSESSMENT — ENCOUNTER SYMPTOMS
VOMITING: 1
ALLERGIC/IMMUNOLOGIC NEGATIVE: 1
RESPIRATORY NEGATIVE: 1
EYES NEGATIVE: 1
BLOOD IN STOOL: 1

## 2022-11-07 ASSESSMENT — PAIN - FUNCTIONAL ASSESSMENT
PAIN_FUNCTIONAL_ASSESSMENT: ACTIVITIES ARE NOT PREVENTED
PAIN_FUNCTIONAL_ASSESSMENT: NONE - DENIES PAIN

## 2022-11-07 ASSESSMENT — PAIN DESCRIPTION - DESCRIPTORS
DESCRIPTORS: ACHING
DESCRIPTORS: ACHING

## 2022-11-07 ASSESSMENT — PAIN DESCRIPTION - LOCATION
LOCATION: HEAD
LOCATION: HEAD

## 2022-11-07 NOTE — ANESTHESIA PRE PROCEDURE
Department of Anesthesiology  Preprocedure Note       Name:  John Aponte   Age:  77 y.o.  :  1945                                          MRN:  [id number]         Date:  2022      Surgeon: Connie Aquino):  Mariusz Parikh MD    Procedure: Procedure(s):  EGD CONTROL HEMORRHAGE    Medications prior to admission:   Prior to Admission medications    Medication Sig Start Date End Date Taking? Authorizing Provider   potassium chloride (KLOR-CON) 10 MEQ extended release tablet Take 1 tablet by mouth daily 22   Zan Salgado MD   furosemide (LASIX) 20 MG tablet Take 1 tablet by mouth daily TAKE ONE TABLET BY MOUTH ONE TIME DAILY 22   Zan Salgado MD   losartan (COZAAR) 25 MG tablet Take 0.5 tablets by mouth daily TAKE ONE-HALF TABLET BY MOUTH ONE TIME DAILY 22   Zan Salgado MD   diclofenac sodium (VOLTAREN) 1 % GEL Apply 2 g topically 4 times daily as needed for Pain 8/15/22   Triny Toney MD   levothyroxine (SYNTHROID) 50 MCG tablet Take 1 tablet by mouth in the morning.  TAKE ONE TABLET BY MOUTH ONE TIME DAILY BEFORE BREAKFAST. 22   Triny Toney MD   aspirin 81 MG EC tablet Take 81 mg by mouth daily    Ar Automatic Reconciliation   Biotin 2.5 MG CAPS Take 2 tablets by mouth 2 times daily    Ar Automatic Reconciliation   carvedilol (COREG) 12.5 MG tablet TAKE ONE TABLET BY MOUTH TWICE A DAY 10/15/21   Ar Automatic Reconciliation   cefadroxil (DURICEF) 500 MG capsule Take 500 mg by mouth 2 times daily 3/8/22   Ar Automatic Reconciliation   vitamin D (CHOLECALCIFEROL) 25 MCG (1000 UT) TABS tablet Take 1,000 Units by mouth daily    Ar Automatic Reconciliation   warfarin (COUMADIN) 5 MG tablet TAKE ONE-HALF TO ONE TABLET BY MOUTH ONE TIME DAILY AS DIRECTED 21   Ar Automatic Reconciliation       Current medications:    Current Facility-Administered Medications   Medication Dose Route Frequency Provider Last Rate Last Admin    sodium chloride flush 0.9 % injection 5-40 mL  5-40 mL IntraVENous 2 times per day Dickson Cindy, DO        sodium chloride flush 0.9 % injection 5-40 mL  5-40 mL IntraVENous PRN Dickson Cindy, DO        0.9 % sodium chloride infusion   IntraVENous PRN Dickson Cindy,         ondansetron (ZOFRAN-ODT) disintegrating tablet 4 mg  4 mg Oral Q8H PRN Dicksonmarkos White,         Or    ondansetron [facility]) injection 4 mg  4 mg IntraVENous Q6H PRN Dickson Cindy, DO        acetaminophen (TYLENOL) tablet 650 mg  650 mg Oral Q6H PRN Dicksonmarkos White,         Or    acetaminophen (TYLENOL) suppository 650 mg  650 mg Rectal Q6H PRN Dicksonmarkos White,         [START ON 11/7/2022] pantoprazole (PROTONIX) 40 mg in sodium chloride (PF) 0.9 % 10 mL injection  40 mg IntraVENous Q12H Dicksonmarkos White DO        [START ON 11/7/2022] cephALEXin (KEFLEX) capsule 500 mg  500 mg Oral 4 times per day Dicksonmarkos White DO        [START ON 11/7/2022] levothyroxine (SYNTHROID) tablet 50 mcg  50 mcg Oral Daily Dicksonmarkos White DO        [START ON 11/7/2022] carvedilol (COREG) tablet 3.125 mg  3.125 mg Oral BID WC Dicksonmarkos HernandezasDO         Current Outpatient Medications   Medication Sig Dispense Refill    potassium chloride (KLOR-CON) 10 MEQ extended release tablet Take 1 tablet by mouth daily 90 tablet 3    furosemide (LASIX) 20 MG tablet Take 1 tablet by mouth daily TAKE ONE TABLET BY MOUTH ONE TIME DAILY 90 tablet 3    losartan (COZAAR) 25 MG tablet Take 0.5 tablets by mouth daily TAKE ONE-HALF TABLET BY MOUTH ONE TIME DAILY 45 tablet 3    diclofenac sodium (VOLTAREN) 1 % GEL Apply 2 g topically 4 times daily as needed for Pain 50 g 0    levothyroxine (SYNTHROID) 50 MCG tablet Take 1 tablet by mouth in the morning. TAKE ONE TABLET BY MOUTH ONE TIME DAILY BEFORE BREAKFAST.  90 tablet 3    aspirin 81 MG EC tablet Take 81 mg by mouth daily      Biotin 2.5 MG CAPS Take 2 tablets by mouth 2 times daily      carvedilol (COREG) 12.5 MG tablet TAKE ONE TABLET BY MOUTH TWICE A DAY      cefadroxil (DURICEF) 500 MG capsule Take 500 mg by mouth 2 times daily      vitamin D (CHOLECALCIFEROL) 25 MCG (1000 UT) TABS tablet Take 1,000 Units by mouth daily      warfarin (COUMADIN) 5 MG tablet TAKE ONE-HALF TO ONE TABLET BY MOUTH ONE TIME DAILY AS DIRECTED         Allergies:     Allergies   Allergen Reactions    Penicillins Rash       Problem List:    Patient Active Problem List   Diagnosis Code    Chronic bacterial endocarditis I33.0    Pacemaker Z95.0    History of mitral valve replacement with mechanical valve Z95.2    History of prosthetic heart valve Z95.2    Cardiac pacemaker Z95.0    Chronic a-fib (HCC) I48.20    Tricuspid valve disorder I07.9    History of aortic valve replacement with bioprosthetic valve Z95.3    Chronic systolic heart failure (HCC) I50.22    Hx of bacterial endocarditis Z86.79    Chronic renal failure N18.9    Permanent atrial fibrillation (HCC) I48.21    Hypertension I10    Long term (current) use of anticoagulants Z79.01    Dyslipidemia E78.5    Acute upper gastrointestinal hemorrhage K92.2    ABLA (acute blood loss anemia) D62    Warfarin anticoagulation Z79.01    Hypercoagulable state, secondary ([facility] [address] D68.69       Past Medical History:        Diagnosis Date    Abnormal Pap smear of cervix     [facility] procedure in her 25s    Arrhythmia     Arthritis     Bacteremia     Cardiac pacemaker 7/15/2016    Chronic atrial fibrillation ([facility] [address] 2/17/2016    Chronic renal failure     Chronic systolic heart failure (HCC)     Dyslipidemia     Endocarditis     [facility]     History of aortic valve replacement with bioprosthetic valve 7/15/2016    History of prosthetic heart valve 2010    Bovine aortic valve    Hx of bacterial endocarditis 2012    Hypertension     Mechanical heart valve present 2001    mechanical mitral valve    Menopause     AGE 49    Pacemaker 2010    Permanent atrial fibrillation ([facility] [address]     Rheumatic fever     Sleep apnea     Tricuspid valve disorder        Past Surgical History:        Procedure Laterality Date    [facility]    MITRAL VALVE REPLACEMENT  2001    [facility]    OTHER SURGICAL HISTORY      s/p annuloplasty ring by pt report    PACEMAKER      [place] CARDIAC SURG PROCEDURE UNLIST      TONSILLECTOMY         Social History:    Social History     Tobacco Use    Smoking status: Never    Smokeless tobacco: Never   Substance Use Topics    Alcohol use: No                                Counseling given: Not Answered      Vital Signs (Current):   Vitals:    11/06/22 1930 11/06/22 1945 11/06/22 2000 11/06/22 2015   BP: (!) 112/54 128/82 122/66 116/64   Pulse: 70 72 73 73   Resp: 17 18 18 22   Temp:       TempSrc:       SpO2: 96% 95% 97% 95%   Weight:       Height:                                                  BP Readings from Last 3 Encounters:   11/06/22 116/64   10/26/22 108/68   08/04/22 112/62       NPO Status:                                                                                 BMI:   Wt Readings from Last 3 Encounters:   11/06/22 184 lb (83.5 kg)   10/26/22 184 lb (83.5 kg)   08/04/22 185 lb (83.9 kg)     Body mass index is 29.7 kg/m².     CBC:   Lab Results   Component Value Date/Time    WBC 8.9 11/06/2022 07:01 PM    RBC 3.00 11/06/2022 07:01 PM    HGB 9.2 11/06/2022 07:01 PM    HCT 28.3 11/06/2022 07:01 PM    MCV 94.3 11/06/2022 07:01 PM    RDW 12.8 11/06/2022 07:01 PM     11/06/2022 07:01 PM       CMP:   Lab Results   Component Value Date/Time     11/06/2022 07:01 PM    K 4.4 11/06/2022 07:01 PM     11/06/2022 07:01 PM    CO2 27 11/06/2022 07:01 PM    BUN 42 11/06/2022 07:01 PM    CREATININE 0.90 11/06/2022 07:01 PM    GFRAA >60 07/28/2022 01:59 PM    AGRATIO 1.8 09/26/2019 08:35 AM    LABGLOM >60 11/06/2022 07:01 PM    GLUCOSE 121 11/06/2022 07:01 PM    PROT 6.6 11/06/2022 07:01 PM    CALCIUM 9.0 11/06/2022 07:01 PM    BILITOT 0.5 11/06/2022 07:01 PM    ALKPHOS 76 11/06/2022 07:01 PM    ALKPHOS 72 09/26/2019 08:35 AM    AST 15 11/06/2022 07:01 PM    ALT 17 11/06/2022 07:01 PM       POC Tests: No results for input(s): POCGLU, POCNA, POCK, POCCL, POCBUN, POCHEMO, POCHCT in the last 72 hours. Coags:   Lab Results   Component Value Date/Time    PROTIME 27.3 11/06/2022 07:01 PM    INR 2.5 11/06/2022 07:01 PM    INR 3.1 05/20/2022 10:29 AM       HCG (If Applicable): No results found for: PREGTESTUR, PREGSERUM, HCG, HCGQUANT     ABGs: No results found for: PHART, PO2ART, YSQ2HRG, ZRK9GBZ, BEART, A2KNPLLQ     Type & Screen (If Applicable):  No results found for: LABABO, LABRH    Drug/Infectious Status (If Applicable):  No results found for: HIV, HEPCAB    COVID-19 Screening (If Applicable):   Lab Results   Component Value Date/Time    COVID19 Not Detected 12/30/2021 02:00 PM    COVID19 Performed 12/30/2021 02:00 PM           Anesthesia Evaluation  Patient summary reviewed and Nursing notes reviewed no history of anesthetic complications:   Airway: Mallampati: II  TM distance: <3 FB   Neck ROM: full  Mouth opening: > = 3 FB   Dental: normal exam         Pulmonary: breath sounds clear to auscultation  (+) sleep apnea:                             Cardiovascular:  Exercise tolerance: no interval change,   (+) hypertension:, pacemaker: pacemaker, dysrhythmias: atrial fibrillation, murmur: Grade 4, Mitral and Tricuspid,         Rhythm: regular  Rate: normal                 ROS comment: Echo from 4/22:    Left Ventricle: Left ventricle size is normal. Mildly increased wall thickness. Normal wall motion. Low normal left ventricular systolic function with a visually estimated EF of 50 - 55%.   Aortic Valve: Mildly calcified cusp.   Left Atrium: Left atrium is severely dilated. LA Vol Index is  51 ml/m2.       H/O bioprosthetic AVR in 2015, mitral ring repair in 2001     Neuro/Psych:   Negative Neuro/Psych ROS GI/Hepatic/Renal:            ROS comment: UGIB. Endo/Other:    (+) hypothyroidism, blood dyscrasia: anticoagulation therapy:., .                 Abdominal:             Vascular: negative vascular ROS. Other Findings:           Anesthesia Plan      general     ASA 3 - emergent       Induction: intravenous and rapid sequence. MIPS: Prophylactic antiemetics administered. Anesthetic plan and risks discussed with patient. Use of blood products discussed with patient whom consented to blood products.                      Jennifer Negrete MD   11/6/2022

## 2022-11-07 NOTE — ED PROVIDER NOTES
Emergency Department Provider Note                   PCP:                Daniela Hutchinson MD               Age: 77 y.o. Sex: female       ICD-10-CM    1. UGIB (upper gastrointestinal bleed)  K92.2           DISPOSITION Decision To Admit 11/06/2022 08:35:26 PM        MDM  Number of Diagnoses or Management Options  UGIB (upper gastrointestinal bleed)  Diagnosis management comments: Ulices Conn appearing 69-year-old female presents to the emergency department for evaluation of near syncope, melena and hematemesis. Patient has a history of GI bleeds. Heme-positive stool on exam.  Patient's INR is 2.5 secondary to mechanical heart valve. Blood pressure 232 systolic at the time of my exam.  Lab work shows normal white count, hemoglobin 9.2, Normal electrolytes, BUN 42, creatinine 0.9, normal LFTs. Patient was typed and screened, is O+. Gastroenterologist made aware. 80 mg Protonix given. NG tube placed for dynamic monitoring of her GI bleed. Hospitalist consulted for admission.          Amount and/or Complexity of Data Reviewed  Clinical lab tests: ordered and reviewed  Tests in the radiology section of CPT®: ordered and reviewed  Discussion of test results with the performing providers: yes  Discuss the patient with other providers: yes  Independent visualization of images, tracings, or specimens: yes    Risk of Complications, Morbidity, and/or Mortality  Presenting problems: high  Diagnostic procedures: high  Management options: high                Orders Placed This Encounter   Procedures    CBC with Auto Differential    CMP    Protime-INR    Cardiac Monitor - ED Only    Pulse Oximetry    NG TUBE, INSERTION    EKG 12 Lead (Select if Upper Abdominal Pain or symptoms of SOB,or Tachycardia)    TYPE AND SCREEN    Saline lock IV        Medications   pantoprazole (PROTONIX) 80 mg in sodium chloride (PF) 0.9 % 20 mL injection (80 mg IntraVENous Given 11/6/22 2027)       New Prescriptions    No medications on sanjay Carrasco is a 77 y.o. female who presents to the Emergency Department with chief complaint of    Chief Complaint   Patient presents with    GI Bleeding      71-year-old female presents to the emerged part with family bedside. Patient is on Coumadin for mechanical heart valve. Reports an episode of hematemesis at home with 2 episodes of maroon-colored melena. Reports compliant with her Coumadin. No history of peptic ulcers or prior GI bleeds. Has complained of intermittent nausea and indigestion off and on for the last few weeks. Also reports near syncope at home. Brought to the ER by EMS secondary to patient's near syncopal episode when trying to get into the vehicle to come to the ER. Review of Systems   Constitutional:  Negative for chills and fever. HENT:  Negative for sore throat. Eyes:  Negative for photophobia. Respiratory:  Negative for cough and shortness of breath. Cardiovascular:  Negative for chest pain. Gastrointestinal:  Positive for blood in stool, nausea and vomiting. Negative for abdominal pain and diarrhea. Genitourinary:  Negative for dysuria. Musculoskeletal:  Negative for neck pain and neck stiffness. Skin:  Negative for rash. Neurological:  Negative for syncope and headaches. Psychiatric/Behavioral:  Negative for confusion. All other systems reviewed and are negative.     Past Medical History:   Diagnosis Date    Abnormal Pap smear of cervix     [facility] procedure in her 25s    Arrhythmia     Arthritis     Bacteremia     Cardiac pacemaker 7/15/2016    Chronic atrial fibrillation ([address] 2/17/2016    Chronic renal failure     Chronic systolic heart failure ([address]     Dyslipidemia     Endocarditis     Heart failure ([address]     History of aortic valve replacement with bioprosthetic valve 7/15/2016    History of prosthetic heart valve 2010    Bovine aortic valve    Hx of bacterial endocarditis 2012    Hypertension     Mechanical heart valve present 2001    mechanical mitral valve    Menopause     AGE 49    Pacemaker 2010    Permanent atrial fibrillation ([address]     Rheumatic fever     Sleep apnea     Tricuspid valve disorder         Past Surgical History:   Procedure Laterality Date    AORTIC VALVE REPLACEMENT  2009    [facility]    MITRAL VALVE REPLACEMENT  2001    [facility]    OTHER SURGICAL HISTORY      s/p annuloplasty ring by pt report    PACEMAKER      AR CARDIAC SURG PROCEDURE UNLIST      TONSILLECTOMY          Family History   Problem Relation Age of Onset    Cancer Mother         Cervical    Breast Cancer Neg Hx     Heart Failure Mother         Social History     Socioeconomic History    Marital status: Single   Tobacco Use    Smoking status: Never    Smokeless tobacco: Never   Vaping Use    Vaping Use: Never used   Substance and Sexual Activity    Alcohol use: No    Drug use: No   Social History Narrative    , Single     Social Determinants of Health     Physical Activity: Inactive    Days of Exercise per Week: 0 days    Minutes of Exercise per Session: 0 min         Penicillins     Previous Medications    ASPIRIN 81 MG EC TABLET    Take 81 mg by mouth daily    BIOTIN 2.5 MG CAPS    Take 2 tablets by mouth 2 times daily    CARVEDILOL (COREG) 12.5 MG TABLET    TAKE ONE TABLET BY MOUTH TWICE A DAY    CEFADROXIL (DURICEF) 500 MG CAPSULE    Take 500 mg by mouth 2 times daily    DICLOFENAC SODIUM (VOLTAREN) 1 % GEL    Apply 2 g topically 4 times daily as needed for Pain    FUROSEMIDE (LASIX) 20 MG TABLET    Take 1 tablet by mouth daily TAKE ONE TABLET BY MOUTH ONE TIME DAILY    LEVOTHYROXINE (SYNTHROID) 50 MCG TABLET    Take 1 tablet by mouth in the morning. TAKE ONE TABLET BY MOUTH ONE TIME DAILY BEFORE BREAKFAST.     LOSARTAN (COZAAR) 25 MG TABLET    Take 0.5 tablets by mouth daily TAKE ONE-HALF TABLET BY MOUTH ONE TIME DAILY    POTASSIUM CHLORIDE (KLOR-CON) 10 MEQ EXTENDED RELEASE TABLET    Take 1 tablet by mouth daily    VITAMIN D (CHOLECALCIFEROL) 25 MCG (1000 UT) TABS TABLET    Take 1,000 Units by mouth daily    WARFARIN (COUMADIN) 5 MG TABLET    TAKE ONE-HALF TO ONE TABLET BY MOUTH ONE TIME DAILY AS DIRECTED        Vitals signs and nursing note reviewed. Patient Vitals for the past 4 hrs:   Temp Pulse Resp BP SpO2   11/06/22 2015 -- 73 22 116/64 95 %   11/06/22 2000 -- 73 18 122/66 97 %   11/06/22 1945 -- 72 18 128/82 95 %   11/06/22 1930 -- 70 17 (!) 112/54 96 %   11/06/22 1915 -- 70 18 -- 97 %   11/06/22 1853 -- 72 17 (!) 104/52 --   11/06/22 1837 98.2 °F (36.8 °C) 72 16 (!) 112/46 --          Physical Exam  Vitals and nursing note reviewed. Constitutional:       Appearance: Normal appearance. She is ill-appearing. Comments: Pale complexion   HENT:      Head: Normocephalic. Nose: Nose normal.      Mouth/Throat:      Mouth: Mucous membranes are moist.   Eyes:      Extraocular Movements: Extraocular movements intact. Cardiovascular:      Rate and Rhythm: Normal rate and regular rhythm. Pulses: Normal pulses. Heart sounds: Normal heart sounds. Pulmonary:      Effort: Pulmonary effort is normal. No respiratory distress. Breath sounds: Normal breath sounds. Abdominal:      General: Abdomen is flat. Palpations: Abdomen is soft. Tenderness: There is no abdominal tenderness. Genitourinary:     Rectum: Guaiac result positive. Comments: Rectal exam: Chaperoned by patient's nurse, Lito HERNANDEZ, heme positive maroon/melanotic stool  Musculoskeletal:         General: No swelling or tenderness. Normal range of motion. Cervical back: Normal range of motion. No rigidity. Skin:     General: Skin is warm. Findings: No rash. Neurological:      General: No focal deficit present. Mental Status: She is alert and oriented to person, place, and time.    Psychiatric:         Mood and Affect: Mood normal.        Procedures    Results for orders placed or performed during the hospital encounter of 11/06/22   CBC with Auto Differential   Result Value Ref Range    WBC 8.9 4.3 - 11.1 K/uL    RBC 3.00 (L) 4.05 - 5.2 M/uL    Hemoglobin 9.2 (L) 11.7 - 15.4 g/dL    Hematocrit 28.3 (L) 35.8 - 46.3 %    MCV 94.3 82 - 102 FL    MCH 30.7 26.1 - 32.9 PG    MCHC 32.5 31.4 - 35.0 g/dL    RDW 12.8 11.9 - 14.6 %    Platelets 602 967 - 702 K/uL    MPV 10.8 9.4 - 12.3 FL    nRBC 0.00 0.0 - 0.2 K/uL    Differential Type AUTOMATED      Seg Neutrophils 84 (H) 43 - 78 %    Lymphocytes 10 (L) 13 - 44 %    Monocytes 5 4.0 - 12.0 %    Eosinophils % 1 0.5 - 7.8 %    Basophils 0 0.0 - 2.0 %    Immature Granulocytes 0 0.0 - 5.0 %    Segs Absolute 7.5 1.7 - 8.2 K/UL    Absolute Lymph # 0.9 0.5 - 4.6 K/UL    Absolute Mono # 0.5 0.1 - 1.3 K/UL    Absolute Eos # 0.0 0.0 - 0.8 K/UL    Basophils Absolute 0.0 0.0 - 0.2 K/UL    Absolute Immature Granulocyte 0.0 0.0 - 0.5 K/UL   CMP   Result Value Ref Range    Sodium 140 133 - 143 mmol/L    Potassium 4.4 3.5 - 5.1 mmol/L    Chloride 112 (H) 101 - 110 mmol/L    CO2 27 21 - 32 mmol/L    Anion Gap 1 (L) 2 - 11 mmol/L    Glucose 121 (H) 65 - 100 mg/dL    BUN 42 (H) 8 - 23 MG/DL    Creatinine 0.90 0.6 - 1.0 MG/DL    Est, Glom Filt Rate >60 >60 ml/min/1.73m2    Calcium 9.0 8.3 - 10.4 MG/DL    Total Bilirubin 0.5 0.2 - 1.1 MG/DL    ALT 17 12 - 65 U/L    AST 15 15 - 37 U/L    Alk Phosphatase 76 50 - 136 U/L    Total Protein 6.6 6.3 - 8.2 g/dL    Albumin 3.6 3.2 - 4.6 g/dL    Globulin 3.0 2.8 - 4.5 g/dL    Albumin/Globulin Ratio 1.2 0.4 - 1.6     Protime-INR   Result Value Ref Range    Protime 27.3 (H) 12.6 - 14.3 sec    INR 2.5     EKG 12 Lead (Select if Upper Abdominal Pain or symptoms of SOB,or Tachycardia)   Result Value Ref Range    Ventricular Rate 72 BPM    Atrial Rate 0 BPM    QRS Duration 132 ms    Q-T Interval 403 ms    QTc Calculation (Bazett) 441 ms    R Axis 120 degrees    T Axis 9 degrees    Diagnosis Accelerated junctional rhythm    TYPE AND SCREEN   Result Value Ref Range    Crossmatch expiration date 11/09/2022,0591     ABO/Rh O POSITIVE     Antibody Screen NEG         No orders to display                       Voice dictation software was used during the making of this note. This software is not perfect and grammatical and other typographical errors may be present. This note has not been completely proofread for errors.         Latrice Lucio, DO  11/06/22 9425

## 2022-11-07 NOTE — ED NOTES
Rectal exam performed by Filomena Perez MD. Liana Marsh RN present. Occult blood test positive at bedside.      Darleen Santana RN  11/06/22 2021

## 2022-11-07 NOTE — OP NOTE
Department of Interventional Radiology  [phone]        Interventional Radiology Brief Procedure Note    Patient: Henrry Salas MRN: [id number]  SSN: [id number]    YOB: 1945  Age: 77 y.o.   Sex: female      Date of Procedure: 11/7/2022    Pre-Procedure Diagnosis: GI bleed    Post-Procedure Diagnosis: SAME    Procedure(s): arteriogram and embo    Brief Description of Procedure: as above    Performed By: Francois Méndez MD     Assistants: None    Anesthesia:Moderate Sedation    Estimated Blood Loss: Less than 10ml    Specimens:  None    Implants:  microcoils    Findings: gda successfully embolized    Complications: None    Recommendations: routine post care     Follow Up: continue to monitor h/h    Signed By: Francois Méndez MD     November 7, 2022

## 2022-11-07 NOTE — TELEPHONE ENCOUNTER
Patients daughter called stating she has started discharging blood and the hospital was changing her coumadin and she is concerned because she has a \"complicated case\"

## 2022-11-07 NOTE — H&P
Gastroenterology Pre op History and Physical    Patient: [facility]    Physician: Karen Olvera MD    Vital Signs: Blood pressure 116/64, pulse 73, temperature 98.2 °F (36.8 °C), temperature source Oral, resp. rate 22, height 5' 6\" (1.676 m), weight 184 lb (83.5 kg), SpO2 95 %. Allergies:    Allergies   Allergen Reactions    Penicillins Rash       Chief Complaint: Hematemesis, coagulopathy    History of Present Illness: As Above    Justification for Procedure: As Above    History:  Past Medical History:   Diagnosis Date    Abnormal Pap smear of cervix     [facility] procedure in her 25s    Arrhythmia     Arthritis     Bacteremia     Cardiac pacemaker 7/15/2016    Chronic atrial fibrillation ([address] 2/17/2016    Chronic renal failure     Chronic systolic heart failure ([address]     Dyslipidemia     Endocarditis     Heart failure ([address]     History of aortic valve replacement with bioprosthetic valve 7/15/2016    History of prosthetic heart valve 2010    Bovine aortic valve    Hx of bacterial endocarditis 2012    Hypertension     Mechanical heart valve present 2001    mechanical mitral valve    Menopause     AGE 51    Pacemaker 2010    Permanent atrial fibrillation (HCC)     Rheumatic fever     Sleep apnea     Tricuspid valve disorder       Past Surgical History:   Procedure Laterality Date    AORTIC VALVE REPLACEMENT  2009    [facility]    MITRAL VALVE REPLACEMENT  2001    [facility]    OTHER SURGICAL HISTORY      s/p annuloplasty ring by pt report    PACEMAKER      [place] CARDIAC SURG PROCEDURE UNLIST      TONSILLECTOMY        Social History     Socioeconomic History    Marital status: Single   Tobacco Use    Smoking status: Never    Smokeless tobacco: Never   Vaping Use    Vaping Use: Never used   Substance and Sexual Activity    Alcohol use: No    Drug use: No   Social History Narrative    , Single     Social Determinants of Health     Physical Activity: Inactive    Days of Exercise per Week: 0 days    Minutes of Exercise per Session: 0 min      Family History   Problem Relation Age of Onset    Cancer Mother         Cervical    Breast Cancer Neg Hx     Heart Failure Mother        Medications:   Prior to Admission medications    Medication Sig Start Date End Date Taking? Authorizing Provider   potassium chloride (KLOR-CON) 10 MEQ extended release tablet Take 1 tablet by mouth daily 11/4/22   Luke Yip MD   furosemide (LASIX) 20 MG tablet Take 1 tablet by mouth daily TAKE ONE TABLET BY MOUTH ONE TIME DAILY 9/26/22   Luke Yip MD   losartan (COZAAR) 25 MG tablet Take 0.5 tablets by mouth daily TAKE ONE-HALF TABLET BY MOUTH ONE TIME DAILY 8/18/22   Luke Yip MD   diclofenac sodium (VOLTAREN) 1 % GEL Apply 2 g topically 4 times daily as needed for Pain 8/15/22   Elyssa Mohan MD   levothyroxine (SYNTHROID) 50 MCG tablet Take 1 tablet by mouth in the morning.  TAKE ONE TABLET BY MOUTH ONE TIME DAILY BEFORE BREAKFAST. 8/4/22   Elyssa Mohan MD   aspirin 81 MG EC tablet Take 81 mg by mouth daily    Ar Automatic Reconciliation   Biotin 2.5 MG CAPS Take 2 tablets by mouth 2 times daily    Ar Automatic Reconciliation   carvedilol (COREG) 12.5 MG tablet TAKE ONE TABLET BY MOUTH TWICE A DAY 10/15/21   Ar Automatic Reconciliation   cefadroxil (DURICEF) 500 MG capsule Take 500 mg by mouth 2 times daily 3/8/22   Ar Automatic Reconciliation   vitamin D (CHOLECALCIFEROL) 25 MCG (1000 UT) TABS tablet Take 1,000 Units by mouth daily    Ar Automatic Reconciliation   warfarin (COUMADIN) 5 MG tablet TAKE ONE-HALF TO ONE TABLET BY MOUTH ONE TIME DAILY AS DIRECTED 4/19/21   Ar Automatic Reconciliation       Physical Exam:         Heart: Paced rhythm  + murmur   Lungs: chest clear, no wheezing, rales, normal symmetric air entry   Abdominal: Bowel sounds are normal, liver is not enlarged, spleen is not enlarged           Findings/Diagnosis:  Hematemesis, GI blood loss anemia Plan:  EGD. Discussed risks including but not limited to bleeding, perforation, acute cardiopulmonary event, sedation risks, IV complications, aspiration, and pt states understanding and agrees to proceed.           Signed:  Merlyn Hathaway MD 11/6/2022

## 2022-11-07 NOTE — PROGRESS NOTES
TRANSFER - IN REPORT:    Verbal report received from [facility] on [place]  being received from IR for routine progression of patient care      Report consisted of patient's Situation, Background, Assessment and   Recommendations(SBAR). Information from the following report(s) Nurse Handoff Report was reviewed with the receiving nurse. Opportunity for questions and clarification was provided. Assessment completed upon patient's arrival to unit and care assumed.

## 2022-11-07 NOTE — CONSULTS
[facility] Consult Note           Referring Provider: ER MD    Consult Date:  11/6/2022    Admit Date:  11/6/2022    Chief Complaint:  Hematemesis, weakness    Subjective:     History of Present Illness:  Patient is a 77 y.o. female who presents since this morning with hematemesis and bloody BMS. Notably she takes coumadin chronically for a fib and a prosthetic MV. Her other extensive cardiac hx is as below. Her Cardiologist is Dr Danni Agustin. Mrs Bri Fortune has never had an EGD or a colonoscopy. Her Bryant guard test was negative in September. She had Covid several months ago. She denies abdominal pain, recent cough or fever. No hx of NSAID  or ETOH use. No hx of liver ds. She just started taking Prilosec for dyspeptic sx. In the ER her hgb is 9.2 where as in July it was 11.2. Her INR is 2.5 which is where she usually is.       PMH:  Past Medical History:   Diagnosis Date    Abnormal Pap smear of cervix     [facility] procedure in her 25s    Arrhythmia     Arthritis     Bacteremia     Cardiac pacemaker 7/15/2016    Chronic atrial fibrillation ([address] 2/17/2016    Chronic renal failure     Chronic systolic heart failure ([address]     Dyslipidemia     Endocarditis     Heart failure ([address]     History of aortic valve replacement with bioprosthetic valve 7/15/2016    History of prosthetic heart valve 2010    Bovine aortic valve    Hx of bacterial endocarditis 2012    Hypertension     Mechanical heart valve present 2001    mechanical mitral valve    Menopause     AGE 51    Pacemaker 2010    Permanent atrial fibrillation (HCC)     Rheumatic fever     Sleep apnea     Tricuspid valve disorder        PSH:  Past Surgical History:   Procedure Laterality Date    AORTIC VALVE REPLACEMENT  2009    [facility]    MITRAL VALVE REPLACEMENT  2001    [facility]    OTHER SURGICAL HISTORY      s/p annuloplasty ring by pt report    PACEMAKER      [place] CARDIAC SURG PROCEDURE UNLIST      TONSILLECTOMY Allergies: Allergies   Allergen Reactions    Penicillins Rash       Home Medications:  Prior to Admission medications    Medication Sig Start Date End Date Taking? Authorizing Provider   potassium chloride (KLOR-CON) 10 MEQ extended release tablet Take 1 tablet by mouth daily 11/4/22   Padmini Zheng MD   furosemide (LASIX) 20 MG tablet Take 1 tablet by mouth daily TAKE ONE TABLET BY MOUTH ONE TIME DAILY 9/26/22   Padmini Zheng MD   losartan (COZAAR) 25 MG tablet Take 0.5 tablets by mouth daily TAKE ONE-HALF TABLET BY MOUTH ONE TIME DAILY 8/18/22   Padmini Zheng MD   diclofenac sodium (VOLTAREN) 1 % GEL Apply 2 g topically 4 times daily as needed for Pain 8/15/22   Josue Castillo MD   levothyroxine (SYNTHROID) 50 MCG tablet Take 1 tablet by mouth in the morning.  TAKE ONE TABLET BY MOUTH ONE TIME DAILY BEFORE BREAKFAST. 8/4/22   Josue Castillo MD   aspirin 81 MG EC tablet Take 81 mg by mouth daily    Ar Automatic Reconciliation   Biotin 2.5 MG CAPS Take 2 tablets by mouth 2 times daily    Ar Automatic Reconciliation   carvedilol (COREG) 12.5 MG tablet TAKE ONE TABLET BY MOUTH TWICE A DAY 10/15/21   Ar Automatic Reconciliation   cefadroxil (DURICEF) 500 MG capsule Take 500 mg by mouth 2 times daily 3/8/22   Ar Automatic Reconciliation   vitamin D (CHOLECALCIFEROL) 25 MCG (1000 UT) TABS tablet Take 1,000 Units by mouth daily    Ar Automatic Reconciliation   warfarin (COUMADIN) 5 MG tablet TAKE ONE-HALF TO ONE TABLET BY MOUTH ONE TIME DAILY AS DIRECTED 4/19/21   Ar Automatic Reconciliation       Hospital Medications:  Current Facility-Administered Medications   Medication Dose Route Frequency    sodium chloride flush 0.9 % injection 5-40 mL  5-40 mL IntraVENous 2 times per day    sodium chloride flush 0.9 % injection 5-40 mL  5-40 mL IntraVENous PRN    0.9 % sodium chloride infusion   IntraVENous PRN    ondansetron (ZOFRAN-ODT) disintegrating tablet 4 mg  4 mg Oral Q8H PRN    Or    ondansetron (ZOFRAN) injection 4 mg  4 mg IntraVENous Q6H PRN    acetaminophen (TYLENOL) tablet 650 mg  650 mg Oral Q6H PRN    Or    acetaminophen (TYLENOL) suppository 650 mg  650 mg Rectal Q6H PRN    [START ON 11/7/2022] pantoprazole (PROTONIX) 40 mg in sodium chloride (PF) 0.9 % 10 mL injection  40 mg IntraVENous Q12H    [START ON 11/7/2022] cephALEXin (KEFLEX) capsule 500 mg  500 mg Oral 4 times per day     Current Outpatient Medications   Medication Sig    potassium chloride (KLOR-CON) 10 MEQ extended release tablet Take 1 tablet by mouth daily    furosemide (LASIX) 20 MG tablet Take 1 tablet by mouth daily TAKE ONE TABLET BY MOUTH ONE TIME DAILY    losartan (COZAAR) 25 MG tablet Take 0.5 tablets by mouth daily TAKE ONE-HALF TABLET BY MOUTH ONE TIME DAILY    diclofenac sodium (VOLTAREN) 1 % GEL Apply 2 g topically 4 times daily as needed for Pain    levothyroxine (SYNTHROID) 50 MCG tablet Take 1 tablet by mouth in the morning. TAKE ONE TABLET BY MOUTH ONE TIME DAILY BEFORE BREAKFAST. aspirin 81 MG EC tablet Take 81 mg by mouth daily    Biotin 2.5 MG CAPS Take 2 tablets by mouth 2 times daily    carvedilol (COREG) 12.5 MG tablet TAKE ONE TABLET BY MOUTH TWICE A DAY    cefadroxil (DURICEF) 500 MG capsule Take 500 mg by mouth 2 times daily    vitamin D (CHOLECALCIFEROL) 25 MCG (1000 UT) TABS tablet Take 1,000 Units by mouth daily    warfarin (COUMADIN) 5 MG tablet TAKE ONE-HALF TO ONE TABLET BY MOUTH ONE TIME DAILY AS DIRECTED       Social History:  Social History     Tobacco Use    Smoking status: Never    Smokeless tobacco: Never   Substance Use Topics    Alcohol use: No       Pt denies any history of drug use, blood transfusions, or tattoos. Family History:  Family History   Problem Relation Age of Onset    Cancer Mother         Cervical    Breast Cancer Neg Hx     Heart Failure Mother        Review of Systems:  A detailed 10 system ROS is obtained, with pertinent positives as listed above. All others are negative. Diet:  NPO. Objective:     Physical Exam:  Vitals:  /64   Pulse 73   Temp 98.2 °F (36.8 °C) (Oral)   Resp 22   Ht 5' 6\" (1.676 m)   Wt 184 lb (83.5 kg)   SpO2 95%   BMI 29.70 kg/m²   Gen:  Pt is alert, cooperative, no acute distress  Skin:  Extremities and face reveal no rashes. HEENT: Sclerae anicteric. Cardiovascular: Regular rate and rhythm. Systolic murmur. MVR click. Respiratory:  Comfortable breathing with no accessory muscle use. Clear breath sounds anteriorly with no wheezes, rales, or rhonchi. GI:  Abdomen nondistended, soft, and nontender. Normal active bowel sounds. No enlargement of the liver or spleen. No masses palpable. Rectal:  Deferred    Laboratory:    Recent Labs     11/06/22  1901   WBC 8.9   HGB 9.2*   HCT 28.3*      MCV 94.3      K 4.4   *   CO2 27   BUN 42*   AST 15   ALT 17   INR 2.5          Assessment:     Hematemesis  Coagulopathy on Coumadin  BPR  Valvular HD  H/O a fib, pacemaker, chronic systolic HF, chronic bacterial endocarditis, AVR bioprosthetic valve     Plan:     -IV PPIs  -Patient is concerned about reversing her coumadin and wants to proceed with a diagnostic EGD to help make decisions regarding these issues. She understands that if necessary therapeutic intervention to control bleeding may be necessary.    -Discussed risks of EGD including but not limited to bleeding, perforation, acute cardiopulmonary event, sedation risks, IV complications, aspiration, and pt states understanding and agrees to proceed. -INR reversal otherwise per IM/Cardiology    JUANITA Mesa MD

## 2022-11-07 NOTE — PROGRESS NOTES
Patient seen and examined, plan reviewed. Agree with the exception of any noted changes/additions. Some old black blood. No pain. Hungry. Wants to eat. Continue IV PPI  Clear liquid diet    Lyle Fernandez MD  [facility], [place]      [facility] Progress Note         Admit Date:  11/6/2022    Today's Date:  11/7/2022    CC:  GI bleed    Subjective:     Patient s/p EGD last night with findings below. This AM had gda successfully embolized with IR. Now laying flat. Has passed at least 2 black stools since IR procedure. Has no other GI complaints. Specifically, no abdominal pain, N/V. Daughter is asking about diet. Pt says she is not yet feeling urge to eat. Medications:   Current Facility-Administered Medications   Medication Dose Route Frequency    sodium chloride flush 0.9 % injection 5-40 mL  5-40 mL IntraVENous 2 times per day    sodium chloride flush 0.9 % injection 5-40 mL  5-40 mL IntraVENous PRN    0.9 % sodium chloride infusion   IntraVENous PRN    ondansetron (ZOFRAN-ODT) disintegrating tablet 4 mg  4 mg Oral Q8H PRN    Or    ondansetron (ZOFRAN) injection 4 mg  4 mg IntraVENous Q6H PRN    acetaminophen (TYLENOL) tablet 650 mg  650 mg Oral Q6H PRN    Or    acetaminophen (TYLENOL) suppository 650 mg  650 mg Rectal Q6H PRN    pantoprazole (PROTONIX) 40 mg in sodium chloride (PF) 0.9 % 10 mL injection  40 mg IntraVENous Q12H    cephALEXin (KEFLEX) capsule 500 mg  500 mg Oral 4 times per day    levothyroxine (SYNTHROID) tablet 50 mcg  50 mcg Oral Daily    carvedilol (COREG) tablet 3.125 mg  3.125 mg Oral BID WC    lactated ringers infusion   IntraVENous Continuous       Review of Systems:  ROS was obtained, with pertinent positives as listed above. No chest pain or SOB.     Diet:  NPO    Objective:   Vitals:  BP (!) 107/51   Pulse 68   Temp 98.1 °F (36.7 °C) (Oral)   Resp 16   Ht 5' 6\" (1.676 m)   Wt 184 lb (83.5 kg)   SpO2 99%   BMI 29.70 kg/m² Intake/Output:  No intake/output data recorded. 11/05 1901 - 11/07 0700  In: -   Out: 350 [Urine:350]  Exam:  General appearance: alert, cooperative, no distress  Lungs: Normal chest expansion. Normal respiratory effort. Abdomen: soft, non-distended. Neuro:  alert and oriented    Data Review (Labs):    Recent Labs     11/06/22  1901 11/07/22  0143 11/07/22  0524   WBC 8.9 9.1 9.0   HGB 9.2* 7.4* 7.5*   HCT 28.3* 23.0* 23.0*    135* 128*   MCV 94.3 95.8 93.5     --  143   K 4.4  --  4.4   *  --  114*   CO2 27  --  25   BUN 42*  --  37*   MG  --   --  2.2   AST 15  --  11*   ALT 17  --  15   INR 2.5 1.3 1.2   APTT  --   --  29.1     EGD 11/6/22 with Dr. Teri Logan for GI bleed Findings:   Esophagus- Normal.  Stomach- Moderate amount of fresh blood in the stomach  A visible vessel with bleeding was seen in the mid to distal antrum slightly posteriorly. Initial endo clip did not take. A second endo clip placed at base,  but still with some bleeding. Another clip was placed with slowing of bleeding. Duodenum- Normal.  Therapies: Endo clip  Specimens: None  Estimated Blood Loss: active bleeding  IMPRESSION: Antral gastric bleeding/visible vessel. Endo clip technique used. Stilll with some bleeding. Recommendations:  Kcentra per IM. Discussed with Dr Pushpa Garcia. NPO. Frequent labs. PPIs IV . Discussed with IR Dr FATIMAH Giles who would like INR less than 2 to consider embolization.     Arteriogram and Embolization with Dr. Sarah Lima 11/7/22 for GI bleed  Findings: gda successfully embolized    Assessment:     Principal Problem:    Acute upper gastrointestinal hemorrhage  Active Problems:    ABLA (acute blood loss anemia)    Warfarin anticoagulation    Hypercoagulable state, secondary ([address]    Chronic bacterial endocarditis    History of mitral valve replacement with mechanical valve    Cardiac pacemaker    History of aortic valve replacement with bioprosthetic valve    Chronic systolic heart failure (HCC)    Permanent atrial fibrillation ([facility]  Resolved Problems:    * No resolved hospital problems. *      77 y.o. female with PMH significant for COVID early 2022, chronic bacterial endocarditis on lifelong Cefadoxil, On chronic Warfarin for Afib and hx mechanical MVR, hx Bovine AVR, chronic CHF (followed by Dr. Jarrett Sawant), pacemaker in situ- seen in GI consult 11/6 for GIB after pt presented with hematemesis and bloody BMS. Reportedly no prior EGD or [place] prior to this admission. Cologuard 9/2022 was negative. No hx of NSAID  or ETOH use. No known hx of liver disease. She had just started taking Prilosec for dyspeptic symptoms. In the ER her hgb was 9.2 from Hgb 11.2 in 7/2022. INR was 2.5. Received Kcentra/IV Vit K for reversal of warfarin. S/p EGD 11/6 as outlined above and IR arteriogram with gda successfully embolized morning of 11/7. Plan:     -Follow for recurrent GI bleeding. -ABLA: Labs early AM 11/7 with Hgb 7.5 from Hgb 7.4. Order in place for H/H q6hr. Due for repeat H/H now. Follow H/H and consider transfusion pRBCs as needed.  -Continue IV PPI bid  -Cardiology consult pending- follow anticoag recs.     Lissa Palmer PA-C  [facility]

## 2022-11-07 NOTE — H&P
[facility] Cardiology Initial Cardiac Evaluation                 Date of  Admission: 11/6/2022  6:51 PM     Primary Care Physician:  Dr. Ramona Robison  Primary Cardiologist:  Dr. Derrek Adams  Referring Physician:  Dr. Jeanie Rivero   Attending Physician:  Dr. Skip Irvin    CC/Reason for consult:  anticoagulation management       [address] is a 77 y.o. female with PMH of permanent a fib, mechanical MV ([place], [address] 2001), chronic bacterial endocarditis,bioprosthetic AV [facility] 2009), HFrEF (echo 4/2022 EF 50-55%), TR annuloplasty ring (per pt), Medtronic PPM, HTN, HLD, who presented to the ED with c/o coffee ground emesis with loose maroon stool. Noted lightheadedness, dizziness with position change. Fecal occult +. Labs showed WBC 6.8, H&H 9.2/28.3, plt 168, , K 4.4, BUN 42, creatinine 0.90. She was admitted for further evaluation. GI was consulted. She underwent EGD which showed visible bleeding vessel in mid to distal antrum. Initial endo clip did not take, a second and third clip placed with still some bleeding. Hgb dropped to 7.4. She received Kcentra for reversal of coumadin. She was taken to IR and underwent successful embolization of the gastroduodenal artery. INR is currently 1.2.         Past Medical History:   Diagnosis Date    Abnormal Pap smear of cervix     [facility] procedure in her 25s    Arrhythmia     Arthritis     Bacteremia     Cardiac pacemaker 7/15/2016    Chronic atrial fibrillation ([address] 2/17/2016    Chronic renal failure     Chronic systolic heart failure ([address]     Dyslipidemia     Endocarditis     Heart failure ([address]     History of aortic valve replacement with bioprosthetic valve 7/15/2016    History of prosthetic heart valve 2010    Bovine aortic valve    Hx of bacterial endocarditis 2012    Hypertension     Mechanical heart valve present 2001    mechanical mitral valve    Menopause     AGE 51    Pacemaker 2010    Permanent atrial fibrillation (HCC)     Rheumatic fever Sleep apnea     Tricuspid valve disorder       Past Surgical History:   Procedure Laterality Date    AORTIC VALVE REPLACEMENT  2009    [facility]    MITRAL VALVE REPLACEMENT  2001    [facility]    OTHER SURGICAL HISTORY      s/p annuloplasty ring by pt report    PACEMAKER      DE CARDIAC SURG PROCEDURE UNLIST      TONSILLECTOMY      UPPER GASTROINTESTINAL ENDOSCOPY N/A 11/6/2022    EGD CONTROL HEMORRHAGE/clipping performed by Simran Scott MD at [facility] ENDOSCOPY     Allergies   Allergen Reactions    Penicillins Rash      Family History   Problem Relation Age of Onset    Cancer Mother         Cervical    Breast Cancer Neg Hx     Heart Failure Mother       Social History     Socioeconomic History    Marital status: Single     Spouse name: Not on file    Number of children: Not on file    Years of education: Not on file    Highest education level: Not on file   Occupational History    Not on file   Tobacco Use    Smoking status: Never    Smokeless tobacco: Never   Vaping Use    Vaping Use: Never used   Substance and Sexual Activity    Alcohol use: No    Drug use: No    Sexual activity: Not on file   Other Topics Concern    Not on file   Social History Narrative    , Single     Social Determinants of Health     Financial Resource Strain: Not on file   Food Insecurity: Not on file   Transportation Needs: Not on file   Physical Activity: Inactive    Days of Exercise per Week: 0 days    Minutes of Exercise per Session: 0 min   Stress: Not on file   Social Connections: Not on file   Intimate Partner Violence: Not on file   Housing Stability: Not on file       Current Facility-Administered Medications   Medication Dose Route Frequency    sodium chloride flush 0.9 % injection 5-40 mL  5-40 mL IntraVENous 2 times per day    sodium chloride flush 0.9 % injection 5-40 mL  5-40 mL IntraVENous PRN    0.9 % sodium chloride infusion   IntraVENous PRN    ondansetron (ZOFRAN-ODT) disintegrating tablet 4 mg  4 mg Oral Q8H PRN    Or    ondansetron (ZOFRAN) injection 4 mg  4 mg IntraVENous Q6H PRN    acetaminophen (TYLENOL) tablet 650 mg  650 mg Oral Q6H PRN    Or    acetaminophen (TYLENOL) suppository 650 mg  650 mg Rectal Q6H PRN    pantoprazole (PROTONIX) 40 mg in sodium chloride (PF) 0.9 % 10 mL injection  40 mg IntraVENous Q12H    cephALEXin (KEFLEX) capsule 500 mg  500 mg Oral 4 times per day    levothyroxine (SYNTHROID) tablet 50 mcg  50 mcg Oral Daily    carvedilol (COREG) tablet 3.125 mg  3.125 mg Oral BID WC    lactated ringers infusion   IntraVENous Continuous       Review of Systems   Constitutional:  Positive for fatigue. HENT: Negative. Eyes: Negative. Respiratory: Negative. Cardiovascular: Negative. Gastrointestinal:  Positive for blood in stool and vomiting. Endocrine: Negative. Genitourinary: Negative. Musculoskeletal: Negative. Skin: Negative. Allergic/Immunologic: Negative. Neurological:  Positive for dizziness and light-headedness. Hematological: Negative. Psychiatric/Behavioral: Negative. Physical Exam  Vitals:    11/07/22 0951 11/07/22 1005 11/07/22 1021 11/07/22 1036   BP: (!) 91/45 (!) 107/55 (!) 103/51 (!) 107/51   Pulse: 69 70 68 68   Resp: 16 16 16 16   Temp:    98.1 °F (36.7 °C)   TempSrc:    Oral   SpO2: 98% 95% 96% 99%   Weight:       Height:           Physical Exam:  Physical Exam  Eyes:      Pupils: Pupils are equal, round, and reactive to light. Cardiovascular:      Rate and Rhythm: Normal rate and regular rhythm. Pulmonary:      Effort: Pulmonary effort is normal.      Breath sounds: Normal breath sounds. Abdominal:      General: Bowel sounds are normal.   Musculoskeletal:         General: Normal range of motion. Skin:     General: Skin is warm and dry. Coloration: Skin is pale. Neurological:      General: No focal deficit present. Mental Status: She is alert and oriented to person, place, and time.    Psychiatric:         Mood and Affect: Mood normal.         Behavior: Behavior normal.         Thought Content:  Thought content normal.         Judgment: Judgment normal.        Cardiographics    Telemetry:  paced  ECG:  paced      Labs:   Recent Results (from the past 24 hour(s))   EKG 12 Lead (Select if Upper Abdominal Pain or symptoms of SOB,or Tachycardia)    Collection Time: 11/06/22  6:42 PM   Result Value Ref Range    Ventricular Rate 72 BPM    Atrial Rate 0 BPM    QRS Duration 132 ms    Q-T Interval 403 ms    QTc Calculation (Bazett) 441 ms    R Axis 120 degrees    T Axis 9 degrees    Diagnosis Electronic ventricular pacemaker    CBC with Auto Differential    Collection Time: 11/06/22  7:01 PM   Result Value Ref Range    WBC 8.9 4.3 - 11.1 K/uL    RBC 3.00 (L) 4.05 - 5.2 M/uL    Hemoglobin 9.2 (L) 11.7 - 15.4 g/dL    Hematocrit 28.3 (L) 35.8 - 46.3 %    MCV 94.3 82 - 102 FL    MCH 30.7 26.1 - 32.9 PG    MCHC 32.5 31.4 - 35.0 g/dL    RDW 12.8 11.9 - 14.6 %    Platelets 513 126 - 610 K/uL    MPV 10.8 9.4 - 12.3 FL    nRBC 0.00 0.0 - 0.2 K/uL    Differential Type AUTOMATED      Seg Neutrophils 84 (H) 43 - 78 %    Lymphocytes 10 (L) 13 - 44 %    Monocytes 5 4.0 - 12.0 %    Eosinophils % 1 0.5 - 7.8 %    Basophils 0 0.0 - 2.0 %    Immature Granulocytes 0 0.0 - 5.0 %    Segs Absolute 7.5 1.7 - 8.2 K/UL    Absolute Lymph # 0.9 0.5 - 4.6 K/UL    Absolute Mono # 0.5 0.1 - 1.3 K/UL    Absolute Eos # 0.0 0.0 - 0.8 K/UL    Basophils Absolute 0.0 0.0 - 0.2 K/UL    Absolute Immature Granulocyte 0.0 0.0 - 0.5 K/UL   CMP    Collection Time: 11/06/22  7:01 PM   Result Value Ref Range    Sodium 140 133 - 143 mmol/L    Potassium 4.4 3.5 - 5.1 mmol/L    Chloride 112 (H) 101 - 110 mmol/L    CO2 27 21 - 32 mmol/L    Anion Gap 1 (L) 2 - 11 mmol/L    Glucose 121 (H) 65 - 100 mg/dL    BUN 42 (H) 8 - 23 MG/DL    Creatinine 0.90 0.6 - 1.0 MG/DL    Est, Glom Filt Rate >60 >60 ml/min/1.73m2    Calcium 9.0 8.3 - 10.4 MG/DL    Total Bilirubin 0.5 0.2 - 1.1 MG/DL ALT 17 12 - 65 U/L    AST 15 15 - 37 U/L    Alk Phosphatase 76 50 - 136 U/L    Total Protein 6.6 6.3 - 8.2 g/dL    Albumin 3.6 3.2 - 4.6 g/dL    Globulin 3.0 2.8 - 4.5 g/dL    Albumin/Globulin Ratio 1.2 0.4 - 1.6     Protime-INR    Collection Time: 11/06/22  7:01 PM   Result Value Ref Range    Protime 27.3 (H) 12.6 - 14.3 sec    INR 2.5     TYPE AND SCREEN    Collection Time: 11/06/22  7:01 PM   Result Value Ref Range    Crossmatch expiration date 11/09/2022,2359     ABO/Rh O POSITIVE     Antibody Screen NEG    Protime-INR    Collection Time: 11/07/22  1:43 AM   Result Value Ref Range    Protime 16.7 (H) 12.6 - 14.3 sec    INR 1.3     CBC    Collection Time: 11/07/22  1:43 AM   Result Value Ref Range    WBC 9.1 4.3 - 11.1 K/uL    RBC 2.40 (L) 4.05 - 5.2 M/uL    Hemoglobin 7.4 (L) 11.7 - 15.4 g/dL    Hematocrit 23.0 (L) 35.8 - 46.3 %    MCV 95.8 82 - 102 FL    MCH 30.8 26.1 - 32.9 PG    MCHC 32.2 31.4 - 35.0 g/dL    RDW 12.7 11.9 - 14.6 %    Platelets 138 (L) 392 - 450 K/uL    MPV 10.8 9.4 - 12.3 FL    nRBC 0.00 0.0 - 0.2 K/uL   Protime-INR    Collection Time: 11/07/22  5:24 AM   Result Value Ref Range    Protime 15.8 (H) 12.6 - 14.3 sec    INR 1.2     APTT    Collection Time: 11/07/22  5:24 AM   Result Value Ref Range    PTT 29.1 24.5 - 34.2 SEC   Comprehensive Metabolic Panel    Collection Time: 11/07/22  5:24 AM   Result Value Ref Range    Sodium 143 133 - 143 mmol/L    Potassium 4.4 3.5 - 5.1 mmol/L    Chloride 114 (H) 101 - 110 mmol/L    CO2 25 21 - 32 mmol/L    Anion Gap 4 2 - 11 mmol/L    Glucose 157 (H) 65 - 100 mg/dL    BUN 37 (H) 8 - 23 MG/DL    Creatinine 0.80 0.6 - 1.0 MG/DL    Est, Glom Filt Rate >60 >60 ml/min/1.73m2    Calcium 8.6 8.3 - 10.4 MG/DL    Total Bilirubin 0.6 0.2 - 1.1 MG/DL    ALT 15 12 - 65 U/L    AST 11 (L) 15 - 37 U/L    Alk Phosphatase 62 50 - 136 U/L    Total Protein 5.3 (L) 6.3 - 8.2 g/dL    Albumin 3.1 (L) 3.2 - 4.6 g/dL    Globulin 2.2 (L) 2.8 - 4.5 g/dL    Albumin/Globulin Ratio 1.4 0.4 - 1.6     Magnesium    Collection Time: 11/07/22  5:24 AM   Result Value Ref Range    Magnesium 2.2 1.8 - 2.4 mg/dL   CBC with Auto Differential    Collection Time: 11/07/22  5:24 AM   Result Value Ref Range    WBC 9.0 4.3 - 11.1 K/uL    RBC 2.46 (L) 4.05 - 5.2 M/uL    Hemoglobin 7.5 (L) 11.7 - 15.4 g/dL    Hematocrit 23.0 (L) 35.8 - 46.3 %    MCV 93.5 82 - 102 FL    MCH 30.5 26.1 - 32.9 PG    MCHC 32.6 31.4 - 35.0 g/dL    RDW 12.7 11.9 - 14.6 %    Platelets 363 (L) 324 - 450 K/uL    MPV 10.3 9.4 - 12.3 FL    nRBC 0.00 0.0 - 0.2 K/uL    Differential Type AUTOMATED      Seg Neutrophils 91 (H) 43 - 78 %    Lymphocytes 8 (L) 13 - 44 %    Monocytes 1 (L) 4.0 - 12.0 %    Eosinophils % 0 (L) 0.5 - 7.8 %    Basophils 0 0.0 - 2.0 %    Immature Granulocytes 0 0.0 - 5.0 %    Segs Absolute 8.2 1.7 - 8.2 K/UL    Absolute Lymph # 0.7 0.5 - 4.6 K/UL    Absolute Mono # 0.1 0.1 - 1.3 K/UL    Absolute Eos # 0.0 0.0 - 0.8 K/UL    Basophils Absolute 0.0 0.0 - 0.2 K/UL    Absolute Immature Granulocyte 0.0 0.0 - 0.5 K/UL   Lactic Acid    Collection Time: 11/07/22  5:24 AM   Result Value Ref Range    Lactic Acid, Plasma 0.9 0.4 - 2.0 MMOL/L       Assessment/Plan:     Assessment:      Principal Problem:    Acute upper gastrointestinal hemorrhage  -- s/p embolization of the gda    Active Problems:   History of mitral valve replacement with mechanical valve  -- need to start heparin bridge ASAP  -- consult pharmacy to dose coumadin  -- stop asa, do not continue at discharge  -- close follow up with Dr. Anna Garza after discharge     History of aortic valve replacement with bioprosthetic valve     Chronic systolic heart failure   -- appears euvolemic      Permanent atrial fibrillation   -- rates are controlled       ABLA (acute blood loss anemia)  -- monitor H&H       Warfarin anticoagulation      Chronic bacterial endocarditis      Cardiac pacemaker  -- medtronic          Thank you very much for this referral. We appreciate the opportunity to participate in this patient's care. We will follow along with above stated plan.     Jamila Hayes NP, APRN - CNP  Attending MD: Dao Boone

## 2022-11-07 NOTE — TELEPHONE ENCOUNTER
Spoke with Dr. Mary Jane Leong and he stated pt needs to make sure at discharge she will need be discharged home on Coumadin or Lovenox due to mechanical mitral valve. He also stated if pt needs to, she may request a Cardiology consult while in hospital. Daughter notified of above and instructed to call back with any further questions or concerns.

## 2022-11-07 NOTE — ED NOTES
Patient to go to OR for procedure prior to transfer to floor. Admission RN notified of change in plan.      Liliana Alonzo RN  11/06/22 [id number]

## 2022-11-07 NOTE — PROGRESS NOTES
GI/Dr. Maico Leija    Pt was off of the floor for IR embolization when I attempted to see this AM.  Will re-attempt to see later today.   Jagdish Schroeder PA-C/[facility]

## 2022-11-07 NOTE — PROGRESS NOTES
I have discussed with the patient the rationale for blood component transfusion; its benefits in treating or preventing fatigue, organ damage, or death; and its risk which includes mild transfusion reactions, rare risk of blood borne infection, or more serious but rare reactions. I have discussed the alternatives to transfusion, including the risk and consequences of not receiving transfusion. The patient had an opportunity to ask questions and had agreed to proceed with transfusion of blood components. Patient has been throwing up and vomiting quite a bit lately.   Autumn states the COVID test is negative.   Can he get something for the nausea and relax him a little.      Pharmacy:  [facility] on [address]

## 2022-11-07 NOTE — CONSULTS
[facility] Consult Note           Referring Provider: Dr Jenifer Alva Date:  11/6/2022    Admit Date:  11/6/2022    Chief Complaint: Hematemesis, coumadin use    Subjective:     History of Present Illness:  Patient is a 77 y.o. female with PMH as below. This morning felt weak and had episodes of hematemesis and maroon stools. She is on coumadin for atrial fibrillation and a mechanical MV. Other cardiac hx includes a AV bioprosthetic valve, a cardiac pacemaker, chronic systolic HF and chronic bacterial endocarditis. Her most recent echo showed an EF of 49 %. PMH:  Past Medical History:   Diagnosis Date    Abnormal Pap smear of cervix     [facility] procedure in her 25s    Arrhythmia     Arthritis     Bacteremia     Cardiac pacemaker 7/15/2016    Chronic atrial fibrillation ([address] 2/17/2016    Chronic renal failure     Chronic systolic heart failure ([address]     Dyslipidemia     Endocarditis     Heart failure ([address]     History of aortic valve replacement with bioprosthetic valve 7/15/2016    History of prosthetic heart valve 2010    Bovine aortic valve    Hx of bacterial endocarditis 2012    Hypertension     Mechanical heart valve present 2001    mechanical mitral valve    Menopause     AGE 51    Pacemaker 2010    Permanent atrial fibrillation (HCC)     Rheumatic fever     Sleep apnea     Tricuspid valve disorder        PSH:  Past Surgical History:   Procedure Laterality Date    AORTIC VALVE REPLACEMENT  2009    [facility]    MITRAL VALVE REPLACEMENT  2001    [facility]    OTHER SURGICAL HISTORY      s/p annuloplasty ring by pt report    PACEMAKER      [place] CARDIAC SURG PROCEDURE UNLIST      TONSILLECTOMY         Allergies: Allergies   Allergen Reactions    Penicillins Rash       Home Medications:  Prior to Admission medications    Medication Sig Start Date End Date Taking?  Authorizing Provider   potassium chloride (KLOR-CON) 10 MEQ extended release tablet Take 1 tablet by mouth daily 11/4/22   Manuel Olivia MD   furosemide (LASIX) 20 MG tablet Take 1 tablet by mouth daily TAKE ONE TABLET BY MOUTH ONE TIME DAILY 9/26/22   Manuel Olivia MD   losartan (COZAAR) 25 MG tablet Take 0.5 tablets by mouth daily TAKE ONE-HALF TABLET BY MOUTH ONE TIME DAILY 8/18/22   Manuel Olivia MD   diclofenac sodium (VOLTAREN) 1 % GEL Apply 2 g topically 4 times daily as needed for Pain 8/15/22   Stormy Liu MD   levothyroxine (SYNTHROID) 50 MCG tablet Take 1 tablet by mouth in the morning.  TAKE ONE TABLET BY MOUTH ONE TIME DAILY BEFORE BREAKFAST. 8/4/22   Stormy Liu MD   aspirin 81 MG EC tablet Take 81 mg by mouth daily    Ar Automatic Reconciliation   Biotin 2.5 MG CAPS Take 2 tablets by mouth 2 times daily    Ar Automatic Reconciliation   carvedilol (COREG) 12.5 MG tablet TAKE ONE TABLET BY MOUTH TWICE A DAY 10/15/21   Ar Automatic Reconciliation   cefadroxil (DURICEF) 500 MG capsule Take 500 mg by mouth 2 times daily 3/8/22   Ar Automatic Reconciliation   vitamin D (CHOLECALCIFEROL) 25 MCG (1000 UT) TABS tablet Take 1,000 Units by mouth daily    Ar Automatic Reconciliation   warfarin (COUMADIN) 5 MG tablet TAKE ONE-HALF TO ONE TABLET BY MOUTH ONE TIME DAILY AS DIRECTED 4/19/21   Ar Automatic Reconciliation       Hospital Medications:  Current Facility-Administered Medications   Medication Dose Route Frequency    sodium chloride flush 0.9 % injection 5-40 mL  5-40 mL IntraVENous 2 times per day    sodium chloride flush 0.9 % injection 5-40 mL  5-40 mL IntraVENous PRN    0.9 % sodium chloride infusion   IntraVENous PRN    ondansetron (ZOFRAN-ODT) disintegrating tablet 4 mg  4 mg Oral Q8H PRN    Or    ondansetron (ZOFRAN) injection 4 mg  4 mg IntraVENous Q6H PRN    acetaminophen (TYLENOL) tablet 650 mg  650 mg Oral Q6H PRN    Or    acetaminophen (TYLENOL) suppository 650 mg  650 mg Rectal Q6H PRN    [START ON 11/7/2022] pantoprazole (PROTONIX) 40 mg in sodium chloride (PF) 0.9 % 10 mL injection  40 mg IntraVENous Q12H     Current Outpatient Medications   Medication Sig    potassium chloride (KLOR-CON) 10 MEQ extended release tablet Take 1 tablet by mouth daily    furosemide (LASIX) 20 MG tablet Take 1 tablet by mouth daily TAKE ONE TABLET BY MOUTH ONE TIME DAILY    losartan (COZAAR) 25 MG tablet Take 0.5 tablets by mouth daily TAKE ONE-HALF TABLET BY MOUTH ONE TIME DAILY    diclofenac sodium (VOLTAREN) 1 % GEL Apply 2 g topically 4 times daily as needed for Pain    levothyroxine (SYNTHROID) 50 MCG tablet Take 1 tablet by mouth in the morning. TAKE ONE TABLET BY MOUTH ONE TIME DAILY BEFORE BREAKFAST. aspirin 81 MG EC tablet Take 81 mg by mouth daily    Biotin 2.5 MG CAPS Take 2 tablets by mouth 2 times daily    carvedilol (COREG) 12.5 MG tablet TAKE ONE TABLET BY MOUTH TWICE A DAY    cefadroxil (DURICEF) 500 MG capsule Take 500 mg by mouth 2 times daily    vitamin D (CHOLECALCIFEROL) 25 MCG (1000 UT) TABS tablet Take 1,000 Units by mouth daily    warfarin (COUMADIN) 5 MG tablet TAKE ONE-HALF TO ONE TABLET BY MOUTH ONE TIME DAILY AS DIRECTED       Social History:  Social History     Tobacco Use    Smoking status: Never    Smokeless tobacco: Never   Substance Use Topics    Alcohol use: No       Pt denies any history of drug use, blood transfusions, or tattoos. Family History:  Family History   Problem Relation Age of Onset    Cancer Mother         Cervical    Breast Cancer Neg Hx     Heart Failure Mother        Review of Systems:  A detailed 10 system ROS is obtained, with pertinent positives as listed above. All others are negative. Diet:      Objective:     Physical Exam:  Vitals:  /64   Pulse 73   Temp 98.2 °F (36.8 °C) (Oral)   Resp 22   Ht 5' 6\" (1.676 m)   Wt 184 lb (83.5 kg)   SpO2 95%   BMI 29.70 kg/m²   Gen:  Pt is alert, cooperative, no acute distress  Skin:  Extremities and face reveal no rashes. HEENT: Sclerae anicteric.   Extra-occular muscles are intact. No oral ulcers. No abnormal pigmentation of the lips. The neck is supple. Cardiovascular: Regular rate and rhythm. No murmurs, gallops, or rubs. Respiratory:  Comfortable breathing with no accessory muscle use. Clear breath sounds anteriorly with no wheezes, rales, or rhonchi. GI:  Abdomen nondistended, soft, and nontender. Normal active bowel sounds. No enlargement of the liver or spleen. No masses palpable. Rectal:  Deferred  Musculoskeletal:  No pitting edema of the lower legs. Neurological:  Gross memory appears intact. Patient is alert and oriented. Psychiatric:  Mood appears appropriate with judgement intact. Lymphatic:  No cervical or supraclavicular adenopathy. Laboratory:    Recent Labs     11/06/22  1901   WBC 8.9   HGB 9.2*   HCT 28.3*      MCV 94.3      K 4.4   *   CO2 27   BUN 42*   AST 15   ALT 17   INR 2.5          Assessment:     Principal Problem:    Acute upper gastrointestinal hemorrhage  Resolved Problems:    * No resolved hospital problems.  *      Plan:     IV Protonix   Follow hgb and for s/o bleeding  Consider endoscopy post further stabilization    Serafin Almonte MD

## 2022-11-07 NOTE — H&P
Hospitalist History and Physical   Admit Date:  2022  6:51 PM   Name:  Margot Chin   Age:  77 y.o. Sex:  female  :  1945   MRN:  [id number]   Room:  ER15/15    Presenting Complaint: GI Bleeding     Reason(s) for Admission: Acute upper gastrointestinal hemorrhage [K92.2]     History of Present Illness:   Margot Chin is a 77 y.o. 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 Also had maroon red loose stools. Has lightheadedness, dizziness with position change but if lying supine she is stable. She is on warfarin and ASA. Denies use of NSAIDs. No prior h/o GIB. Denies abdominal pain. Normal cologuard on 22. Rec'd plasma after open heart surgery 20 years ago and had allergic rxn. Both daughter and patient unsure what type of plasma. BP soft while en route 100/50s with positive orthostatics. In ED, patient's MAP 68-70. Fecal occult positive. Scr 0.9 BUN 42 hgb 9.2  (BL 11-12)PT 27.3 INR 2.5. she was loaded with protonix 80 mg IV and both hospitalist & GI were consulted for admission. Review of Systems:  10 systems reviewed and negative except as noted in HPI. Assessment & Plan:     Acute upper GIB // ABLA // warfarin anticoagulation  -  recommended warfarin reversal with IV vitamin K + Kcentra 1000 units. Patient not comfortable with risk of stroke from mechanical heart valve and AFIB. I discussed her case with on-call cardiologist, Renan Capone who said the recommendation is to reverse if actively bleeding.   Patient requested I speak with Salud Lucero however he is not on call. GI was also involved in discussion and decision  making. Detailed discussion regarding risks, benefits and alternative treatments. Plan is to hold reversal, under go emergent EGD to assess if there is active bleeding. Risks associated with this were also discussed. Norma Charles has been prepared by pharmD and is good for 4 hours if agreeable to rapid reversal based on clinical progression and endoscopy. Hold off on NGT unless urgently indicated d/t risk of mucusoal irritation further increasing risk of bleed. Hold ASA and warfarin. IV PPI BID. Trend h/h. If reversed, plan on heparin bridge once stable from a bleeding standpoint. Given risk of diluting her clotting factors, will not start cIVF   Appreciate GI assistance. Chronic bacterial endocarditis - cefadroxil not on formulary, start keflex 500 QID. Ok to bring home meds. Mechanical mitral valve replacement - on lifelong warfarin and abx suppression. History of prosthetic aortc valve replacement - stable. Permenant afib - rate controlled. INR theraputic. F/b Dr Amrita Stafford. Secondary hypercoagulable state - counseled extensively. Unclear which is more of risk bleeding or thrombus formation causing stroke. Counseled in great detail. Chronic systolic heart failure - compensated. LVEF perserved 4/2022. Hold losartan d/t risk of hypotension and shock. Reduce coreg to prevent severe hypotension, avoid aburpt discontinuation of BB and rebound tachycardia    Hypothyroidism - synthroid       Patient is critically ill. Without intervention, there is a high probability of acute organ impairment or life-threatening deterioration in the patient's condition from: ABLA, UGIB, hypercoaguable state   Total critical care time spent: 53 minutes. Time is indicative of direct patient attendance at bedside and on the patient's floor nearby.   Includes time spent at bedside performing history and exam, performing chart review, discussing findings and treatment plan with patient and/or family, discussing patient with nursing staff, consultants and colleagues, and ordering/reviewing pertinent laboratory and radiographic evaluations. Time excludes procedures. CPT:  24994: First 30-74 minutes  42951: Each block of 30 min. beyond 76          Anticipated discharge needs:         Diet: Diet NPO Exceptions are: Ice Chips  VTE ppx: SCDs for now   Code status: Full Code    Hospital Problems:  Principal Problem:    Acute upper gastrointestinal hemorrhage  Resolved Problems:    * No resolved hospital problems.  *       Past History:     Past Medical History:   Diagnosis Date    Abnormal Pap smear of cervix     [facility] procedure in her 25s    Arrhythmia     Arthritis     Bacteremia     Cardiac pacemaker 7/15/2016    Chronic atrial fibrillation ([address] 2/17/2016    Chronic renal failure     Chronic systolic heart failure ([address]     Dyslipidemia     Endocarditis     Heart failure ([address]     History of aortic valve replacement with bioprosthetic valve 7/15/2016    History of prosthetic heart valve 2010    Bovine aortic valve    Hx of bacterial endocarditis 2012    Hypertension     Mechanical heart valve present 2001    mechanical mitral valve    Menopause     AGE 51    Pacemaker 2010    Permanent atrial fibrillation ([address]     Rheumatic fever     Sleep apnea     Tricuspid valve disorder        Past Surgical History:   Procedure Laterality Date    AORTIC VALVE REPLACEMENT  2009    [facility]    MITRAL VALVE REPLACEMENT  2001    [facility]    OTHER SURGICAL HISTORY      s/p annuloplasty ring by pt report    PACEMAKER      [facility]LIST      TONSILLECTOMY          Social History     Tobacco Use    Smoking status: Never    Smokeless tobacco: Never   Substance Use Topics    Alcohol use: No      Social History     Substance and Sexual Activity   Drug Use No       Family History   Problem Relation Age of Onset    Cancer Mother         Cervical    Breast Cancer Neg Hx     Heart Failure Mother         Immunization History   Administered Date(s) Administered    COVID-19, PFIZER PURPLE top, DILUTE for use, (age 15 y+), 30mcg/0.3mL 01/19/2021, 02/09/2021    Influenza, FLUAD, (age 72 y+), Adjuvanted, 0.5mL 09/10/2020    Influenza, High Dose (Fluzone 65 yrs and older) 11/10/2015, 10/12/2018    Influenza, Triv, inactivated, subunit, adjuvanted, IM (Fluad 65 yrs and older) 10/12/2018, 10/11/2019    Pneumococcal Conjugate 13-valent ([id number]) 03/09/2017    Pneumococcal Polysaccharide ([id number]) 06/27/2014    Tdap (Boostrix, Adacel) 07/31/2015     Allergies   Allergen Reactions    Penicillins Rash     Prior to Admit Medications:  Current Outpatient Medications   Medication Instructions    aspirin 81 mg, Oral, DAILY    Biotin 2.5 MG CAPS 2 tablets, Oral, 2 TIMES DAILY    carvedilol (COREG) 12.5 MG tablet TAKE ONE TABLET BY MOUTH TWICE A DAY    cefadroxil (DURICEF) 500 mg, Oral, 2 TIMES DAILY    diclofenac sodium (VOLTAREN) 2 g, Topical, 4 TIMES DAILY PRN    furosemide (LASIX) 20 mg, Oral, DAILY, TAKE ONE TABLET BY MOUTH ONE TIME DAILY    levothyroxine (SYNTHROID) 50 mcg, Oral, DAILY, TAKE ONE TABLET BY MOUTH ONE TIME DAILY BEFORE BREAKFAST    losartan (COZAAR) 12.5 mg, Oral, DAILY, TAKE ONE-HALF TABLET BY MOUTH ONE TIME DAILY    potassium chloride (KLOR-CON) 10 MEQ extended release tablet 10 mEq, Oral, DAILY    vitamin D (CHOLECALCIFEROL) 1,000 Units, Oral, DAILY    warfarin (COUMADIN) 5 MG tablet TAKE ONE-HALF TO ONE TABLET BY MOUTH ONE TIME DAILY AS DIRECTED         Objective:   Patient Vitals for the past 24 hrs:   Temp Pulse Resp BP SpO2   11/06/22 2015 -- 73 22 116/64 95 %   11/06/22 2000 -- 73 18 122/66 97 %   11/06/22 1945 -- 72 18 128/82 95 %   11/06/22 1930 -- 70 17 (!) 112/54 96 %   11/06/22 1915 -- 70 18 -- 97 %   11/06/22 1853 -- 72 17 (!) 104/52 --   11/06/22 1837 98.2 °F (36.8 °C) 72 16 (!) 112/46 --       Oxygen Therapy  SpO2: 95 %  Pulse via Oximetry: 70 beats per minute    Estimated body mass index is 29.7 kg/m² as calculated from the following:    Height as of this encounter: 5' 6\" (1.676 m). Weight as of this encounter: 184 lb (83.5 kg). No intake or output data in the 24 hours ending 11/06/22 2052      Physical Exam:    Blood pressure 116/64, pulse 73, temperature 98.2 °F (36.8 °C), temperature source Oral, resp. rate 22, height 5' 6\" (1.676 m), weight 184 lb (83.5 kg), SpO2 95 %. General:    Mildly ill appearing. Lying supine. Non toxic. Head:  Normocephalic, atraumatic  Eyes:  Sclerae appear normal.  Pupils equally round. ENT:  Nares appear normal, no drainage. Moist oral mucosa  Neck:  Trachea midline   CV:   Regular rate. Irregular rhythm. +murmur. No m/r/g. No jugular venous distension. Lungs:   CTAB. No wheezing, rhonchi, or rales. Symmetric expansion. Abdomen: Bowel sounds present. Soft, nontender, nondistended. Extremities: No cyanosis or clubbing. No edema  Skin:     Diffusely pale. No jaundice. Neuro:  CN II-XII grossly intact. Sensation intact. A&Ox3  Psych:  Anxious mood and affect.       I have personally reviewed labs and tests showing:  Recent Labs:  Recent Results (from the past 24 hour(s))   EKG 12 Lead (Select if Upper Abdominal Pain or symptoms of SOB,or Tachycardia)    Collection Time: 11/06/22  6:42 PM   Result Value Ref Range    Ventricular Rate 72 BPM    Atrial Rate 0 BPM    QRS Duration 132 ms    Q-T Interval 403 ms    QTc Calculation (Bazett) 441 ms    R Axis 120 degrees    T Axis 9 degrees    Diagnosis Accelerated junctional rhythm    CBC with Auto Differential    Collection Time: 11/06/22  7:01 PM   Result Value Ref Range    WBC 8.9 4.3 - 11.1 K/uL    RBC 3.00 (L) 4.05 - 5.2 M/uL    Hemoglobin 9.2 (L) 11.7 - 15.4 g/dL    Hematocrit 28.3 (L) 35.8 - 46.3 %    MCV 94.3 82 - 102 FL    MCH 30.7 26.1 - 32.9 PG    MCHC 32.5 31.4 - 35.0 g/dL    RDW 12.8 11.9 - 14.6 % Platelets 594 629 - 413 K/uL    MPV 10.8 9.4 - 12.3 FL    nRBC 0.00 0.0 - 0.2 K/uL    Differential Type AUTOMATED      Seg Neutrophils 84 (H) 43 - 78 %    Lymphocytes 10 (L) 13 - 44 %    Monocytes 5 4.0 - 12.0 %    Eosinophils % 1 0.5 - 7.8 %    Basophils 0 0.0 - 2.0 %    Immature Granulocytes 0 0.0 - 5.0 %    Segs Absolute 7.5 1.7 - 8.2 K/UL    Absolute Lymph # 0.9 0.5 - 4.6 K/UL    Absolute Mono # 0.5 0.1 - 1.3 K/UL    Absolute Eos # 0.0 0.0 - 0.8 K/UL    Basophils Absolute 0.0 0.0 - 0.2 K/UL    Absolute Immature Granulocyte 0.0 0.0 - 0.5 K/UL   CMP    Collection Time: 11/06/22  7:01 PM   Result Value Ref Range    Sodium 140 133 - 143 mmol/L    Potassium 4.4 3.5 - 5.1 mmol/L    Chloride 112 (H) 101 - 110 mmol/L    CO2 27 21 - 32 mmol/L    Anion Gap 1 (L) 2 - 11 mmol/L    Glucose 121 (H) 65 - 100 mg/dL    BUN 42 (H) 8 - 23 MG/DL    Creatinine 0.90 0.6 - 1.0 MG/DL    Est, Glom Filt Rate >60 >60 ml/min/1.73m2    Calcium 9.0 8.3 - 10.4 MG/DL    Total Bilirubin 0.5 0.2 - 1.1 MG/DL    ALT 17 12 - 65 U/L    AST 15 15 - 37 U/L    Alk Phosphatase 76 50 - 136 U/L    Total Protein 6.6 6.3 - 8.2 g/dL    Albumin 3.6 3.2 - 4.6 g/dL    Globulin 3.0 2.8 - 4.5 g/dL    Albumin/Globulin Ratio 1.2 0.4 - 1.6     Protime-INR    Collection Time: 11/06/22  7:01 PM   Result Value Ref Range    Protime 27.3 (H) 12.6 - 14.3 sec    INR 2.5     TYPE AND SCREEN    Collection Time: 11/06/22  7:01 PM   Result Value Ref Range    Crossmatch expiration date 11/09/2022,2359     ABO/Rh O POSITIVE     Antibody Screen NEG        I have personally reviewed imaging studies showing:  No results found. Echocardiogram:  04/12/22    TRANSTHORACIC ECHOCARDIOGRAM (TTE) COMPLETE (CONTRAST/BUBBLE/3D PRN) 04/13/2022 12:40 PM, 04/13/2022 12:00 AM (Final)    Narrative  This is a summary report. The complete report is available in the patient's medical record.  If you cannot access the medical record, please contact the sending organization for a detailed fax or copy.      Left Ventricle: Left ventricle size is normal. Mildly increased wall thickness. Normal wall motion. Low normal left ventricular systolic function with a visually estimated EF of 50 - 55%. Aortic Valve: Mildly calcified cusp. Left Atrium: Left atrium is severely dilated. LA Vol Index is  51 ml/m2. Signed by: Ming Devine DO on 4/13/2022 12:40 PM, Signed by: Unknown Provider Result on 4/13/2022 12:00 AM        Orders Placed This Encounter   Medications    pantoprazole (PROTONIX) 80 mg in sodium chloride (PF) 0.9 % 20 mL injection    phytonadione (ADULT) (VITAMIN K) 10 mg in sodium chloride 0.9 % 100 mL IVPB    sodium chloride flush 0.9 % injection 5-40 mL    sodium chloride flush 0.9 % injection 5-40 mL    0.9 % sodium chloride infusion    OR Linked Order Group     ondansetron (ZOFRAN-ODT) disintegrating tablet 4 mg     ondansetron (ZOFRAN) injection 4 mg    OR Linked Order Group     acetaminophen (TYLENOL) tablet 650 mg     acetaminophen (TYLENOL) suppository 650 mg    DISCONTD: pantoprazole (PROTONIX) 40 mg in sodium chloride (PF) 0.9 % 10 mL injection    pantoprazole (PROTONIX) 40 mg in sodium chloride (PF) 0.9 % 10 mL injection    FOLLOWED BY Linked Order Group     prothrombin complex concentrate (human) (KCENTRA) infusion 1,000 Units     0.9 % sodium chloride infusion         Signed:  Jammie Hammer DO, DO    Part of this note may have been written by using a voice dictation software. The note has been proof read but may still contain some grammatical/other typographical errors.

## 2022-11-07 NOTE — ED NOTES
TRANSFER - OUT REPORT:    Verbal report given to Suzette Garner RN on [place]  being transferred to Med/Surg [facility] for routine progression of patient care       Report consisted of patient's Situation, Background, Assessment and   Recommendations(SBAR). Information from the following report(s) Nurse Handoff Report, ED Encounter Summary, and ED SBAR was reviewed with the receiving nurse. Lines:   Peripheral IV 11/06/22 Right Antecubital (Active)        Opportunity for questions and clarification was provided.       Patient transported with:  Monitor      Jhonny Polanco RN  11/06/22 [id number]

## 2022-11-07 NOTE — PROGRESS NOTES
TRANSFER - OUT REPORT:           Verbal report given to Primary RN(name) on [place]  being transferred to [facility](unit) for routine progression of patient care              Report consisted of patients Situation, Background, Assessment and      Recommendations(SBAR). Information from the following report(s) SBAR, Procedure Summary, and MAR was reviewed with the receiving nurse. Opportunity for questions and clarification was provided. Conscious Sedation:    100 Mcg of Fentanyl administered   2 Mg of Versed administered     Pt tolerated procedure well.          Peripheral Intravenous Line:   Peripheral IV 11/06/22 Right Antecubital (Active)   Site Assessment Clean, dry & intact 11/07/22 0725   Line Status Infusing 11/07/22 0725   Line Care Connections checked and tightened 11/07/22 0725   Phlebitis Assessment No symptoms 11/07/22 0725   Infiltration Assessment 0 11/07/22 0725   Alcohol Cap Used Yes 11/07/22 0725   Dressing Status Clean, dry & intact 11/07/22 0725   Dressing Type Transparent 11/07/22 0725       VITALS:  BP (!) 124/58   Pulse 70   Temp 97.1 °F (36.2 °C) (Temporal)   Resp 16   Ht 5' 6\" (1.676 m)   Wt 184 lb (83.5 kg)   SpO2 97%   BMI 29.70 kg/m²

## 2022-11-07 NOTE — PROGRESS NOTES
Resting in bed. IVF infusing. Had x2 dark maroon/bloody BMs since arrival to unit. Denies any n/v/pain at this time. Hourly rounds completed, all needs met this shift. Bed in L/L with call light in reach. Report to be given to dayshift nurse.         NPO since arrival.

## 2022-11-07 NOTE — PROGRESS NOTES
TRANSFER - IN REPORT:    Verbal report received from Radha Fox, [facility] on [place]  being received from ED for routine progression of patient care      Report consisted of patient's Situation, Background, Assessment and   Recommendations(SBAR). Information from the following report(s) ED Encounter Summary, ED SBAR, and Recent Results was reviewed with the receiving nurse. Opportunity for questions and clarification was provided. Assessment completed upon patient's arrival to unit and care assumed.

## 2022-11-07 NOTE — PROGRESS NOTES
Hospitalist Progress Note   Admit Date:  2022  6:51 PM   Name:  Zak Rosado   Age:  77 y.o. Sex:  female  :  1945   MRN:  [id number]   Room:  [facility]/    Presenting Complaint: GI Bleeding     Reason(s) for Admission: Acute upper gastrointestinal hemorrhage [K92.2]  UGIB (upper gastrointestinal bleed) [facility] Course:   Zak Rosado is a 77 y.o. 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 Also had maroon red loose stools. Has lightheadedness, dizziness with position change but if lying supine she is stable. She is on warfarin and ASA. Denies use of NSAIDs. No prior h/o GIB. Denies abdominal pain. Normal cologuard on 22. Rec'd plasma after open heart surgery 20 years ago and had allergic rxn. Both daughter and patient unsure what type of plasma. BP soft while en route 100/50s with positive orthostatics. In ED, patient's MAP 68-70. Fecal occult positive. Scr 0.9 BUN 42 hgb 9.2  (BL 11-12)PT 27.3 INR 2.5. she was loaded with protonix 80 mg IV and both hospitalist & GI were consulted for admission. Subjective & 24hr Events (22): Patient seen after IR procedure. She is currently stable and without complaint. Hg stable overnight      Assessment & Plan:   Acute upper GI bleed   Underwent emergent EGD( ) and had endoclips x2 place in distal antrum of stomach.   Then underwent embolization by IR ()  Hg stable  IV PPI  Appreciate GI's assistance    Await clearance to restart Coumadin    Mechanical valve  Due to chronic bacterial endocarditis  Appreciate Cardiology recs  Heparin drip started and recommend full anticoagulation if INR < 2.5 to prevent CVA    Bioprosthetic aortic valve  Can hold ASA for now but will need to be restarted in the future per Cardiology. Likely as an outpatient    Permanent afib  Rate controled (has pacemaker)  On Heparin drip    Secondary hypercoagulable state  Will need to be restarted on Coumadin due to Mechanical Mitral Valve    Chronic bacterial endocarditis  Cefadroxil not on formulary she will be continued on Keflex    Chronic HFrEF  Compensated  Holding ARB given soft blood pressure. Will continue CoReg    Hypothyroid  Synthroid      Anticipated discharge needs:      Pending hospital course    Diet:  ADULT DIET; Clear Liquid  DVT PPx: Heparin drip  Code status: Full Code    Hospital Problems:  Principal Problem:    Acute upper gastrointestinal hemorrhage  Active Problems:    ABLA (acute blood loss anemia)    Warfarin anticoagulation    Hypercoagulable state, secondary ([address]    Chronic bacterial endocarditis    History of mitral valve replacement with mechanical valve    Cardiac pacemaker    History of aortic valve replacement with bioprosthetic valve    Chronic systolic heart failure (HCC)    Permanent atrial fibrillation ([address]  Resolved Problems:    * No resolved hospital problems.  *      Objective:   Patient Vitals for the past 24 hrs:   Temp Pulse Resp BP SpO2   11/07/22 1117 98.1 °F (36.7 °C) 71 16 (!) 124/58 95 %   11/07/22 1036 -- 68 16 (!) 107/51 99 %   11/07/22 1021 -- 68 16 (!) 103/51 96 %   11/07/22 1005 -- 70 16 (!) 107/55 95 %   11/07/22 0951 -- 69 16 (!) 91/45 98 %   11/07/22 0936 -- 70 16 (!) 124/58 97 %   11/07/22 0929 -- 70 16 (!) 117/55 100 %   11/07/22 0924 -- 70 13 (!) 112/48 100 %   11/07/22 0919 -- 70 14 (!) 113/47 100 %   11/07/22 0914 -- 70 13 (!) 118/53 100 %   11/07/22 0908 -- 70 12 (!) 113/52 100 %   11/07/22 0903 -- 70 12 (!) 115/50 100 %   11/07/22 0858 -- 70 12 (!) 107/53 100 %   11/07/22 0853 -- 70 10 (!) 107/49 100 %   11/07/22 0848 -- 70 12 (!) 100/48 100 %   11/07/22 0844 -- 70 14 (!) 96/48 100 %   11/07/22 0839 -- 70 14 (!) 113/47 100 %   11/07/22 0835 -- 70 16 (!) 116/50 100 %   11/07/22 0744 97.1 °F (36.2 °C) 70 16 (!) 115/54 96 %   11/07/22 0621 -- 69 -- -- --   11/07/22 0402 97.9 °F (36.6 °C) 71 18 (!) 104/54 93 %   11/07/22 0043 -- 69 -- -- --   11/07/22 0015 98.4 °F (36.9 °C) 70 16 (!) 109/53 100 %   11/06/22 2353 -- 69 16 (!) 98/50 99 %   11/06/22 2348 -- 71 16 (!) 105/53 99 %   11/06/22 2345 97.3 °F (36.3 °C) 70 17 (!) 112/52 99 %   11/06/22 2344 97.7 °F (36.5 °C) 70 16 (!) 112/52 99 %   11/06/22 2230 -- 70 18 -- 95 %   11/06/22 2215 -- 72 19 -- 97 %   11/06/22 2200 -- 71 20 -- 97 %   11/06/22 2145 -- 73 21 -- 96 %   11/06/22 2130 -- 73 16 -- 98 %   11/06/22 2115 -- 70 12 124/66 98 %   11/06/22 2100 -- 71 15 (!) 138/42 100 %   11/06/22 2045 -- 70 17 (!) 126/51 97 %   11/06/22 2030 -- 71 16 (!) 120/59 98 %   11/06/22 2015 -- 73 22 116/64 95 %   11/06/22 2000 -- 73 18 122/66 97 %   11/06/22 1945 -- 72 18 128/82 95 %   11/06/22 1930 -- 70 17 (!) 112/54 96 %   11/06/22 1915 -- 70 18 -- 97 %   11/06/22 1853 -- 72 17 (!) 104/52 --   11/06/22 1837 98.2 °F (36.8 °C) 72 16 (!) 112/46 --       Oxygen Therapy  SpO2: 95 %  Pulse via Oximetry: 70 beats per minute  Pulse Oximeter Device Mode: Continuous  Pulse Oximeter Device Location: Right, Finger  O2 Device: None (Room air)  O2 Flow Rate (L/min): 4 L/min    Estimated body mass index is 29.7 kg/m² as calculated from the following:    Height as of this encounter: 5' 6\" (1.676 m). Weight as of this encounter: 184 lb (83.5 kg). Intake/Output Summary (Last 24 hours) at 11/7/2022 1505  Last data filed at 11/7/2022 0439  Gross per 24 hour   Intake --   Output 350 ml   Net -350 ml         Physical Exam:     Blood pressure (!) 124/58, pulse 71, temperature 98.1 °F (36.7 °C), temperature source Oral, resp.  rate 16, height 5' 6\" (1.676 m), weight 184 lb (83.5 kg), SpO2 95 %. General:    Well nourished. Head:  Normocephalic, atraumatic  Eyes:  Sclerae appear normal.  Pupils equally round. ENT:  Nares appear normal, no drainage. Moist oral mucosa  Neck:  No restricted ROM. Trachea midline   CV:   RRR. No m/r/g. No jugular venous distension. Lungs:   CTAB. No wheezing, rhonchi, or rales. Symmetric expansion. Abdomen: Bowel sounds present. Soft, nontender, nondistended. Extremities: No cyanosis or clubbing. No edema  Skin:     No rashes and normal coloration. Warm and dry. Neuro:  CN II-XII grossly intact. Sensation intact. A&Ox3  Psych:  Normal mood and affect.       I have personally reviewed labs and tests showing:  Recent Labs:  Recent Results (from the past 48 hour(s))   EKG 12 Lead (Select if Upper Abdominal Pain or symptoms of SOB,or Tachycardia)    Collection Time: 11/06/22  6:42 PM   Result Value Ref Range    Ventricular Rate 72 BPM    Atrial Rate 0 BPM    QRS Duration 132 ms    Q-T Interval 403 ms    QTc Calculation (Bazett) 441 ms    R Axis 120 degrees    T Axis 9 degrees    Diagnosis Electronic ventricular pacemaker    CBC with Auto Differential    Collection Time: 11/06/22  7:01 PM   Result Value Ref Range    WBC 8.9 4.3 - 11.1 K/uL    RBC 3.00 (L) 4.05 - 5.2 M/uL    Hemoglobin 9.2 (L) 11.7 - 15.4 g/dL    Hematocrit 28.3 (L) 35.8 - 46.3 %    MCV 94.3 82 - 102 FL    MCH 30.7 26.1 - 32.9 PG    MCHC 32.5 31.4 - 35.0 g/dL    RDW 12.8 11.9 - 14.6 %    Platelets 698 724 - 819 K/uL    MPV 10.8 9.4 - 12.3 FL    nRBC 0.00 0.0 - 0.2 K/uL    Differential Type AUTOMATED      Seg Neutrophils 84 (H) 43 - 78 %    Lymphocytes 10 (L) 13 - 44 %    Monocytes 5 4.0 - 12.0 %    Eosinophils % 1 0.5 - 7.8 %    Basophils 0 0.0 - 2.0 %    Immature Granulocytes 0 0.0 - 5.0 %    Segs Absolute 7.5 1.7 - 8.2 K/UL    Absolute Lymph # 0.9 0.5 - 4.6 K/UL    Absolute Mono # 0.5 0.1 - 1.3 K/UL    Absolute Eos # 0.0 0.0 - 0.8 K/UL Basophils Absolute 0.0 0.0 - 0.2 K/UL    Absolute Immature Granulocyte 0.0 0.0 - 0.5 K/UL   CMP    Collection Time: 11/06/22  7:01 PM   Result Value Ref Range    Sodium 140 133 - 143 mmol/L    Potassium 4.4 3.5 - 5.1 mmol/L    Chloride 112 (H) 101 - 110 mmol/L    CO2 27 21 - 32 mmol/L    Anion Gap 1 (L) 2 - 11 mmol/L    Glucose 121 (H) 65 - 100 mg/dL    BUN 42 (H) 8 - 23 MG/DL    Creatinine 0.90 0.6 - 1.0 MG/DL    Est, Glom Filt Rate >60 >60 ml/min/1.73m2    Calcium 9.0 8.3 - 10.4 MG/DL    Total Bilirubin 0.5 0.2 - 1.1 MG/DL    ALT 17 12 - 65 U/L    AST 15 15 - 37 U/L    Alk Phosphatase 76 50 - 136 U/L    Total Protein 6.6 6.3 - 8.2 g/dL    Albumin 3.6 3.2 - 4.6 g/dL    Globulin 3.0 2.8 - 4.5 g/dL    Albumin/Globulin Ratio 1.2 0.4 - 1.6     Protime-INR    Collection Time: 11/06/22  7:01 PM   Result Value Ref Range    Protime 27.3 (H) 12.6 - 14.3 sec    INR 2.5     TYPE AND SCREEN    Collection Time: 11/06/22  7:01 PM   Result Value Ref Range    Crossmatch expiration date 11/09/2022,2359     ABO/Rh O POSITIVE     Antibody Screen NEG    Protime-INR    Collection Time: 11/07/22  1:43 AM   Result Value Ref Range    Protime 16.7 (H) 12.6 - 14.3 sec    INR 1.3     CBC    Collection Time: 11/07/22  1:43 AM   Result Value Ref Range    WBC 9.1 4.3 - 11.1 K/uL    RBC 2.40 (L) 4.05 - 5.2 M/uL    Hemoglobin 7.4 (L) 11.7 - 15.4 g/dL    Hematocrit 23.0 (L) 35.8 - 46.3 %    MCV 95.8 82 - 102 FL    MCH 30.8 26.1 - 32.9 PG    MCHC 32.2 31.4 - 35.0 g/dL    RDW 12.7 11.9 - 14.6 %    Platelets 257 (L) 062 - 450 K/uL    MPV 10.8 9.4 - 12.3 FL    nRBC 0.00 0.0 - 0.2 K/uL   Protime-INR    Collection Time: 11/07/22  5:24 AM   Result Value Ref Range    Protime 15.8 (H) 12.6 - 14.3 sec    INR 1.2     APTT    Collection Time: 11/07/22  5:24 AM   Result Value Ref Range    PTT 29.1 24.5 - 34.2 SEC   Comprehensive Metabolic Panel    Collection Time: 11/07/22  5:24 AM   Result Value Ref Range    Sodium 143 133 - 143 mmol/L    Potassium 4.4 3.5 - 5.1 mmol/L    Chloride 114 (H) 101 - 110 mmol/L    CO2 25 21 - 32 mmol/L    Anion Gap 4 2 - 11 mmol/L    Glucose 157 (H) 65 - 100 mg/dL    BUN 37 (H) 8 - 23 MG/DL    Creatinine 0.80 0.6 - 1.0 MG/DL    Est, Glom Filt Rate >60 >60 ml/min/1.73m2    Calcium 8.6 8.3 - 10.4 MG/DL    Total Bilirubin 0.6 0.2 - 1.1 MG/DL    ALT 15 12 - 65 U/L    AST 11 (L) 15 - 37 U/L    Alk Phosphatase 62 50 - 136 U/L    Total Protein 5.3 (L) 6.3 - 8.2 g/dL    Albumin 3.1 (L) 3.2 - 4.6 g/dL    Globulin 2.2 (L) 2.8 - 4.5 g/dL    Albumin/Globulin Ratio 1.4 0.4 - 1.6     Magnesium    Collection Time: 11/07/22  5:24 AM   Result Value Ref Range    Magnesium 2.2 1.8 - 2.4 mg/dL   CBC with Auto Differential    Collection Time: 11/07/22  5:24 AM   Result Value Ref Range    WBC 9.0 4.3 - 11.1 K/uL    RBC 2.46 (L) 4.05 - 5.2 M/uL    Hemoglobin 7.5 (L) 11.7 - 15.4 g/dL    Hematocrit 23.0 (L) 35.8 - 46.3 %    MCV 93.5 82 - 102 FL    MCH 30.5 26.1 - 32.9 PG    MCHC 32.6 31.4 - 35.0 g/dL    RDW 12.7 11.9 - 14.6 %    Platelets 395 (L) 559 - 450 K/uL    MPV 10.3 9.4 - 12.3 FL    nRBC 0.00 0.0 - 0.2 K/uL    Differential Type AUTOMATED      Seg Neutrophils 91 (H) 43 - 78 %    Lymphocytes 8 (L) 13 - 44 %    Monocytes 1 (L) 4.0 - 12.0 %    Eosinophils % 0 (L) 0.5 - 7.8 %    Basophils 0 0.0 - 2.0 %    Immature Granulocytes 0 0.0 - 5.0 %    Segs Absolute 8.2 1.7 - 8.2 K/UL    Absolute Lymph # 0.7 0.5 - 4.6 K/UL    Absolute Mono # 0.1 0.1 - 1.3 K/UL    Absolute Eos # 0.0 0.0 - 0.8 K/UL    Basophils Absolute 0.0 0.0 - 0.2 K/UL    Absolute Immature Granulocyte 0.0 0.0 - 0.5 K/UL   Lactic Acid    Collection Time: 11/07/22  5:24 AM   Result Value Ref Range    Lactic Acid, Plasma 0.9 0.4 - 2.0 MMOL/L   Hemoglobin and Hematocrit    Collection Time: 11/07/22  2:27 PM   Result Value Ref Range    Hemoglobin 6.9 (LL) 11.7 - 15.4 g/dL    Hematocrit 21.3 (L) 35.8 - 46.3 %       I have personally reviewed imaging studies showing:   Other Studies:  IR EMBOLIZATION HEMORRHAGE Final Result   1. Upper mesenteric arteriogram as above. The gastroduodenal artery was   embolized as this feeds the area of concern and the endoscopic clip. Plan:  3 hours bedrest. Continue to monitor hemoglobin. Current Meds:  Current Facility-Administered Medications   Medication Dose Route Frequency    heparin (porcine) injection 4,000 Units  4,000 Units IntraVENous PRN    heparin (porcine) injection 2,000 Units  2,000 Units IntraVENous PRN    heparin 25,000 units in dextrose 5% 250 mL (premix) infusion  5-30 Units/kg/hr IntraVENous Continuous    sodium chloride flush 0.9 % injection 5-40 mL  5-40 mL IntraVENous 2 times per day    sodium chloride flush 0.9 % injection 5-40 mL  5-40 mL IntraVENous PRN    0.9 % sodium chloride infusion   IntraVENous PRN    ondansetron (ZOFRAN-ODT) disintegrating tablet 4 mg  4 mg Oral Q8H PRN    Or    ondansetron (ZOFRAN) injection 4 mg  4 mg IntraVENous Q6H PRN    acetaminophen (TYLENOL) tablet 650 mg  650 mg Oral Q6H PRN    Or    acetaminophen (TYLENOL) suppository 650 mg  650 mg Rectal Q6H PRN    pantoprazole (PROTONIX) 40 mg in sodium chloride (PF) 0.9 % 10 mL injection  40 mg IntraVENous Q12H    cephALEXin (KEFLEX) capsule 500 mg  500 mg Oral 4 times per day    levothyroxine (SYNTHROID) tablet 50 mcg  50 mcg Oral Daily    carvedilol (COREG) tablet 3.125 mg  3.125 mg Oral BID WC    lactated ringers infusion   IntraVENous Continuous       Signed:  Chinmay Alfaro MD    Part of this note may have been written by using a voice dictation software. The note has been proof read but may still contain some grammatical/other typographical errors.

## 2022-11-07 NOTE — CONSENT
Informed Consent for Blood Component Transfusion Note    I have discussed with the patient and daughter the rationale for blood component transfusion; its benefits in treating or preventing fatigue, organ damage, or death; and its risk which includes mild transfusion reactions, rare risk of blood borne infection, or more serious but rare reactions. I have discussed the alternatives to transfusion, including the risk and consequences of not receiving transfusion. The patient and daughter had an opportunity to ask questions and had agreed to proceed with transfusion of blood components.     Electronically signed by Alexandr Rabago DO, DO on 11/6/22 at 10:23 PM EST

## 2022-11-07 NOTE — ANESTHESIA POSTPROCEDURE EVALUATION
Department of Anesthesiology  Postprocedure Note    Patient: Chantelle Monzon  MRN: [id number]  YOB: 1945  Date of evaluation: 11/6/2022      Procedure Summary     Date: 11/06/22 Room / Location: [facility] ENDO FLOURO 1 / [facility] ENDOSCOPY    Anesthesia Start: 2247 Anesthesia Stop: 2344    Procedure: EGD CONTROL HEMORRHAGE/clipping (Upper GI Region) Diagnosis:       Gastrointestinal hemorrhage with hematemesis      (Gastrointestinal hemorrhage with hematemesis [K92.0])    Surgeons: Dee Hunt MD Responsible Provider: Glenna Menon MD    Anesthesia Type: General ASA Status: 3 - Emergent          Anesthesia Type: General    Aleksandr Phase I: Aleksandr Score: 9    Aleksandr Phase II:        Anesthesia Post Evaluation    Patient location during evaluation: PACU  Patient participation: complete - patient participated  Level of consciousness: awake and alert  Airway patency: patent  Nausea & Vomiting: no nausea and no vomiting  Complications: no  Cardiovascular status: hemodynamically stable  Respiratory status: acceptable, nonlabored ventilation and spontaneous ventilation  Hydration status: euvolemic  Comments: BP (!) 98/50   Pulse 69   Temp 97.3 °F (36.3 °C) (Temporal)   Resp 16   Ht 5' 6\" (1.676 m)   Wt 184 lb (83.5 kg)   SpO2 99%   BMI 29.70 kg/m²     Multimodal analgesia pain management approach

## 2022-11-07 NOTE — PRE SEDATION
Sedation Pre-Procedure Note    Patient Name: Moises Davila   YOB: 1945  Room/Bed: [facility]  Medical Record Number: [id number]  Date: 11/7/2022   Time: 8:17 AM       Indication: IR guided embolization for GI bleed    Consent: I have discussed with the patient and/or the patient representative the indication, alternatives, and the possible risks and/or complications of the planned procedure and the anesthesia methods. The patient and/or patient representative appear to understand and agree to proceed. Vital Signs:   Vitals:    11/07/22 0744   BP: (!) 115/54   Pulse: 70   Resp: 16   Temp: 97.1 °F (36.2 °C)   SpO2: 96%       Past Medical History:   has a past medical history of Abnormal Pap smear of cervix, Arrhythmia, Arthritis, Bacteremia, Cardiac pacemaker, Chronic atrial fibrillation ([address] Chronic renal failure, Chronic systolic heart failure ([address] Dyslipidemia, Endocarditis, Heart failure ([address] History of aortic valve replacement with bioprosthetic valve, History of prosthetic heart valve, Hx of bacterial endocarditis, Hypertension, Mechanical heart valve present, Menopause, Pacemaker, Permanent atrial fibrillation ([address] Rheumatic fever, Sleep apnea, and Tricuspid valve disorder. Past Surgical History:   has a past surgical history that includes pr cardiac surg procedure unlist; pacemaker; other surgical history; Aortic valve replacement (2009); Mitral valve replacement (2001); Tonsillectomy; and Upper gastrointestinal endoscopy (N/A, 11/6/2022).     Medications:   Scheduled Meds:    sodium chloride flush  5-40 mL IntraVENous 2 times per day    pantoprazole (PROTONIX) 40 mg injection  40 mg IntraVENous Q12H    cephALEXin  500 mg Oral 4 times per day    levothyroxine  50 mcg Oral Daily    carvedilol  3.125 mg Oral BID WC     Continuous Infusions:    sodium chloride      lactated ringers 125 mL/hr at 11/07/22 0055     PRN Meds: sodium chloride flush, sodium chloride, ondansetron **OR** ondansetron, acetaminophen **OR** acetaminophen  Home Meds:   Prior to Admission medications    Medication Sig Start Date End Date Taking? Authorizing Provider   potassium chloride (KLOR-CON) 10 MEQ extended release tablet Take 1 tablet by mouth daily 11/4/22   Delores Baltazar MD   furosemide (LASIX) 20 MG tablet Take 1 tablet by mouth daily TAKE ONE TABLET BY MOUTH ONE TIME DAILY 9/26/22   Delores Baltazar MD   losartan (COZAAR) 25 MG tablet Take 0.5 tablets by mouth daily TAKE ONE-HALF TABLET BY MOUTH ONE TIME DAILY 8/18/22   Delores Baltazar MD   diclofenac sodium (VOLTAREN) 1 % GEL Apply 2 g topically 4 times daily as needed for Pain  Patient not taking: Reported on 11/7/2022 8/15/22   Fidelina Castro MD   levothyroxine (SYNTHROID) 50 MCG tablet Take 1 tablet by mouth in the morning. TAKE ONE TABLET BY MOUTH ONE TIME DAILY BEFORE BREAKFAST. 8/4/22   Fidelina Castro MD   aspirin 81 MG EC tablet Take 81 mg by mouth daily    Ar Automatic Reconciliation   Biotin 2.5 MG CAPS Take 2 tablets by mouth 2 times daily    Ar Automatic Reconciliation   carvedilol (COREG) 12.5 MG tablet TAKE ONE TABLET BY MOUTH TWICE A DAY 10/15/21   Ar Automatic Reconciliation   cefadroxil (DURICEF) 500 MG capsule Take 500 mg by mouth 2 times daily 3/8/22   Ar Automatic Reconciliation   vitamin D (CHOLECALCIFEROL) 25 MCG (1000 UT) TABS tablet Take 1,000 Units by mouth daily  Patient not taking: Reported on 11/7/2022    Ar Automatic Reconciliation   warfarin (COUMADIN) 5 MG tablet TAKE ONE-HALF TO ONE TABLET BY MOUTH ONE TIME DAILY AS DIRECTED 4/19/21   Ar Automatic Reconciliation       Pre-Sedation Documentation and Exam:   I have personally completed a history, physical exam & review of systems for this patient (see notes). Vital signs have been reviewed (see flow sheet for vitals).     Mallampati Airway Assessment:  normal, dentition not prohibitive, Mallampati Class II - (soft palate, fauces & uvula are visible)    Prior History of Anesthesia Complications:   none    ASA Classification:  Class 3 - A patient with severe systemic disease that limits activity but is not incapacitating    Sedation/ Anesthesia Plan:   intravenous sedation    Medications Planned:   midazolam (Versed) intravenously and fentanyl intravenously    Patient is an appropriate candidate for plan of sedation: yes    Electronically signed by Jannetta Ahumada, PA on 11/7/2022 at 8:17 AM

## 2022-11-07 NOTE — PROGRESS NOTES
Pt is receiving blood at this time at a rate of 150ml/hr and pt is tolerating it well. He BP is trending down but she is not symptomatic. Provider notified. Will continue to monitor pt at the bedside during transfusion while monitoring vitals and assessing pt for s/s of reaction. Will update night nurse.

## 2022-11-07 NOTE — PERIOP NOTE
TRANSFER - OUT REPORT:    Verbal report given to Kyra Loco on [place]  being transferred to [facility] for routine post-op       Report consisted of patient's Situation, Background, Assessment and   Recommendations(SBAR). Information from the following report(s) Surgery Report, Intake/Output, MAR, and Recent Results was reviewed with the receiving nurse. Lines:   Peripheral IV 11/06/22 Right Antecubital (Active)   Site Assessment Clean, dry & intact 11/06/22 2345   Phlebitis Assessment No symptoms 11/06/22 2345   Infiltration Assessment 0 11/06/22 2345   Dressing Status Clean, dry & intact 11/06/22 2345   Dressing Type Transparent 11/06/22 2345        Opportunity for questions and clarification was provided.       Patient transported with:  O2 @ 2lpm and Registered Nurse

## 2022-11-07 NOTE — PROGRESS NOTES
Occupational Therapy Note:    Attempted to see patient this AM for occupational therapy evaluation session. Patient REBECCA to IR at time of attempt. Will follow and re-attempt as schedule permits/patient available.  Thank you,    Toro Giles, OT    Rehab Caseload Tracker

## 2022-11-07 NOTE — PROCEDURES
Endoscopic Gastroduodenoscopy Procedure Note    Indications:  Hematemesis, coagulopathy    Anesthesia/Sedation: MAC IV     Pre-Procedure Physical:    Current Facility-Administered Medications   Medication Dose Route Frequency    sodium chloride flush 0.9 % injection 5-40 mL  5-40 mL IntraVENous 2 times per day    sodium chloride flush 0.9 % injection 5-40 mL  5-40 mL IntraVENous PRN    0.9 % sodium chloride infusion   IntraVENous PRN    ondansetron (ZOFRAN-ODT) disintegrating tablet 4 mg  4 mg Oral Q8H PRN    Or    ondansetron (ZOFRAN) injection 4 mg  4 mg IntraVENous Q6H PRN    acetaminophen (TYLENOL) tablet 650 mg  650 mg Oral Q6H PRN    Or    acetaminophen (TYLENOL) suppository 650 mg  650 mg Rectal Q6H PRN    [START ON 11/7/2022] pantoprazole (PROTONIX) 40 mg in sodium chloride (PF) 0.9 % 10 mL injection  40 mg IntraVENous Q12H    [START ON 11/7/2022] cephALEXin (KEFLEX) capsule 500 mg  500 mg Oral 4 times per day    [START ON 11/7/2022] levothyroxine (SYNTHROID) tablet 50 mcg  50 mcg Oral Daily    [START ON 11/7/2022] carvedilol (COREG) tablet 3.125 mg  3.125 mg Oral BID WC     Current Outpatient Medications   Medication Sig    potassium chloride (KLOR-CON) 10 MEQ extended release tablet Take 1 tablet by mouth daily    furosemide (LASIX) 20 MG tablet Take 1 tablet by mouth daily TAKE ONE TABLET BY MOUTH ONE TIME DAILY    losartan (COZAAR) 25 MG tablet Take 0.5 tablets by mouth daily TAKE ONE-HALF TABLET BY MOUTH ONE TIME DAILY    diclofenac sodium (VOLTAREN) 1 % GEL Apply 2 g topically 4 times daily as needed for Pain    levothyroxine (SYNTHROID) 50 MCG tablet Take 1 tablet by mouth in the morning. TAKE ONE TABLET BY MOUTH ONE TIME DAILY BEFORE BREAKFAST.     aspirin 81 MG EC tablet Take 81 mg by mouth daily    Biotin 2.5 MG CAPS Take 2 tablets by mouth 2 times daily    carvedilol (COREG) 12.5 MG tablet TAKE ONE TABLET BY MOUTH TWICE A DAY    cefadroxil (DURICEF) 500 MG capsule Take 500 mg by mouth 2 times daily    vitamin D (CHOLECALCIFEROL) 25 MCG (1000 UT) TABS tablet Take 1,000 Units by mouth daily    warfarin (COUMADIN) 5 MG tablet TAKE ONE-HALF TO ONE TABLET BY MOUTH ONE TIME DAILY AS DIRECTED      Penicillins    Patient Vitals for the past 8 hrs:   BP Temp Temp src Pulse Resp SpO2 Height Weight   11/06/22 2015 116/64 -- -- 73 22 95 % -- --   11/06/22 2000 122/66 -- -- 73 18 97 % -- --   11/06/22 1945 128/82 -- -- 72 18 95 % -- --   11/06/22 1930 (!) 112/54 -- -- 70 17 96 % -- --   11/06/22 1915 -- -- -- 70 18 97 % -- --   11/06/22 1853 (!) 104/52 -- -- 72 17 -- -- --   11/06/22 1837 (!) 112/46 98.2 °F (36.8 °C) Oral 72 16 -- 5' 6\" (1.676 m) 184 lb (83.5 kg)       Exam      Airway: clear   Heart: paced rhythm, + murmur  Lungs: clear bilateral  Abdomen: soft, nontender, bowel sounds present and normal in all quads   Mental Status: awake, alert and oriented to person, place and time          Procedure Details     Informed consent was obtained for the procedure, including conscious sedation. Risks of pancreatitis, infection, perforation, hemorrhage, adverse drug reaction and aspiration were discussed. The EGD gastroscope was inserted into the mouth and advanced under direct vision to the second portion of the duodenum. A careful inspection was made as the gastroscope was withdrawn, including a retroflexed view of the proximal stomach; findings and interventions are described below. Appropriate photodocumentation was obtained. Findings:   Esophagus- Normal.  Stomach- Moderate amount of fresh blood in the stomach  A visible vessel with bleeding was seen in the mid to distal antrum slightly posteriorly. Initial endo clip did not take. A second endo clip placed at base,  but still with some bleeding. Another clip was placed with slowing of bleeding.   Duodenum- Normal.    Therapies: Endo clip    Specimens: None    Estimated Blood Loss: active bleeding           Complications:   None; patient tolerated the procedure well. Attending Attestation:  I performed the procedure. Impression:  Antral gastric bleeding/visible vessel. Endo clip technique used. Stilll with some bleeding. Recommendations:  Kcentra per IM. Discussed with Dr Roxane Swan. NPO. Frequent labs. PPIs IV . Discussed with IR Dr FATIMAH Giles who would like INR less than 2 to consider embolization.     Charo Barnett MD

## 2022-11-07 NOTE — PROGRESS NOTES
TRANSFER - OUT REPORT:           Verbal report given to MARY Ortiz(name) on [place]  being transferred to IR Recovery 3(unit) for routine post-op              Report consisted of patients Situation, Background, Assessment and      Recommendations(SBAR). Information from the following report(s) SBAR, Procedure Summary, and MAR was reviewed with the receiving nurse. Opportunity for questions and clarification was provided. Conscious Sedation:    100 Mcg of Fentanyl administered   2 Mg of Versed administered       Pt tolerated procedure well.          Peripheral Intravenous Line:   Peripheral IV 11/06/22 Right Antecubital (Active)   Site Assessment Clean, dry & intact 11/07/22 0725   Line Status Infusing 11/07/22 0725   Line Care Connections checked and tightened 11/07/22 0725   Phlebitis Assessment No symptoms 11/07/22 0725   Infiltration Assessment 0 11/07/22 0725   Alcohol Cap Used Yes 11/07/22 0725   Dressing Status Clean, dry & intact 11/07/22 0725   Dressing Type Transparent 11/07/22 0725       VITALS:  BP (!) 124/58   Pulse 70   Temp 97.1 °F (36.2 °C) (Temporal)   Resp 16   Ht 5' 6\" (1.676 m)   Wt 184 lb (83.5 kg)   SpO2 97%   BMI 29.70 kg/m²

## 2022-11-08 LAB
ANION GAP SERPL CALC-SCNC: 6 MMOL/L (ref 2–11)
BASOPHILS # BLD: 0 K/UL (ref 0–0.2)
BASOPHILS NFR BLD: 0 % (ref 0–2)
BUN SERPL-MCNC: 26 MG/DL (ref 8–23)
CALCIUM SERPL-MCNC: 8.4 MG/DL (ref 8.3–10.4)
CHLORIDE SERPL-SCNC: 113 MMOL/L (ref 101–110)
CO2 SERPL-SCNC: 25 MMOL/L (ref 21–32)
CREAT SERPL-MCNC: 1 MG/DL (ref 0.6–1)
DIFFERENTIAL METHOD BLD: ABNORMAL
EOSINOPHIL # BLD: 0.1 K/UL (ref 0–0.8)
EOSINOPHIL NFR BLD: 1 % (ref 0.5–7.8)
ERYTHROCYTE [DISTWIDTH] IN BLOOD BY AUTOMATED COUNT: 13.7 % (ref 11.9–14.6)
GLUCOSE SERPL-MCNC: 85 MG/DL (ref 65–100)
HCT VFR BLD AUTO: 21.8 % (ref 35.8–46.3)
HCT VFR BLD AUTO: 22.1 % (ref 35.8–46.3)
HCT VFR BLD AUTO: 25.1 % (ref 35.8–46.3)
HGB BLD-MCNC: 7.1 G/DL (ref 11.7–15.4)
HGB BLD-MCNC: 7.1 G/DL (ref 11.7–15.4)
HGB BLD-MCNC: 8.3 G/DL (ref 11.7–15.4)
HISTORY CHECK: NORMAL
IMM GRANULOCYTES # BLD AUTO: 0 K/UL (ref 0–0.5)
IMM GRANULOCYTES NFR BLD AUTO: 1 % (ref 0–5)
LYMPHOCYTES # BLD: 1.6 K/UL (ref 0.5–4.6)
LYMPHOCYTES NFR BLD: 20 % (ref 13–44)
MCH RBC QN AUTO: 30.2 PG (ref 26.1–32.9)
MCHC RBC AUTO-ENTMCNC: 32.1 G/DL (ref 31.4–35)
MCV RBC AUTO: 94 FL (ref 82–102)
MONOCYTES # BLD: 0.5 K/UL (ref 0.1–1.3)
MONOCYTES NFR BLD: 6 % (ref 4–12)
NEUTS SEG # BLD: 6 K/UL (ref 1.7–8.2)
NEUTS SEG NFR BLD: 72 % (ref 43–78)
NRBC # BLD: 0 K/UL (ref 0–0.2)
PLATELET # BLD AUTO: 119 K/UL (ref 150–450)
PMV BLD AUTO: 11.3 FL (ref 9.4–12.3)
POTASSIUM SERPL-SCNC: 4 MMOL/L (ref 3.5–5.1)
RBC # BLD AUTO: 2.35 M/UL (ref 4.05–5.2)
SODIUM SERPL-SCNC: 144 MMOL/L (ref 133–143)
UFH PPP CHRO-ACNC: 0.38 IU/ML (ref 0.3–0.7)
WBC # BLD AUTO: 8.2 K/UL (ref 4.3–11.1)

## 2022-11-08 PROCEDURE — 99232 SBSQ HOSP IP/OBS MODERATE 35: CPT | Performed by: INTERNAL MEDICINE

## 2022-11-08 PROCEDURE — A4216 STERILE WATER/SALINE, 10 ML: HCPCS | Performed by: FAMILY MEDICINE

## 2022-11-08 PROCEDURE — 97535 SELF CARE MNGMENT TRAINING: CPT

## 2022-11-08 PROCEDURE — 97530 THERAPEUTIC ACTIVITIES: CPT

## 2022-11-08 PROCEDURE — C9113 INJ PANTOPRAZOLE SODIUM, VIA: HCPCS | Performed by: FAMILY MEDICINE

## 2022-11-08 PROCEDURE — 85018 HEMOGLOBIN: CPT

## 2022-11-08 PROCEDURE — 6370000000 HC RX 637 (ALT 250 FOR IP): Performed by: FAMILY MEDICINE

## 2022-11-08 PROCEDURE — 6360000002 HC RX W HCPCS: Performed by: NURSE PRACTITIONER

## 2022-11-08 PROCEDURE — 80048 BASIC METABOLIC PNL TOTAL CA: CPT

## 2022-11-08 PROCEDURE — 36415 COLL VENOUS BLD VENIPUNCTURE: CPT

## 2022-11-08 PROCEDURE — 85025 COMPLETE CBC W/AUTO DIFF WBC: CPT

## 2022-11-08 PROCEDURE — P9016 RBC LEUKOCYTES REDUCED: HCPCS

## 2022-11-08 PROCEDURE — 2580000003 HC RX 258: Performed by: FAMILY MEDICINE

## 2022-11-08 PROCEDURE — 36430 TRANSFUSION BLD/BLD COMPNT: CPT

## 2022-11-08 PROCEDURE — 85520 HEPARIN ASSAY: CPT

## 2022-11-08 PROCEDURE — 6360000002 HC RX W HCPCS: Performed by: FAMILY MEDICINE

## 2022-11-08 PROCEDURE — 97161 PT EVAL LOW COMPLEX 20 MIN: CPT

## 2022-11-08 PROCEDURE — 2580000003 HC RX 258: Performed by: ANESTHESIOLOGY

## 2022-11-08 PROCEDURE — 97165 OT EVAL LOW COMPLEX 30 MIN: CPT

## 2022-11-08 PROCEDURE — 85014 HEMATOCRIT: CPT

## 2022-11-08 PROCEDURE — 1100000000 HC RM PRIVATE

## 2022-11-08 RX ORDER — SODIUM CHLORIDE 9 MG/ML
INJECTION, SOLUTION INTRAVENOUS PRN
Status: DISCONTINUED | OUTPATIENT
Start: 2022-11-08 | End: 2022-11-09

## 2022-11-08 RX ADMIN — CEPHALEXIN 500 MG: 500 CAPSULE ORAL at 12:34

## 2022-11-08 RX ADMIN — SODIUM CHLORIDE 40 MG: 9 INJECTION INTRAMUSCULAR; INTRAVENOUS; SUBCUTANEOUS at 05:48

## 2022-11-08 RX ADMIN — HEPARIN SODIUM AND DEXTROSE 11 UNITS/KG/HR: 10000; 5 INJECTION INTRAVENOUS at 17:17

## 2022-11-08 RX ADMIN — SODIUM CHLORIDE, PRESERVATIVE FREE 10 ML: 5 INJECTION INTRAVENOUS at 09:19

## 2022-11-08 RX ADMIN — SODIUM CHLORIDE, PRESERVATIVE FREE 10 ML: 5 INJECTION INTRAVENOUS at 21:43

## 2022-11-08 RX ADMIN — SODIUM CHLORIDE, SODIUM LACTATE, POTASSIUM CHLORIDE, AND CALCIUM CHLORIDE: 600; 310; 30; 20 INJECTION, SOLUTION INTRAVENOUS at 05:50

## 2022-11-08 RX ADMIN — CEPHALEXIN 500 MG: 500 CAPSULE ORAL at 23:50

## 2022-11-08 RX ADMIN — CEPHALEXIN 500 MG: 500 CAPSULE ORAL at 17:16

## 2022-11-08 RX ADMIN — CEPHALEXIN 500 MG: 500 CAPSULE ORAL at 05:48

## 2022-11-08 RX ADMIN — LEVOTHYROXINE SODIUM 50 MCG: 0.05 TABLET ORAL at 05:48

## 2022-11-08 RX ADMIN — SODIUM CHLORIDE 40 MG: 9 INJECTION INTRAMUSCULAR; INTRAVENOUS; SUBCUTANEOUS at 18:25

## 2022-11-08 ASSESSMENT — PAIN SCALES - GENERAL: PAINLEVEL_OUTOF10: 0

## 2022-11-08 NOTE — PROGRESS NOTES
[facility] Progress Note         Admit Date:  11/6/2022    Today's Date:  11/8/2022    CC:  GI bleed    Subjective:     Feels well. Denies pain. No melena or BRBPR. Hg drifting down. Getting 1 unit PRBCs    Medications:   Current Facility-Administered Medications   Medication Dose Route Frequency    0.9 % sodium chloride infusion   IntraVENous PRN    heparin (porcine) injection 4,000 Units  4,000 Units IntraVENous PRN    heparin (porcine) injection 2,000 Units  2,000 Units IntraVENous PRN    heparin 25,000 units in dextrose 5% 250 mL (premix) infusion  5-30 Units/kg/hr IntraVENous Continuous    0.9 % sodium chloride infusion   IntraVENous PRN    sodium chloride flush 0.9 % injection 5-40 mL  5-40 mL IntraVENous 2 times per day    sodium chloride flush 0.9 % injection 5-40 mL  5-40 mL IntraVENous PRN    0.9 % sodium chloride infusion   IntraVENous PRN    ondansetron (ZOFRAN-ODT) disintegrating tablet 4 mg  4 mg Oral Q8H PRN    Or    ondansetron (ZOFRAN) injection 4 mg  4 mg IntraVENous Q6H PRN    acetaminophen (TYLENOL) tablet 650 mg  650 mg Oral Q6H PRN    Or    acetaminophen (TYLENOL) suppository 650 mg  650 mg Rectal Q6H PRN    pantoprazole (PROTONIX) 40 mg in sodium chloride (PF) 0.9 % 10 mL injection  40 mg IntraVENous Q12H    cephALEXin (KEFLEX) capsule 500 mg  500 mg Oral 4 times per day    levothyroxine (SYNTHROID) tablet 50 mcg  50 mcg Oral Daily    [Held by provider] carvedilol (COREG) tablet 3.125 mg  3.125 mg Oral BID WC    lactated ringers infusion   IntraVENous Continuous       Review of Systems:  ROS was obtained, with pertinent positives as listed above. No chest pain or SOB.     Diet:  NPO    Objective:   Vitals:  BP (!) 104/58   Pulse 70   Temp 97.8 °F (36.6 °C) (Oral)   Resp 14   Ht 5' 6\" (1.676 m)   Wt 199 lb 8.3 oz (90.5 kg)   SpO2 93%   BMI 32.20 kg/m²   Intake/Output:  11/08 0701 - 11/08 1900  In: 240 [P.O.:240]  Out: -   11/06 1901 - 11/08 0700  In: 635.3 [P.O.:300]  Out: 350 [Urine:350]  Exam:  General appearance: alert, cooperative, no distress  Lungs: Normal chest expansion. Normal respiratory effort. Abdomen: soft, non-distended. Neuro:  alert and oriented    Data Review (Labs):    Recent Labs     11/06/22  1901 11/07/22  0143 11/07/22  0524 11/07/22  1427 11/07/22 2012 11/07/22  2239 11/08/22  0446 11/08/22  0824   WBC 8.9 9.1 9.0  --   --   --  8.2  --    HGB 9.2* 7.4* 7.5* 6.9* 7.8* 8.0* 7.1* 7.1*   HCT 28.3* 23.0* 23.0* 21.3* 23.7* 24.3* 22.1* 21.8*    135* 128*  --   --   --  119*  --    MCV 94.3 95.8 93.5  --   --   --  94.0  --      --  143  --   --   --  144*  --    K 4.4  --  4.4  --   --   --  4.0  --    *  --  114*  --   --   --  113*  --    CO2 27  --  25  --   --   --  25  --    BUN 42*  --  37*  --   --   --  26*  --    MG  --   --  2.2  --   --   --   --   --    AST 15  --  11*  --   --   --   --   --    ALT 17  --  15  --   --   --   --   --    INR 2.5 1.3 1.2  --   --   --   --   --    APTT  --   --  29.1  --   --   --   --   --        EGD 11/6/22 with Dr. Obi Jones for GI bleed Findings:   Esophagus- Normal.  Stomach- Moderate amount of fresh blood in the stomach  A visible vessel with bleeding was seen in the mid to distal antrum slightly posteriorly. Initial endo clip did not take. A second endo clip placed at base,  but still with some bleeding. Another clip was placed with slowing of bleeding. Duodenum- Normal.  Therapies: Endo clip  Specimens: None  Estimated Blood Loss: active bleeding  IMPRESSION: Antral gastric bleeding/visible vessel. Endo clip technique used. Stilll with some bleeding. Recommendations:  Kcentra per IM. Discussed with Dr Nohemy Sadler. NPO. Frequent labs. PPIs IV . Discussed with IR Dr FATIMAH Giles who would like INR less than 2 to consider embolization.     Arteriogram and Embolization with Dr. Vera Duran 11/7/22 for GI bleed  Findings: gda successfully embolized    Assessment: Principal Problem:    Acute upper gastrointestinal hemorrhage  Active Problems:    ABLA (acute blood loss anemia)    Warfarin anticoagulation    Hypercoagulable state, secondary (HCC)    Chronic bacterial endocarditis    History of mitral valve replacement with mechanical valve    Cardiac pacemaker    History of aortic valve replacement with bioprosthetic valve    Chronic systolic heart failure (HCC)    Permanent atrial fibrillation ([address]  Resolved Problems:    * No resolved hospital problems. *      77 y.o. female with PMH significant for COVID early 2022, chronic bacterial endocarditis on lifelong Cefadoxil, On chronic Warfarin for Afib and hx mechanical MVR, hx Bovine AVR, chronic CHF (followed by Dr. Shea Morris), pacemaker in situ- seen in GI consult 11/6 for GIB after pt presented with hematemesis and bloody BMS. Cologuard 9/2022 was negative. She had just started taking Prilosec for dyspeptic symptoms. In the ER her hgb was 9.2 from Hgb 11.2 in 7/2022. INR was 2.5. Received Kcentra/IV Vit K for reversal of warfarin. S/p EGD 11/6 as outlined above and IR arteriogram with GDA successfully embolized morning of 11/7.        Plan:     Wish Ms Roger Benítez tomorrow, 11/9  Follow Hg  IV PPI  Continue clears  If no explanation for downward Hg drift, CT for retroperitoneal hematoma    Ravindra Ramos MD  [facility], [place]

## 2022-11-08 NOTE — PROGRESS NOTES
[facility] PROGRESS NOTE           11/8/2022 9:37 AM    Admit Date: 11/6/2022    Subjective:   Patient reports no further hematemesis or melena or bright red blood per rectum overnight. Denies chest pain, palpitations, chest pressure, abdominal pain, nausea, vomiting or leg swelling. No other complaints at this time. ROS:  Cardiovascular:  As noted above    Objective:      Vitals:    11/08/22 0427 11/08/22 0600 11/08/22 0804 11/08/22 0806   BP:   (!) 107/49 (!) 120/50   Pulse:  69 70 71   Resp:   22    Temp:   97.3 °F (36.3 °C)    TempSrc:   Oral    SpO2:   98% 97%   Weight: 199 lb 8.3 oz (90.5 kg)      Height:         Physical Exam:  General-No Acute Distress, awake, alert   Neck- supple, no JVD  CV- regular rate and rhythm , faint systolic murmur RUSB   Lung- clear bilaterally without wheezes   Abd- soft, nontender, nondistended  Ext- no edema bilaterally. Skin- warm and dry, + pallor     Data Review:   Recent Labs     11/06/22  1901 11/07/22  0143 11/07/22  0524 11/07/22  1427 11/08/22  0446 11/08/22  0824     --  143  --   --   --    K 4.4  --  4.4  --   --   --    MG  --   --  2.2  --   --   --    BUN 42*  --  37*  --   --   --    WBC 8.9 9.1 9.0  --  8.2  --    HGB 9.2* 7.4* 7.5*   < > 7.1* 7.1*   HCT 28.3* 23.0* 23.0*   < > 22.1* 21.8*    135* 128*  --  119*  --    INR 2.5 1.3 1.2  --   --   --     < > = values in this interval not displayed. Assessment/Plan:     Active Hospital Problems    Acute upper gastrointestinal hemorrhage    ABLA (acute blood loss anemia)  -Hemoglobin 7.1 today, would recommend giving the patient a unit of blood.  -Closely monitor counts, per primary/GI. History of mitral valve replacement with mechanical valve   Warfarin anticoagulation  -Reasonable at this time to continue heparin drip, closely monitor blood counts. - Recommend transfusion of at least 1 unit PRBCs now.   If blood counts abruptly drop may need to hold heparin drip. Discussed this with the patient and family at bedside today. -Ideally continue heparin with therapeutic Xa levels while INR is less than 2.5. Look to resume warfarin in the near future once blood counts stabilize the patient improved clinically. Permanent atrial fibrillation [facility])    Cardiac pacemaker  -Anticoagulation plans as above. History of aortic valve replacement with bioprosthetic valve    Chronic bacterial endocarditis  -On chronic suppressive therapy. Essential hypertension  - Medications held due to hypotension. Likely due to hypovolemia/acute blood loss anemia. Recommendations communicated to primary team attending via [employer].      Cookie Aggarwal DO  11/8/2022 9:37 AM

## 2022-11-08 NOTE — PROGRESS NOTES
Eval and d/c only    OCCUPATIONAL THERAPY Initial Assessment and Discharge       OT Visit Days: 1  Acknowledge Orders  Time  OT Charge Capture  Rehab Caseload Tracker      Arben Ramos is a 77 y.o. female   PRIMARY DIAGNOSIS: Acute upper gastrointestinal hemorrhage  Acute upper gastrointestinal hemorrhage [K92.2]  UGIB (upper gastrointestinal bleed) [K92.2]  Procedure(s) (LRB):  EGD CONTROL HEMORRHAGE/clipping (N/A)  2 Days Post-Op  Reason for Referral: Generalized Muscle Weakness (M62.81)  Inpatient: Payor: MEDICARE / Plan: MEDICARE PART A AND B / Product Type: *No Product type* /     ASSESSMENT:     REHAB RECOMMENDATIONS:   Recommendation to date pending progress:  Setting:  Eval d/c this date    Equipment:    None     ASSESSMENT:  Ms. Allison Disla is a 76 y/o female who presents with GI bleed. Pt had blood transfusion yesterday and was receiving another transfusion upon evaluation. Upon admission, pt was positive for orthostatics however symptoms have resolved following transfusion. At baseline pt is independent, lives with daughter and grandsons, and does not use DME. This date, independent with functional mobility while pushing IV pole, toileting, and grooming tasks. Pt reports that she has been up ad marcelo in room and has not had any difficulties. Today pt presents at baseline and has no OT needs. Will remove from caseload.      [address] [place] [facility]\"" [address] [id number] [facility] 6 Clicks Daily Activity Inpatient Short Form:    AM-PAC Daily Activity Inpatient   How much help for putting on and taking off regular lower body clothing?: None  How much help for Bathing?: None  How much help for Toileting?: None  How much help for putting on and taking off regular upper body clothing?: None  How much help for taking care of personal grooming?: None  How much help for eating meals?: None  [facility] Inpatient Daily Activity Raw Score: 24  AM-PAC Inpatient ADL T-Scale Score : 57.54  ADL Inpatient CMS 0-100% Score: 0  ADL Inpatient CMS G-Code Modifier : CH           SUBJECTIVE:     Ms. Ani Daniels states, \"I'm glad I won't need to see you again\"     Social/Functional Lives With: Family  Type of Home:  ([facility])  Home Layout: Multi-level, Able to Live on Main level with bedroom/bathroom  Bathroom Shower/Tub: Walk-in shower, Shower chair with back  ADL Assistance: Independent    OBJECTIVE:     Walt Duran / Cathi Crum / Ezio Manriquez: IV    RESTRICTIONS/PRECAUTIONS:       PAIN: Luque Haggis / O2:   Pre Treatment:    0/10      Post Treatment: 0/10       Vitals          Oxygen            GROSS EVALUATION: INTACT IMPAIRED   (See Comments)   UE AROM [x] []   UE PROM [x] []   Strength [x]       Posture / Balance [x]     Sensation [x]     Coordination [x]       Tone [x]       Edema [x]    Activity Tolerance [x]       Hand Dominance R [] L []      COGNITION/  PERCEPTION: INTACT IMPAIRED   (See Comments)   Orientation [x]     Vision [x]     Hearing [x]     Cognition  [x]     Perception [x]       MOBILITY: I Mod I S SBA CGA Min Mod Max Total  NT x2 Comments:   Bed Mobility    Rolling [] [] [] [] [] [] [] [] [] [] []    Supine to Sit [x] [] [] [] [] [] [] [] [] [] []    Scooting [x] [] [] [] [] [] [] [] [] [] []    Sit to Supine [x] [] [] [] [] [] [] [] [] [] []    Transfers    Sit to Stand [x] [] [] [] [] [] [] [] [] [] []    Bed to Chair [] [] [] [] [] [] [] [] [] [] []    Stand to Sit [x] [] [] [] [] [] [] [] [] [] []    Tub/Shower [] [] [] [] [] [] [] [] [] [] []     Toilet [x] [] [] [] [] [] [] [] [] [] []      [] [] [] [] [] [] [] [] [] [] []    I=Independent, Mod I=Modified Independent, S=Supervision/Setup, SBA=Standby Assistance, CGA=Contact Guard Assistance, Min=Minimal Assistance, Mod=Moderate Assistance, Max=Maximal Assistance, Total=Total Assistance, NT=Not Tested    ACTIVITIES OF DAILY LIVING: I Mod I S SBA CGA Min Mod Max Total NT Comments   BASIC ADLs:              Upper Body Bathing  [] [] [] [] [] [] [] [] [] []    Lower Body Bathing [] [] [] [] [] [] [] [] [] [] Toileting [] [] [] [] [] [] [] [] [] []    Upper Body Dressing [] [] [] [] [] [] [] [] [] []    Lower Body Dressing [x] [] [] [] [] [] [] [] [] []    Feeding [] [] [] [] [] [] [] [] [] []    Grooming [x] [] [] [] [] [] [] [] [] []    Personal Device Care [] [] [] [] [] [] [] [] [] []    Functional Mobility [x] [] [] [] [] [] [] [] [] []    I=Independent, Mod I=Modified Independent, S=Supervision/Setup, SBA=Standby Assistance, CGA=Contact Guard Assistance, Min=Minimal Assistance, Mod=Moderate Assistance, Max=Maximal Assistance, Total=Total Assistance, NT=Not Tested    PLAN:   FREQUENCY/DURATION   OT Plan of Care:  (Eval and d/c) for duration of hospital stay or until stated goals are met, whichever comes first.    PROBLEM LIST:   (Skilled intervention is medically necessary to address:)  None- eval and d/c this date   INTERVENTIONS PLANNED:  (Benefits and precautions of occupational therapy have been discussed with the patient.)  None         TREATMENT:     EVALUATION: LOW COMPLEXITY: (Untimed Charge)    TREATMENT:   none    TREATMENT GRID:  N/A    AFTER TREATMENT PRECAUTIONS: Bed, Bed/Chair Locked, Call light within reach, Needs within reach, and RN notified    INTERDISCIPLINARY COLLABORATION:  RN/ PCT and PT/ PTA    EDUCATION:  Education Given To: Patient  Education Provided: Role of Therapy  Education Method: Verbal  Barriers to Learning: None  Education Outcome: Verbalized understanding    TOTAL TREATMENT DURATION AND TIME:  Time In: 1412  Time Out: 1423  Minutes: Nyla Mcarthur

## 2022-11-08 NOTE — PROGRESS NOTES
Hospitalist Progress Note   Admit Date:  2022  6:51 PM   Name:  John Aponte   Age:  77 y.o. Sex:  female  :  1945   MRN:  [id number]   Room:  [facility]    Presenting Complaint: GI Bleeding     Reason(s) for Admission: Acute upper gastrointestinal hemorrhage [K92.2]  UGIB (upper gastrointestinal bleed) [facility] Hospital Course:   John Aponte is a 77 y.o. 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 Also had maroon red loose stools. Has lightheadedness, dizziness with position change but if lying supine she is stable. She is on warfarin and ASA. Denies use of NSAIDs. No prior h/o GIB. Denies abdominal pain. Normal cologuard on 22. Rec'd plasma after open heart surgery 20 years ago and had allergic rxn. Both daughter and patient unsure what type of plasma. BP soft while en route 100/50s with positive orthostatics. In ED, patient's MAP 68-70. Fecal occult positive. Scr 0.9 BUN 42 hgb 9.2  (BL 11-12)PT 27.3 INR 2.5. she was loaded with protonix 80 mg IV and both hospitalist & GI were consulted for admission. Subjective & 24hr Events (22): Patient stable overnight. No reports of exsanguination. Hg drifting down      Assessment & Plan:   Acute upper GI bleed   Underwent emergent EGD( ) and had endoclips x2 place in distal antrum of stomach. Then underwent embolization by IR ()  Hg stable  IV PPI  Appreciate GI's assistance    Will transfuse another unit of prbcs.   If hg continues drift downwards after transfusion will order CT to eval for retroperitoneal hematoma    Mechanical valve  Due to chronic bacterial endocarditis  Appreciate Cardiology recs  Heparin drip started and recommend full anticoagulation if INR < 2.5 to prevent CVA    Will hold coumadin    Bioprosthetic aortic valve  Can hold ASA for now but will need to be restarted in the future per Cardiology. Likely as an outpatient    Permanent afib  Rate controled (has pacemaker)  On Heparin drip    Secondary hypercoagulable state  Will need to be restarted on Coumadin due to Mechanical Mitral Valve    Chronic bacterial endocarditis  Cefadroxil not on formulary she will be continued on Keflex    Chronic HFrEF  Compensated  Holding ARB given soft blood pressure. Will continue CoReg    Hypothyroid  Synthroid      Anticipated discharge needs:      Pending hospital course    Diet:  ADULT DIET; Clear Liquid  DVT PPx: Heparin drip  Code status: Full Code    Hospital Problems:  Principal Problem:    Acute upper gastrointestinal hemorrhage  Active Problems:    ABLA (acute blood loss anemia)    Warfarin anticoagulation    Hypercoagulable state, secondary ([address]    Chronic bacterial endocarditis    History of mitral valve replacement with mechanical valve    Cardiac pacemaker    History of aortic valve replacement with bioprosthetic valve    Chronic systolic heart failure (HCC)    Permanent atrial fibrillation ([address]  Resolved Problems:    * No resolved hospital problems.  *      Objective:   Patient Vitals for the past 24 hrs:   Temp Pulse Resp BP SpO2   11/08/22 1528 97.9 °F (36.6 °C) 76 18 (!) 118/50 95 %   11/08/22 1245 97.8 °F (36.6 °C) 70 14 (!) 104/58 93 %   11/08/22 1202 97.7 °F (36.5 °C) 71 18 (!) 121/53 99 %   11/08/22 0806 -- 71 -- (!) 120/50 97 %   11/08/22 0804 97.3 °F (36.3 °C) 70 22 (!) 107/49 98 %   11/08/22 0600 -- 69 -- -- --   11/08/22 0412 97.3 °F (36.3 °C) 72 17 (!) 98/50 94 %   11/08/22 0000 -- 69 -- -- --   11/07/22 2337 97.9 °F (36.6 °C) 70 17 (!) 106/45 93 %   11/07/22 2029 97.9 °F (36.6 °C) 71 18 108/61 95 %   11/07/22 2020 97.9 °F (36.6 °C) 71 18 108/61 95 %   11/07/22 1955 97.9 °F (36.6 °C) 71 17 (!) 109/46 97 %   11/07/22 1848 -- -- -- (!) 91/44 --   11/07/22 1826 97.5 °F (36.4 °C) 70 20 80/61 98 %   11/07/22 1809 98 °F (36.7 °C) 70 16 (!) 97/41 100 %   11/07/22 1624 97.9 °F (36.6 °C) 73 16 (!) 129/48 96 %       Oxygen Therapy  SpO2: 95 %  Pulse via Oximetry: 70 beats per minute  Pulse Oximeter Device Mode: Continuous  Pulse Oximeter Device Location: Right, Finger  O2 Device: None (Room air)  O2 Flow Rate (L/min): 4 L/min    Estimated body mass index is 32.2 kg/m² as calculated from the following:    Height as of this encounter: 5' 6\" (1.676 m). Weight as of this encounter: 199 lb 8.3 oz (90.5 kg). Intake/Output Summary (Last 24 hours) at 11/8/2022 1612  Last data filed at 11/8/2022 1444  Gross per 24 hour   Intake 1186.91 ml   Output --   Net 1186.91 ml         Physical Exam:     Blood pressure (!) 118/50, pulse 76, temperature 97.9 °F (36.6 °C), temperature source Oral, resp. rate 18, height 5' 6\" (1.676 m), weight 199 lb 8.3 oz (90.5 kg), SpO2 95 %. General:    Well nourished. Head:  Normocephalic, atraumatic  Eyes:  Sclerae appear normal.  Pupils equally round. ENT:  Nares appear normal, no drainage. Moist oral mucosa  Neck:  No restricted ROM. Trachea midline   CV:   RRR. No m/r/g. No jugular venous distension. Lungs:   CTAB. No wheezing, rhonchi, or rales. Symmetric expansion. Abdomen: Bowel sounds present. Soft, nontender, nondistended. Extremities: No cyanosis or clubbing. No edema  Skin:     No rashes and normal coloration. Warm and dry. Neuro:  CN II-XII grossly intact. Sensation intact. A&Ox3  Psych:  Normal mood and affect.       I have personally reviewed labs and tests showing:  Recent Labs:  Recent Results (from the past 48 hour(s))   EKG 12 Lead (Select if Upper Abdominal Pain or symptoms of SOB,or Tachycardia) Collection Time: 11/06/22  6:42 PM   Result Value Ref Range    Ventricular Rate 72 BPM    Atrial Rate 0 BPM    QRS Duration 132 ms    Q-T Interval 403 ms    QTc Calculation (Bazett) 441 ms    R Axis 120 degrees    T Axis 9 degrees    Diagnosis Electronic ventricular pacemaker    CBC with Auto Differential    Collection Time: 11/06/22  7:01 PM   Result Value Ref Range    WBC 8.9 4.3 - 11.1 K/uL    RBC 3.00 (L) 4.05 - 5.2 M/uL    Hemoglobin 9.2 (L) 11.7 - 15.4 g/dL    Hematocrit 28.3 (L) 35.8 - 46.3 %    MCV 94.3 82 - 102 FL    MCH 30.7 26.1 - 32.9 PG    MCHC 32.5 31.4 - 35.0 g/dL    RDW 12.8 11.9 - 14.6 %    Platelets 618 475 - 173 K/uL    MPV 10.8 9.4 - 12.3 FL    nRBC 0.00 0.0 - 0.2 K/uL    Differential Type AUTOMATED      Seg Neutrophils 84 (H) 43 - 78 %    Lymphocytes 10 (L) 13 - 44 %    Monocytes 5 4.0 - 12.0 %    Eosinophils % 1 0.5 - 7.8 %    Basophils 0 0.0 - 2.0 %    Immature Granulocytes 0 0.0 - 5.0 %    Segs Absolute 7.5 1.7 - 8.2 K/UL    Absolute Lymph # 0.9 0.5 - 4.6 K/UL    Absolute Mono # 0.5 0.1 - 1.3 K/UL    Absolute Eos # 0.0 0.0 - 0.8 K/UL    Basophils Absolute 0.0 0.0 - 0.2 K/UL    Absolute Immature Granulocyte 0.0 0.0 - 0.5 K/UL   CMP    Collection Time: 11/06/22  7:01 PM   Result Value Ref Range    Sodium 140 133 - 143 mmol/L    Potassium 4.4 3.5 - 5.1 mmol/L    Chloride 112 (H) 101 - 110 mmol/L    CO2 27 21 - 32 mmol/L    Anion Gap 1 (L) 2 - 11 mmol/L    Glucose 121 (H) 65 - 100 mg/dL    BUN 42 (H) 8 - 23 MG/DL    Creatinine 0.90 0.6 - 1.0 MG/DL    Est, Glom Filt Rate >60 >60 ml/min/1.73m2    Calcium 9.0 8.3 - 10.4 MG/DL    Total Bilirubin 0.5 0.2 - 1.1 MG/DL    ALT 17 12 - 65 U/L    AST 15 15 - 37 U/L    Alk Phosphatase 76 50 - 136 U/L    Total Protein 6.6 6.3 - 8.2 g/dL    Albumin 3.6 3.2 - 4.6 g/dL    Globulin 3.0 2.8 - 4.5 g/dL    Albumin/Globulin Ratio 1.2 0.4 - 1.6     Protime-INR    Collection Time: 11/06/22  7:01 PM   Result Value Ref Range    Protime 27.3 (H) 12.6 - 14.3 sec    INR 2.5 TYPE AND SCREEN    Collection Time: 11/06/22  7:01 PM   Result Value Ref Range    Crossmatch expiration date 11/09/2022,2359     ABO/Rh O POSITIVE     Antibody Screen NEG    Protime-INR    Collection Time: 11/07/22  1:43 AM   Result Value Ref Range    Protime 16.7 (H) 12.6 - 14.3 sec    INR 1.3     CBC    Collection Time: 11/07/22  1:43 AM   Result Value Ref Range    WBC 9.1 4.3 - 11.1 K/uL    RBC 2.40 (L) 4.05 - 5.2 M/uL    Hemoglobin 7.4 (L) 11.7 - 15.4 g/dL    Hematocrit 23.0 (L) 35.8 - 46.3 %    MCV 95.8 82 - 102 FL    MCH 30.8 26.1 - 32.9 PG    MCHC 32.2 31.4 - 35.0 g/dL    RDW 12.7 11.9 - 14.6 %    Platelets 121 (L) 109 - 450 K/uL    MPV 10.8 9.4 - 12.3 FL    nRBC 0.00 0.0 - 0.2 K/uL   Protime-INR    Collection Time: 11/07/22  5:24 AM   Result Value Ref Range    Protime 15.8 (H) 12.6 - 14.3 sec    INR 1.2     APTT    Collection Time: 11/07/22  5:24 AM   Result Value Ref Range    PTT 29.1 24.5 - 34.2 SEC   Comprehensive Metabolic Panel    Collection Time: 11/07/22  5:24 AM   Result Value Ref Range    Sodium 143 133 - 143 mmol/L    Potassium 4.4 3.5 - 5.1 mmol/L    Chloride 114 (H) 101 - 110 mmol/L    CO2 25 21 - 32 mmol/L    Anion Gap 4 2 - 11 mmol/L    Glucose 157 (H) 65 - 100 mg/dL    BUN 37 (H) 8 - 23 MG/DL    Creatinine 0.80 0.6 - 1.0 MG/DL    Est, Glom Filt Rate >60 >60 ml/min/1.73m2    Calcium 8.6 8.3 - 10.4 MG/DL    Total Bilirubin 0.6 0.2 - 1.1 MG/DL    ALT 15 12 - 65 U/L    AST 11 (L) 15 - 37 U/L    Alk Phosphatase 62 50 - 136 U/L    Total Protein 5.3 (L) 6.3 - 8.2 g/dL    Albumin 3.1 (L) 3.2 - 4.6 g/dL    Globulin 2.2 (L) 2.8 - 4.5 g/dL    Albumin/Globulin Ratio 1.4 0.4 - 1.6     Magnesium    Collection Time: 11/07/22  5:24 AM   Result Value Ref Range    Magnesium 2.2 1.8 - 2.4 mg/dL   CBC with Auto Differential    Collection Time: 11/07/22  5:24 AM   Result Value Ref Range    WBC 9.0 4.3 - 11.1 K/uL    RBC 2.46 (L) 4.05 - 5.2 M/uL    Hemoglobin 7.5 (L) 11.7 - 15.4 g/dL    Hematocrit 23.0 (L) 35.8 - 46.3 %    MCV 93.5 82 - 102 FL    MCH 30.5 26.1 - 32.9 PG    MCHC 32.6 31.4 - 35.0 g/dL    RDW 12.7 11.9 - 14.6 %    Platelets 118 (L) 612 - 450 K/uL    MPV 10.3 9.4 - 12.3 FL    nRBC 0.00 0.0 - 0.2 K/uL    Differential Type AUTOMATED      Seg Neutrophils 91 (H) 43 - 78 %    Lymphocytes 8 (L) 13 - 44 %    Monocytes 1 (L) 4.0 - 12.0 %    Eosinophils % 0 (L) 0.5 - 7.8 %    Basophils 0 0.0 - 2.0 %    Immature Granulocytes 0 0.0 - 5.0 %    Segs Absolute 8.2 1.7 - 8.2 K/UL    Absolute Lymph # 0.7 0.5 - 4.6 K/UL    Absolute Mono # 0.1 0.1 - 1.3 K/UL    Absolute Eos # 0.0 0.0 - 0.8 K/UL    Basophils Absolute 0.0 0.0 - 0.2 K/UL    Absolute Immature Granulocyte 0.0 0.0 - 0.5 K/UL   Lactic Acid    Collection Time: 11/07/22  5:24 AM   Result Value Ref Range    Lactic Acid, Plasma 0.9 0.4 - 2.0 MMOL/L   Hemoglobin and Hematocrit    Collection Time: 11/07/22  2:27 PM   Result Value Ref Range    Hemoglobin 6.9 (LL) 11.7 - 15.4 g/dL    Hematocrit 21.3 (L) 35.8 - 46.3 %   PREPARE RBC (CROSSMATCH), 1 Units    Collection Time: 11/07/22  3:45 PM   Result Value Ref Range    History Check Historical check performed    TYPE AND SCREEN    Collection Time: 11/07/22  4:00 PM   Result Value Ref Range    Crossmatch expiration date 11/10/2022,2359     ABO/Rh O POSITIVE     Antibody Screen NEG     Unit Number [id number]     Product Code Blood Bank  LR     Unit Divison 00     Dispense Status Blood Bank TRANSFUSED     Crossmatch Result Compatible     Unit Number [id number]     Product Code Blood Bank  LR     Unit Divison 00     Dispense Status Blood Bank ISSUED     Crossmatch Result Compatible    Anti-Xa, Unfractionated Heparin    Collection Time: 11/07/22  8:12 PM   Result Value Ref Range    Anti-XA Unfrac Heparin 0.47 0.3 - 0.7 IU/mL   Hemoglobin and Hematocrit    Collection Time: 11/07/22  8:12 PM   Result Value Ref Range    Hemoglobin 7.8 (L) 11.7 - 15.4 g/dL    Hematocrit 23.7 (L) 35.8 - 46.3 %   Hemoglobin and Hematocrit Collection Time: 11/07/22 10:39 PM   Result Value Ref Range    Hemoglobin 8.0 (L) 11.7 - 15.4 g/dL    Hematocrit 24.3 (L) 35.8 - 46.3 %   Anti-Xa, Unfractionated Heparin    Collection Time: 11/07/22 10:39 PM   Result Value Ref Range    Anti-XA Unfrac Heparin 0.51 0.3 - 0.7 IU/mL   CBC with Auto Differential    Collection Time: 11/08/22  4:46 AM   Result Value Ref Range    WBC 8.2 4.3 - 11.1 K/uL    RBC 2.35 (L) 4.05 - 5.2 M/uL    Hemoglobin 7.1 (L) 11.7 - 15.4 g/dL    Hematocrit 22.1 (L) 35.8 - 46.3 %    MCV 94.0 82 - 102 FL    MCH 30.2 26.1 - 32.9 PG    MCHC 32.1 31.4 - 35.0 g/dL    RDW 13.7 11.9 - 14.6 %    Platelets 727 (L) 975 - 450 K/uL    MPV 11.3 9.4 - 12.3 FL    nRBC 0.00 0.0 - 0.2 K/uL    Differential Type AUTOMATED      Seg Neutrophils 72 43 - 78 %    Lymphocytes 20 13 - 44 %    Monocytes 6 4.0 - 12.0 %    Eosinophils % 1 0.5 - 7.8 %    Basophils 0 0.0 - 2.0 %    Immature Granulocytes 1 0.0 - 5.0 %    Segs Absolute 6.0 1.7 - 8.2 K/UL    Absolute Lymph # 1.6 0.5 - 4.6 K/UL    Absolute Mono # 0.5 0.1 - 1.3 K/UL    Absolute Eos # 0.1 0.0 - 0.8 K/UL    Basophils Absolute 0.0 0.0 - 0.2 K/UL    Absolute Immature Granulocyte 0.0 0.0 - 0.5 K/UL   Basic Metabolic Panel w/ Reflex to MG    Collection Time: 11/08/22  4:46 AM   Result Value Ref Range    Sodium 144 (H) 133 - 143 mmol/L    Potassium 4.0 3.5 - 5.1 mmol/L    Chloride 113 (H) 101 - 110 mmol/L    CO2 25 21 - 32 mmol/L    Anion Gap 6 2 - 11 mmol/L    Glucose 85 65 - 100 mg/dL    BUN 26 (H) 8 - 23 MG/DL    Creatinine 1.00 0.6 - 1.0 MG/DL    Est, Glom Filt Rate 59 (L) >60 ml/min/1.73m2    Calcium 8.4 8.3 - 10.4 MG/DL   Anti-Xa, Unfractionated Heparin    Collection Time: 11/08/22  4:46 AM   Result Value Ref Range    Anti-XA Unfrac Heparin 0.38 0.3 - 0.7 IU/mL   Hemoglobin and Hematocrit    Collection Time: 11/08/22  8:24 AM   Result Value Ref Range    Hemoglobin 7.1 (L) 11.7 - 15.4 g/dL    Hematocrit 21.8 (L) 35.8 - 46.3 %   PREPARE RBC (CROSSMATCH), 1 Units Collection Time: 11/08/22 10:00 AM   Result Value Ref Range    History Check Historical check performed    Hemoglobin and Hematocrit    Collection Time: 11/08/22  3:09 PM   Result Value Ref Range    Hemoglobin 8.3 (L) 11.7 - 15.4 g/dL    Hematocrit 25.1 (L) 35.8 - 46.3 %       I have personally reviewed imaging studies showing: Other Studies:  IR EMBOLIZATION HEMORRHAGE   Final Result   1. Upper mesenteric arteriogram as above. The gastroduodenal artery was   embolized as this feeds the area of concern and the endoscopic clip. Plan:  3 hours bedrest. Continue to monitor hemoglobin.           Current Meds:  Current Facility-Administered Medications   Medication Dose Route Frequency    0.9 % sodium chloride infusion   IntraVENous PRN    heparin (porcine) injection 4,000 Units  4,000 Units IntraVENous PRN    heparin (porcine) injection 2,000 Units  2,000 Units IntraVENous PRN    heparin 25,000 units in dextrose 5% 250 mL (premix) infusion  5-30 Units/kg/hr IntraVENous Continuous    0.9 % sodium chloride infusion   IntraVENous PRN    sodium chloride flush 0.9 % injection 5-40 mL  5-40 mL IntraVENous 2 times per day    sodium chloride flush 0.9 % injection 5-40 mL  5-40 mL IntraVENous PRN    0.9 % sodium chloride infusion   IntraVENous PRN    ondansetron (ZOFRAN-ODT) disintegrating tablet 4 mg  4 mg Oral Q8H PRN    Or    ondansetron (ZOFRAN) injection 4 mg  4 mg IntraVENous Q6H PRN    acetaminophen (TYLENOL) tablet 650 mg  650 mg Oral Q6H PRN    Or    acetaminophen (TYLENOL) suppository 650 mg  650 mg Rectal Q6H PRN    pantoprazole (PROTONIX) 40 mg in sodium chloride (PF) 0.9 % 10 mL injection  40 mg IntraVENous Q12H    cephALEXin (KEFLEX) capsule 500 mg  500 mg Oral 4 times per day    levothyroxine (SYNTHROID) tablet 50 mcg  50 mcg Oral Daily    [Held by provider] carvedilol (COREG) tablet 3.125 mg  3.125 mg Oral BID WC    lactated ringers infusion   IntraVENous Continuous       Signed:  Francoise Napier MD    Part of this note may have been written by using a voice dictation software. The note has been proof read but may still contain some grammatical/other typographical errors.

## 2022-11-08 NOTE — PROGRESS NOTES
Resting in bed. IVF infusing. Pt denies any n/v/pain overnight. Reports no bloody BMs, and none witnessed this shift. Has been hypotensive overnight, but asymptomatic. Hourly rounds completed, all needs met this shift. Bed in L/L with call light in reach. Report to be given to dayshift nurse. Hep gtt infusing @ 11 units/kg/hr.

## 2022-11-08 NOTE — PROGRESS NOTES
Physician Progress Note      Indira Ulloa  CSN #:                  [id number]  :                       1945  ADMIT DATE:       2022 6:51 PM  [address] [place] DATE:  RESPONDING  PROVIDER #:        Jamison Tanner MD        QUERY TEXT:    Stage of Chronic Kidney Disease: Please provide further specificity, if known. Clinical indicators include: bun, creatinine, chronic renal failure, hd, scr  Options provided:  -- Chronic kidney disease stage 1  -- Chronic kidney disease stage 2  -- Chronic kidney disease stage 3  -- Chronic kidney disease stage 3a  -- Chronic kidney disease stage 3b  -- Chronic kidney disease stage 4  -- Chronic kidney disease stage 5  -- Chronic kidney disease stage 5, requiring dialysis  -- End stage renal disease  -- Other - I will add my own diagnosis  -- Disagree - Not applicable / Not valid  -- Disagree - Clinically Unable to determine / Unknown        PROVIDER RESPONSE TEXT:    Provider dismissed this query because it was not applicable to the patient or   not a valid query.       Electronically signed by:  Jamison Tanner MD 2022 9:46 AM

## 2022-11-08 NOTE — PROGRESS NOTES
ACUTE PHYSICAL THERAPY GOALS:   (Developed with and agreed upon by patient and/or caregiver.)    (1.) [place]  will move from supine to sit and sit to supine  with INDEPENDENCE within 7 treatment day(s). (2.) [place] will transfer from bed to chair and chair to bed with INDEPENDENCE using the least restrictive device within 7 treatment day(s). (3.) [place] will ambulate with INDEPENDENCE for 500+ feet with the least restrictive device within 7 treatment day(s). (4.) [place] will perform standing static and dynamic balance activities x 20 minutes with SUPERVISION to improve safety within 7 treatment day(s). (5.) [place] will ascend and descend 1 stairs using 0 hand rail(s) with SUPERVISION to improve functional mobility and safety within 7 treatment day(s). (6.) [place] will perform bilateral lower extremity exercises x 20 min for HEP with INDEPENDENCE to improve strength, endurance, and functional mobility within 7 treatment day(s).        PHYSICAL THERAPY Initial Assessment, Daily Note, and PM  (Link to Caseload Tracking: PT Visit Days : 1  Acknowledge Orders  Time In/Out  PT Charge Capture  Rehab Caseload Tracker    Akeneo Elva Saldana is a 77 y.o. female   PRIMARY DIAGNOSIS: Acute upper gastrointestinal hemorrhage  Acute upper gastrointestinal hemorrhage [K92.2]  UGIB (upper gastrointestinal bleed) [K92.2]  Procedure(s) (LRB):  EGD CONTROL HEMORRHAGE/clipping (N/A)  2 Days Post-Op  Reason for Referral: Generalized Muscle Weakness (M62.81)  Difficulty in walking, Not elsewhere classified (R26.2)  Inpatient: Payor: MEDICARE / Plan: MEDICARE PART A AND B / Product Type: *No Product type* /     ASSESSMENT:     REHAB RECOMMENDATIONS:   Recommendation to date pending progress:  Setting:  No further skilled therapy after discharge from hospital    Equipment:    To Be Determined IV    RESTRICTIONS/PRECAUTIONS:                    GROSS EVALUATION: B LE Intact Impaired (Comments):   AROM [x]     PROM [x]    Strength []  Generalized weakness   Balance [] Posture: Good  Sitting - Static: Good  Sitting - Dynamic: Good  Standing - Static: Good  Standing - Dynamic: Fair, +   Posture [] N/A   Sensation []     Coordination []      Tone []     Edema []    Activity Tolerance [] Patient limited by fatigue    []      COGNITION/  PERCEPTION: Intact Impaired (Comments):   Orientation [x]     Vision [x]     Hearing [x]     Cognition  [x]       MOBILITY: I Mod I S SBA CGA Min Mod Max Total  NT x2 Comments:   Bed Mobility    Rolling [] [] [x] [] [] [] [] [] [] [] []    Supine to Sit [] [] [x] [] [] [] [] [] [] [] []    Scooting [] [] [x] [] [] [] [] [] [] [] []    Sit to Supine [] [] [x] [] [] [] [] [] [] [] []    Transfers    Sit to Stand [] [] [] [x] [] [] [] [] [] [] []    Bed to Chair [] [] [] [] [] [] [] [] [] [] [] Pt declined   Stand to Sit [] [] [] [x] [] [] [] [] [] [] []     [] [] [] [] [] [] [] [] [] [] []    I=Independent, Mod I=Modified Independent, S=Supervision, SBA=Standby Assistance, CGA=Contact Guard Assistance,   Min=Minimal Assistance, Mod=Moderate Assistance, Max=Maximal Assistance, Total=Total Assistance, NT=Not Tested    GAIT: I Mod I S SBA CGA Min Mod Max Total  NT x2 Comments:   Level of Assistance [] [] [] [x] [] [] [] [] [] [] []    Distance 250 feet    DME None    Gait Quality Decreased frank , Narrow base of support, and Trunk sway increased    Weightbearing Status      Stairs      I=Independent, Mod I=Modified Independent, S=Supervision, SBA=Standby Assistance, CGA=Contact Guard Assistance,   Min=Minimal Assistance, Mod=Moderate Assistance, Max=Maximal Assistance, Total=Total Assistance, NT=Not Tested    PLAN:   FREQUENCY AND DURATION: 3 times/week for duration of hospital stay or until stated goals are met, whichever comes first.    THERAPY PROGNOSIS: Good    PROBLEM LIST: (Skilled intervention is medically necessary to address:)  Decreased Activity Tolerance  Decreased Balance  Decreased Gait Ability  Decreased Strength  Decreased Transfer Abilities INTERVENTIONS PLANNED:   (Benefits and precautions of physical therapy have been discussed with the patient.)  Therapeutic Activity  Therapeutic Exercise/HEP  Neuromuscular Re-education  Gait Training  Education       TREATMENT:   EVALUATION: LOW COMPLEXITY: (Untimed Charge)    TREATMENT:   Therapeutic Activity (8 Minutes): Therapeutic activity included Rolling, Supine to Sit, Sit to Supine, Scooting, Ambulation on level ground, Sitting balance , and Standing balance to improve functional Activity tolerance, Balance, Mobility, and Strength. TREATMENT GRID:  N/A    AFTER TREATMENT PRECAUTIONS: Bed, Bed/Chair Locked, Call light within reach, Needs within reach, RN notified, and Visitors at bedside    INTERDISCIPLINARY COLLABORATION:  RN/ PCT and OT/ SANTANA    EDUCATION: Education Given To: Patient  Education Provided: Role of Therapy;Plan of Care; Fall Prevention Strategies  Education Method: Verbal  Barriers to Learning: None  Education Outcome: Verbalized understanding    TIME IN/OUT:  Time In: 1550  [facility]  Minutes: [address] [facility] Rohan SIMS, PT

## 2022-11-09 LAB
ABO + RH BLD: NORMAL
ANION GAP SERPL CALC-SCNC: 4 MMOL/L (ref 2–11)
BASOPHILS # BLD: 0 K/UL (ref 0–0.2)
BASOPHILS NFR BLD: 1 % (ref 0–2)
BLD PROD TYP BPU: NORMAL
BLD PROD TYP BPU: NORMAL
BLOOD BANK DISPENSE STATUS: NORMAL
BLOOD BANK DISPENSE STATUS: NORMAL
BLOOD GROUP ANTIBODIES SERPL: NORMAL
BPU ID: NORMAL
BPU ID: NORMAL
BUN SERPL-MCNC: 16 MG/DL (ref 8–23)
CALCIUM SERPL-MCNC: 8.5 MG/DL (ref 8.3–10.4)
CHLORIDE SERPL-SCNC: 113 MMOL/L (ref 101–110)
CO2 SERPL-SCNC: 26 MMOL/L (ref 21–32)
CREAT SERPL-MCNC: 0.9 MG/DL (ref 0.6–1)
CROSSMATCH RESULT: NORMAL
CROSSMATCH RESULT: NORMAL
DIFFERENTIAL METHOD BLD: ABNORMAL
EOSINOPHIL # BLD: 0.1 K/UL (ref 0–0.8)
EOSINOPHIL NFR BLD: 2 % (ref 0.5–7.8)
ERYTHROCYTE [DISTWIDTH] IN BLOOD BY AUTOMATED COUNT: 15 % (ref 11.9–14.6)
GLUCOSE SERPL-MCNC: 87 MG/DL (ref 65–100)
HCT VFR BLD AUTO: 23.7 % (ref 35.8–46.3)
HCT VFR BLD AUTO: 24.2 % (ref 35.8–46.3)
HGB BLD-MCNC: 7.7 G/DL (ref 11.7–15.4)
HGB BLD-MCNC: 8 G/DL (ref 11.7–15.4)
IMM GRANULOCYTES # BLD AUTO: 0.1 K/UL (ref 0–0.5)
IMM GRANULOCYTES NFR BLD AUTO: 1 % (ref 0–5)
LYMPHOCYTES # BLD: 1.3 K/UL (ref 0.5–4.6)
LYMPHOCYTES NFR BLD: 23 % (ref 13–44)
MCH RBC QN AUTO: 29.7 PG (ref 26.1–32.9)
MCHC RBC AUTO-ENTMCNC: 32.5 G/DL (ref 31.4–35)
MCV RBC AUTO: 91.5 FL (ref 82–102)
MONOCYTES # BLD: 0.5 K/UL (ref 0.1–1.3)
MONOCYTES NFR BLD: 8 % (ref 4–12)
NEUTS SEG # BLD: 3.9 K/UL (ref 1.7–8.2)
NEUTS SEG NFR BLD: 65 % (ref 43–78)
NRBC # BLD: 0 K/UL (ref 0–0.2)
PLATELET # BLD AUTO: 124 K/UL (ref 150–450)
PMV BLD AUTO: 11 FL (ref 9.4–12.3)
POTASSIUM SERPL-SCNC: 4.1 MMOL/L (ref 3.5–5.1)
RBC # BLD AUTO: 2.59 M/UL (ref 4.05–5.2)
SODIUM SERPL-SCNC: 143 MMOL/L (ref 133–143)
SPECIMEN EXP DATE BLD: NORMAL
UFH PPP CHRO-ACNC: 0.52 IU/ML (ref 0.3–0.7)
UNIT DIVISION: 0
UNIT DIVISION: 0
WBC # BLD AUTO: 5.9 K/UL (ref 4.3–11.1)

## 2022-11-09 PROCEDURE — 1100000000 HC RM PRIVATE

## 2022-11-09 PROCEDURE — 2580000003 HC RX 258: Performed by: FAMILY MEDICINE

## 2022-11-09 PROCEDURE — 6360000002 HC RX W HCPCS: Performed by: NURSE PRACTITIONER

## 2022-11-09 PROCEDURE — C9113 INJ PANTOPRAZOLE SODIUM, VIA: HCPCS | Performed by: FAMILY MEDICINE

## 2022-11-09 PROCEDURE — A4216 STERILE WATER/SALINE, 10 ML: HCPCS | Performed by: FAMILY MEDICINE

## 2022-11-09 PROCEDURE — 85014 HEMATOCRIT: CPT

## 2022-11-09 PROCEDURE — 6360000002 HC RX W HCPCS: Performed by: FAMILY MEDICINE

## 2022-11-09 PROCEDURE — 36415 COLL VENOUS BLD VENIPUNCTURE: CPT

## 2022-11-09 PROCEDURE — 85025 COMPLETE CBC W/AUTO DIFF WBC: CPT

## 2022-11-09 PROCEDURE — 85520 HEPARIN ASSAY: CPT

## 2022-11-09 PROCEDURE — 99232 SBSQ HOSP IP/OBS MODERATE 35: CPT | Performed by: INTERNAL MEDICINE

## 2022-11-09 PROCEDURE — 6370000000 HC RX 637 (ALT 250 FOR IP): Performed by: FAMILY MEDICINE

## 2022-11-09 PROCEDURE — 80048 BASIC METABOLIC PNL TOTAL CA: CPT

## 2022-11-09 PROCEDURE — 2580000003 HC RX 258: Performed by: ANESTHESIOLOGY

## 2022-11-09 RX ORDER — PSEUDOEPHEDRINE HYDROCHLORIDE 60 MG/1
60 TABLET, FILM COATED ORAL EVERY 6 HOURS PRN
Status: DISCONTINUED | OUTPATIENT
Start: 2022-11-09 | End: 2022-11-11 | Stop reason: HOSPADM

## 2022-11-09 RX ADMIN — CEPHALEXIN 500 MG: 500 CAPSULE ORAL at 17:24

## 2022-11-09 RX ADMIN — ACETAMINOPHEN 650 MG: 325 TABLET ORAL at 22:55

## 2022-11-09 RX ADMIN — SODIUM CHLORIDE 40 MG: 9 INJECTION INTRAMUSCULAR; INTRAVENOUS; SUBCUTANEOUS at 05:33

## 2022-11-09 RX ADMIN — HEPARIN SODIUM AND DEXTROSE 11 UNITS/KG/HR: 10000; 5 INJECTION INTRAVENOUS at 09:28

## 2022-11-09 RX ADMIN — SODIUM CHLORIDE, SODIUM LACTATE, POTASSIUM CHLORIDE, AND CALCIUM CHLORIDE: 600; 310; 30; 20 INJECTION, SOLUTION INTRAVENOUS at 08:08

## 2022-11-09 RX ADMIN — ACETAMINOPHEN 650 MG: 325 TABLET ORAL at 02:32

## 2022-11-09 RX ADMIN — SODIUM CHLORIDE 40 MG: 9 INJECTION INTRAMUSCULAR; INTRAVENOUS; SUBCUTANEOUS at 17:30

## 2022-11-09 RX ADMIN — CEPHALEXIN 500 MG: 500 CAPSULE ORAL at 12:34

## 2022-11-09 RX ADMIN — SODIUM CHLORIDE, PRESERVATIVE FREE 10 ML: 5 INJECTION INTRAVENOUS at 20:29

## 2022-11-09 RX ADMIN — ACETAMINOPHEN 650 MG: 325 TABLET ORAL at 08:06

## 2022-11-09 RX ADMIN — HEPARIN SODIUM AND DEXTROSE 11 UNITS/KG/HR: 10000; 5 INJECTION INTRAVENOUS at 18:57

## 2022-11-09 RX ADMIN — SODIUM CHLORIDE, SODIUM LACTATE, POTASSIUM CHLORIDE, AND CALCIUM CHLORIDE: 600; 310; 30; 20 INJECTION, SOLUTION INTRAVENOUS at 00:49

## 2022-11-09 RX ADMIN — SODIUM CHLORIDE, PRESERVATIVE FREE 10 ML: 5 INJECTION INTRAVENOUS at 08:07

## 2022-11-09 RX ADMIN — CEPHALEXIN 500 MG: 500 CAPSULE ORAL at 05:33

## 2022-11-09 RX ADMIN — LEVOTHYROXINE SODIUM 50 MCG: 0.05 TABLET ORAL at 05:33

## 2022-11-09 RX ADMIN — CEPHALEXIN 500 MG: 500 CAPSULE ORAL at 22:55

## 2022-11-09 ASSESSMENT — PAIN DESCRIPTION - DESCRIPTORS
DESCRIPTORS: ACHING

## 2022-11-09 ASSESSMENT — PAIN DESCRIPTION - LOCATION
LOCATION: HEAD

## 2022-11-09 ASSESSMENT — PAIN SCALES - WONG BAKER
WONGBAKER_NUMERICALRESPONSE: 0
WONGBAKER_NUMERICALRESPONSE: 0

## 2022-11-09 ASSESSMENT — PAIN DESCRIPTION - ORIENTATION
ORIENTATION: MID
ORIENTATION: ANTERIOR

## 2022-11-09 ASSESSMENT — PAIN SCALES - GENERAL
PAINLEVEL_OUTOF10: 3
PAINLEVEL_OUTOF10: 3
PAINLEVEL_OUTOF10: 2
PAINLEVEL_OUTOF10: 0

## 2022-11-09 NOTE — PROGRESS NOTES
[facility] CARDIOLOGY PROGRESS NOTE           11/9/2022 2:32 PM    Admit Date: 11/6/2022      Subjective:   -Rhythm overnight shows underlying atrial fibrillation with ventricular paced around 70 bpm  -Blood pressures have gradually improved. Weight is up to 199 pounds on bed scale. -Received 1 unit of blood yesterday.  -No complaints of any further active bleeding in any way. ROS:  Cardiovascular:  As noted above    Objective:      Vitals:    11/08/22 2346 11/09/22 0344 11/09/22 0752 11/09/22 1211   BP: (!) 116/54 (!) 121/47 (!) 136/58 (!) 135/52   Pulse: 71 74 70 74   Resp: 18 18 16 18   Temp: 98.4 °F (36.9 °C) 98.2 °F (36.8 °C) 97.5 °F (36.4 °C) 97.5 °F (36.4 °C)   TempSrc: Oral Oral Axillary Oral   SpO2: 96% 91% 96% 100%   Weight:       Height:           Physical Exam:  General-No Acute Distress  Neck- supple, no JVD  CV- regular rate and rhythm(ventricular paced) , S1 is mechanical, soft 1/6 systolic murmur noted  Lung- clear bilaterally  Abd- soft, nontender, nondistended  Ext-trace bilateral pitting ankle edema noted  Skin- warm and dry    Data Review:     Lab Results   Component Value Date/Time     11/09/2022 05:57 AM    K 4.1 11/09/2022 05:57 AM     11/09/2022 05:57 AM    CO2 26 11/09/2022 05:57 AM    BUN 16 11/09/2022 05:57 AM    CREATININE 0.90 11/09/2022 05:57 AM    GLUCOSE 87 11/09/2022 05:57 AM    CALCIUM 8.5 11/09/2022 05:57 AM         Lab Results   Component Value Date    WBC 5.9 11/09/2022    HGB 7.7 (L) 11/09/2022    HCT 23.7 (L) 11/09/2022    MCV 91.5 11/09/2022     (L) 11/09/2022 04/12/22    TRANSTHORACIC ECHOCARDIOGRAM (TTE) COMPLETE (CONTRAST/BUBBLE/3D PRN) 04/13/2022 12:40 PM, 04/13/2022 12:00 AM (Final)    Narrative  This is a summary report. The complete report is available in the patient's medical record. If you cannot access the medical record, please contact the sending organization for a detailed fax or copy.       Left Ventricle: Left ventricle size is normal. Mildly increased wall thickness. Normal wall motion. Low normal left ventricular systolic function with a visually estimated EF of 50 - 55%. Aortic Valve: Mildly calcified cusp. Left Atrium: Left atrium is severely dilated. LA Vol Index is  51 ml/m2. Signed by: Zheng Tanner DO on 4/13/2022 12:40 PM, Signed by: Unknown Provider Result on 4/13/2022 12:00 AM     Assessment/Plan:        Active Hospital Problems    Acute upper gastrointestinal hemorrhage    ABLA (acute blood loss anemia)  -Hemoglobin 7.7 today, post transfusion  -Closely monitor counts, per primary/GI. History of mitral valve replacement with mechanical valve  -S/p aortic valve replacement with a bioprosthetic valve. Warfarin anticoagulation  -Reasonable at this time to continue heparin drip, closely monitor blood counts. - If blood counts abruptly drop may need to hold heparin drip. Discussed this with the patient and family at bedside today. -Ideally continue heparin with therapeutic Xa levels while INR is less than 2.5. Look to resume warfarin in the near future once blood counts stabilize the patient improved clinically. Permanent atrial fibrillation (HCC)    Cardiac pacemaker-rhythm shows that she is V paced at 70 bpm with underlying atrial fibrillation  -Anticoagulation plans as above. History of aortic valve replacement with bioprosthetic valve    Chronic bacterial endocarditis prophylaxis.  -On chronic suppressive therapy. Essential hypertension  - Medications held due to hypotension. Likely due to hypovolemia/acute blood loss anemia. Plan today: Blood pressures have improved but still on the low side. Continue to hold carvedilol. Encouraged increased oral fluid intake and we will cut back on IV fluids. Continue to monitor blood levels closely.   Continue to watch for further bleeding    Zuhair Santoyo MD  11/9/2022 2:32 PM

## 2022-11-09 NOTE — PROGRESS NOTES
Pt is in bed resting. Heparin gtt and LR infusing in separate IV sites without difficulty. Pt received 1 unit of blood earlier during shift and tolerated it well. BP's are consistently low but pt is not symptomatic. Pt ambulates to the bathroom without assistance with a steady gait. Night nurse updated.

## 2022-11-09 NOTE — PROGRESS NOTES
[facility] Progress Note         Admit Date:  11/6/2022    Today's Date:  11/9/2022    CC:  GI bleed    Subjective:     Pt only complaints of a sinus headache that is not relieved with Tylenol x2. She denies recurrent GI bleeding and denies BM since admission. She denies abdominal pain, N/V. She is tolerating liquid diet but hoping for more to eat. She and daughter are concerned about drop in Hgb to 7.7 from Hgb 8.3. Medications:   Current Facility-Administered Medications   Medication Dose Route Frequency    0.9 % sodium chloride infusion   IntraVENous PRN    heparin (porcine) injection 4,000 Units  4,000 Units IntraVENous PRN    heparin (porcine) injection 2,000 Units  2,000 Units IntraVENous PRN    heparin 25,000 units in dextrose 5% 250 mL (premix) infusion  5-30 Units/kg/hr IntraVENous Continuous    0.9 % sodium chloride infusion   IntraVENous PRN    sodium chloride flush 0.9 % injection 5-40 mL  5-40 mL IntraVENous 2 times per day    sodium chloride flush 0.9 % injection 5-40 mL  5-40 mL IntraVENous PRN    0.9 % sodium chloride infusion   IntraVENous PRN    ondansetron (ZOFRAN-ODT) disintegrating tablet 4 mg  4 mg Oral Q8H PRN    Or    ondansetron (ZOFRAN) injection 4 mg  4 mg IntraVENous Q6H PRN    acetaminophen (TYLENOL) tablet 650 mg  650 mg Oral Q6H PRN    Or    acetaminophen (TYLENOL) suppository 650 mg  650 mg Rectal Q6H PRN    pantoprazole (PROTONIX) 40 mg in sodium chloride (PF) 0.9 % 10 mL injection  40 mg IntraVENous Q12H    cephALEXin (KEFLEX) capsule 500 mg  500 mg Oral 4 times per day    levothyroxine (SYNTHROID) tablet 50 mcg  50 mcg Oral Daily    [Held by provider] carvedilol (COREG) tablet 3.125 mg  3.125 mg Oral BID WC    lactated ringers infusion   IntraVENous Continuous       Review of Systems:  ROS was obtained, with pertinent positives as listed above. No chest pain or SOB.     Diet:  Clear liquids    Objective:   Vitals:  BP (!) 136/58   Pulse 70   Temp 97.5 °F (36.4 °C) (Axillary)   Resp 16   Ht 5' 6\" (1.676 m)   Wt 199 lb 8.3 oz (90.5 kg)   SpO2 96%   BMI 32.20 kg/m²   Intake/Output:  No intake/output data recorded. 11/07 1901 - 11/09 0700  In: 886.9 [P.O.:240]  Out: -   Exam:  General appearance: alert, cooperative, no distress  Lungs: Clear to ascultation bilaterally. Heart: RRR   Abdomen: soft, non-distended, non-tender. BSx4 WNL. Neuro:  alert and oriented    Data Review (Labs):    Recent Labs     11/06/22  1901 11/07/22  0143 11/07/22  0524 11/07/22  1427 11/07/22 2012 11/07/22  2239 11/08/22  0446 11/08/22  0824 11/08/22  1509 11/09/22  0557   WBC 8.9 9.1 9.0  --   --   --  8.2  --   --  5.9   HGB 9.2* 7.4* 7.5* 6.9* 7.8* 8.0* 7.1* 7.1* 8.3* 7.7*   HCT 28.3* 23.0* 23.0* 21.3* 23.7* 24.3* 22.1* 21.8* 25.1* 23.7*    135* 128*  --   --   --  119*  --   --  124*   MCV 94.3 95.8 93.5  --   --   --  94.0  --   --  91.5     --  143  --   --   --  144*  --   --   --    K 4.4  --  4.4  --   --   --  4.0  --   --   --    *  --  114*  --   --   --  113*  --   --   --    CO2 27  --  25  --   --   --  25  --   --   --    BUN 42*  --  37*  --   --   --  26*  --   --   --    MG  --   --  2.2  --   --   --   --   --   --   --    AST 15  --  11*  --   --   --   --   --   --   --    ALT 17  --  15  --   --   --   --   --   --   --    INR 2.5 1.3 1.2  --   --   --   --   --   --   --    APTT  --   --  29.1  --   --   --   --   --   --   --      EGD 11/6/22 with Dr. Edgardo Bain for GI bleed Findings:   Esophagus- Normal.  Stomach- Moderate amount of fresh blood in the stomach  A visible vessel with bleeding was seen in the mid to distal antrum slightly posteriorly. Initial endo clip did not take. A second endo clip placed at base,  but still with some bleeding. Another clip was placed with slowing of bleeding.   Duodenum- Normal.  Therapies: Endo clip  Specimens: None  Estimated Blood Loss: active bleeding  IMPRESSION: Antral gastric bleeding/visible vessel. Endo clip technique used. Stilll with some bleeding. Recommendations:  Kcentra per IM. Discussed with Dr Omaira Lmoax. NPO. Frequent labs. PPIs IV . Discussed with IR Dr FATIMAH Giles who would like INR less than 2 to consider embolization. Arteriogram and Embolization with Dr. Anu Rodriguez 11/7/22 for GI bleed  Findings: gda successfully embolized    Assessment:     Principal Problem:    Acute upper gastrointestinal hemorrhage  Active Problems:    ABLA (acute blood loss anemia)    Warfarin anticoagulation    Hypercoagulable state, secondary ([address]    Chronic bacterial endocarditis    History of mitral valve replacement with mechanical valve    Cardiac pacemaker    History of aortic valve replacement with bioprosthetic valve    Chronic systolic heart failure (HCC)    Permanent atrial fibrillation ([address]  Resolved Problems:    * No resolved hospital problems. *      77 y.o. female with PMH significant for COVID early 2022, chronic bacterial endocarditis on lifelong Cefadoxil, On chronic Warfarin for Afib and hx mechanical MVR, hx Bovine AVR, chronic CHF (followed by Dr. Shea Morris), pacemaker in situ- seen in GI consult 11/6 for GIB after pt presented with hematemesis and bloody BMS. Cologuard 9/2022 was negative. She had just started taking Prilosec for dyspeptic symptoms. In the ER her hgb was 9.2 from Hgb 11.2 in 7/2022. INR was 2.5. Received Kcentra/IV Vit K for reversal of warfarin. S/p EGD 11/6 as outlined above and IR arteriogram with GDA successfully embolized morning of 11/7.      11/9: Has received total transfusion of 2u pRBCs this admission. Last transfused 1u pRBCs on 11/8 for Hgb 7.1 with improvement to Hgb 8.3. This AM Hgb 7.7. No GIB. No BM. No GI complaints. Only c/o sinus HA. Plan:     - Wish Ms Roger Benítez today, 11/9  - Suspect mild drop in Hgb this AM is likely dilutional and again no overt GI bleeding noted.   Will continue to follow trend of H/H closely. If further downtrend of H/H with no clear explanation- recommend CT to r/o other possible source of bleed such as retroperitoneal hematoma  - IV PPI  - Will gradually advance diet. Jayson Bradford PA-C  [facility]    Patient is seen and examined in collaboration with Dr. Ravindra Ramos. Assessment and plan as per Dr. Ravindra Ramos.

## 2022-11-09 NOTE — PROGRESS NOTES
Pt resting, denies needs at this time, NAD. Heparin drip remains therapeutic, no rate change, see MAR. Hourly rounds completed. Bed in L/L position, call light and personal items within reach. Will continue to monitor and give bedside report to oncoming nurse.

## 2022-11-09 NOTE — PROGRESS NOTES
Hospitalist Progress Note   Admit Date:  2022  6:51 PM   Name:  Margot Chin   Age:  69 y.o. Sex:  female  :  1945   MRN:  [id number]   Room:  [facility]/    Presenting Complaint: GI Bleeding     Reason(s) for Admission: Acute upper gastrointestinal hemorrhage [K92.2]  UGIB (upper gastrointestinal bleed) [facility] Course:   Margot Chin is a 77 y.o. 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 Also had maroon red loose stools. Has lightheadedness, dizziness with position change but if lying supine she is stable. She is on warfarin and ASA. Denies use of NSAIDs. No prior h/o GIB. Denies abdominal pain. Normal cologuard on 22. Rec'd plasma after open heart surgery 20 years ago and had allergic rxn. Both daughter and patient unsure what type of plasma. BP soft while en route 100/50s with positive orthostatics. In ED, patient's MAP 68-70. Fecal occult positive. Scr 0.9 BUN 42 hgb 9.2  (BL 11-12)PT 27.3 INR 2.5. she was loaded with protonix 80 mg IV and both hospitalist & GI were consulted for admission. Subjective & 24hr Events (22):   Hg slightly lower. Patient stable overnight      Assessment & Plan:   Acute upper GI bleed   Underwent emergent EGD( ) and had endoclips x2 place in distal antrum of stomach. Then underwent embolization by IR ()  Hg stable  IV PPI  Appreciate GI's assistance    Will continue to monitor Hg after blood transfusion.   If acute drop will stop heparin drip and order CT abd and pelvis to eval for retroperitoneal hematoma    Mechanical valve  Due to chronic bacterial endocarditis  Appreciate Cardiology recs  Heparin drip started and recommend full anticoagulation if INR < 2.5 to prevent CVA    Will hold coumadin    Bioprosthetic aortic valve  Can hold ASA for now but will need to be restarted in the future per Cardiology. Likely as an outpatient    Permanent afib  Rate controled (has pacemaker)  On Heparin drip    Secondary hypercoagulable state  Will need to be restarted on Coumadin due to Mechanical Mitral Valve    Chronic bacterial endocarditis  Cefadroxil not on formulary she will be continued on Keflex    Chronic HFrEF  Compensated  Holding ARB given soft blood pressure. Will continue CoReg    Hypothyroid  Synthroid      Anticipated discharge needs:      Pending hospital course    Diet:  ADULT DIET; Regular; Low Fat/Low Chol/High Fiber/2 gm Na; No red dye  DVT PPx: Heparin drip  Code status: Full Code    Hospital Problems:  Principal Problem:    Acute upper gastrointestinal hemorrhage  Active Problems:    ABLA (acute blood loss anemia)    Warfarin anticoagulation    Hypercoagulable state, secondary ([address]    Chronic bacterial endocarditis    History of mitral valve replacement with mechanical valve    Cardiac pacemaker    History of aortic valve replacement with bioprosthetic valve    Chronic systolic heart failure (HCC)    Permanent atrial fibrillation ([address]  Resolved Problems:    * No resolved hospital problems.  *      Objective:   Patient Vitals for the past 24 hrs:   Temp Pulse Resp BP SpO2   11/09/22 1600 97.7 °F (36.5 °C) 71 16 (!) 125/50 94 %   11/09/22 1211 97.5 °F (36.4 °C) 74 18 (!) 135/52 100 %   11/09/22 0752 97.5 °F (36.4 °C) 70 16 (!) 136/58 96 %   11/09/22 0344 98.2 °F (36.8 °C) 74 18 (!) 121/47 91 %   11/08/22 2346 98.4 °F (36.9 °C) 71 18 (!) 116/54 96 %   11/08/22 1942 97.8 °F (36.6 °C) 71 22 (!) 119/53 --       Oxygen Therapy  SpO2: 94 %  Pulse via Oximetry: 70 beats per minute  Pulse Oximeter Device Mode: Continuous  Pulse Oximeter Device Location: Right, Finger  O2 Device: None (Room air)  O2 Flow Rate (L/min): 4 L/min    Estimated body mass index is 32.2 kg/m² as calculated from the following:    Height as of this encounter: 5' 6\" (1.676 m). Weight as of this encounter: 199 lb 8.3 oz (90.5 kg). No intake or output data in the 24 hours ending 11/09/22 1627        Physical Exam:     Blood pressure (!) 125/50, pulse 71, temperature 97.7 °F (36.5 °C), temperature source Oral, resp. rate 16, height 5' 6\" (1.676 m), weight 199 lb 8.3 oz (90.5 kg), SpO2 94 %. General:    Well nourished. Head:  Normocephalic, atraumatic  Eyes:  Sclerae appear normal.  Pupils equally round. ENT:  Nares appear normal, no drainage. Moist oral mucosa  Neck:  No restricted ROM. Trachea midline   CV:   RRR. No m/r/g. No jugular venous distension. Lungs:   CTAB. No wheezing, rhonchi, or rales. Symmetric expansion. Abdomen: Bowel sounds present. Soft, nontender, nondistended. Extremities: No cyanosis or clubbing. No edema  Skin:     No rashes and normal coloration. Warm and dry. Neuro:  CN II-XII grossly intact. Sensation intact. A&Ox3  Psych:  Normal mood and affect.       I have personally reviewed labs and tests showing:  Recent Labs:  Recent Results (from the past 48 hour(s))   Anti-Xa, Unfractionated Heparin    Collection Time: 11/07/22  8:12 PM   Result Value Ref Range    Anti-XA Unfrac Heparin 0.47 0.3 - 0.7 IU/mL   Hemoglobin and Hematocrit    Collection Time: 11/07/22  8:12 PM   Result Value Ref Range    Hemoglobin 7.8 (L) 11.7 - 15.4 g/dL    Hematocrit 23.7 (L) 35.8 - 46.3 %   Hemoglobin and Hematocrit    Collection Time: 11/07/22 10:39 PM   Result Value Ref Range    Hemoglobin 8.0 (L) 11.7 - 15.4 g/dL    Hematocrit 24.3 (L) 35.8 - 46.3 %   Anti-Xa, Unfractionated Heparin    Collection Time: 11/07/22 10:39 PM   Result Value Ref Range    Anti-XA Unfrac Heparin 0.51 0.3 - 0.7 IU/mL   CBC with Auto Differential    Collection Time: 11/08/22  4:46 AM   Result Value Ref Range    WBC 8.2 4.3 - 11.1 K/uL    RBC 2.35 (L) 4.05 - 5.2 M/uL    Hemoglobin 7.1 (L) 11.7 - 15.4 g/dL    Hematocrit 22.1 (L) 35.8 - 46.3 %    MCV 94.0 82 - 102 FL    MCH 30.2 26.1 - 32.9 PG    MCHC 32.1 31.4 - 35.0 g/dL    RDW 13.7 11.9 - 14.6 %    Platelets 565 (L) 589 - 450 K/uL    MPV 11.3 9.4 - 12.3 FL    nRBC 0.00 0.0 - 0.2 K/uL    Differential Type AUTOMATED      Seg Neutrophils 72 43 - 78 %    Lymphocytes 20 13 - 44 %    Monocytes 6 4.0 - 12.0 %    Eosinophils % 1 0.5 - 7.8 %    Basophils 0 0.0 - 2.0 %    Immature Granulocytes 1 0.0 - 5.0 %    Segs Absolute 6.0 1.7 - 8.2 K/UL    Absolute Lymph # 1.6 0.5 - 4.6 K/UL    Absolute Mono # 0.5 0.1 - 1.3 K/UL    Absolute Eos # 0.1 0.0 - 0.8 K/UL    Basophils Absolute 0.0 0.0 - 0.2 K/UL    Absolute Immature Granulocyte 0.0 0.0 - 0.5 K/UL   Basic Metabolic Panel w/ Reflex to MG    Collection Time: 11/08/22  4:46 AM   Result Value Ref Range    Sodium 144 (H) 133 - 143 mmol/L    Potassium 4.0 3.5 - 5.1 mmol/L    Chloride 113 (H) 101 - 110 mmol/L    CO2 25 21 - 32 mmol/L    Anion Gap 6 2 - 11 mmol/L    Glucose 85 65 - 100 mg/dL    BUN 26 (H) 8 - 23 MG/DL    Creatinine 1.00 0.6 - 1.0 MG/DL    Est, Glom Filt Rate 59 (L) >60 ml/min/1.73m2    Calcium 8.4 8.3 - 10.4 MG/DL   Anti-Xa, Unfractionated Heparin    Collection Time: 11/08/22  4:46 AM   Result Value Ref Range    Anti-XA Unfrac Heparin 0.38 0.3 - 0.7 IU/mL   Hemoglobin and Hematocrit    Collection Time: 11/08/22  8:24 AM   Result Value Ref Range    Hemoglobin 7.1 (L) 11.7 - 15.4 g/dL    Hematocrit 21.8 (L) 35.8 - 46.3 %   PREPARE RBC (CROSSMATCH), 1 Units    Collection Time: 11/08/22 10:00 AM   Result Value Ref Range    History Check Historical check performed    Hemoglobin and Hematocrit    Collection Time: 11/08/22  3:09 PM   Result Value Ref Range    Hemoglobin 8.3 (L) 11.7 - 15.4 g/dL    Hematocrit 25.1 (L) 35.8 - 46.3 % CBC with Auto Differential    Collection Time: 11/09/22  5:57 AM   Result Value Ref Range    WBC 5.9 4.3 - 11.1 K/uL    RBC 2.59 (L) 4.05 - 5.2 M/uL    Hemoglobin 7.7 (L) 11.7 - 15.4 g/dL    Hematocrit 23.7 (L) 35.8 - 46.3 %    MCV 91.5 82 - 102 FL    MCH 29.7 26.1 - 32.9 PG    MCHC 32.5 31.4 - 35.0 g/dL    RDW 15.0 (H) 11.9 - 14.6 %    Platelets 391 (L) 805 - 450 K/uL    MPV 11.0 9.4 - 12.3 FL    nRBC 0.00 0.0 - 0.2 K/uL    Differential Type AUTOMATED      Seg Neutrophils 65 43 - 78 %    Lymphocytes 23 13 - 44 %    Monocytes 8 4.0 - 12.0 %    Eosinophils % 2 0.5 - 7.8 %    Basophils 1 0.0 - 2.0 %    Immature Granulocytes 1 0.0 - 5.0 %    Segs Absolute 3.9 1.7 - 8.2 K/UL    Absolute Lymph # 1.3 0.5 - 4.6 K/UL    Absolute Mono # 0.5 0.1 - 1.3 K/UL    Absolute Eos # 0.1 0.0 - 0.8 K/UL    Basophils Absolute 0.0 0.0 - 0.2 K/UL    Absolute Immature Granulocyte 0.1 0.0 - 0.5 K/UL   Basic Metabolic Panel w/ Reflex to MG    Collection Time: 11/09/22  5:57 AM   Result Value Ref Range    Sodium 143 133 - 143 mmol/L    Potassium 4.1 3.5 - 5.1 mmol/L    Chloride 113 (H) 101 - 110 mmol/L    CO2 26 21 - 32 mmol/L    Anion Gap 4 2 - 11 mmol/L    Glucose 87 65 - 100 mg/dL    BUN 16 8 - 23 MG/DL    Creatinine 0.90 0.6 - 1.0 MG/DL    Est, Glom Filt Rate >60 >60 ml/min/1.73m2    Calcium 8.5 8.3 - 10.4 MG/DL   Anti-Xa, Unfractionated Heparin    Collection Time: 11/09/22  5:57 AM   Result Value Ref Range    Anti-XA Unfrac Heparin 0.52 0.3 - 0.7 IU/mL       I have personally reviewed imaging studies showing: Other Studies:  IR EMBOLIZATION HEMORRHAGE   Final Result   1. Upper mesenteric arteriogram as above. The gastroduodenal artery was   embolized as this feeds the area of concern and the endoscopic clip. Plan:  3 hours bedrest. Continue to monitor hemoglobin.           Current Meds:  Current Facility-Administered Medications   Medication Dose Route Frequency    pseudoephedrine (SUDAFED) tablet 60 mg  60 mg Oral Q6H PRN heparin (porcine) injection 4,000 Units  4,000 Units IntraVENous PRN    heparin (porcine) injection 2,000 Units  2,000 Units IntraVENous PRN    heparin 25,000 units in dextrose 5% 250 mL (premix) infusion  5-30 Units/kg/hr IntraVENous Continuous    sodium chloride flush 0.9 % injection 5-40 mL  5-40 mL IntraVENous 2 times per day    sodium chloride flush 0.9 % injection 5-40 mL  5-40 mL IntraVENous PRN    ondansetron (ZOFRAN-ODT) disintegrating tablet 4 mg  4 mg Oral Q8H PRN    Or    ondansetron (ZOFRAN) injection 4 mg  4 mg IntraVENous Q6H PRN    acetaminophen (TYLENOL) tablet 650 mg  650 mg Oral Q6H PRN    Or    acetaminophen (TYLENOL) suppository 650 mg  650 mg Rectal Q6H PRN    pantoprazole (PROTONIX) 40 mg in sodium chloride (PF) 0.9 % 10 mL injection  40 mg IntraVENous Q12H    cephALEXin (KEFLEX) capsule 500 mg  500 mg Oral 4 times per day    levothyroxine (SYNTHROID) tablet 50 mcg  50 mcg Oral Daily    [Held by provider] carvedilol (COREG) tablet 3.125 mg  3.125 mg Oral BID WC    lactated ringers infusion   IntraVENous Continuous       Signed:  Sonny Solis MD    Part of this note may have been written by using a voice dictation software. The note has been proof read but may still contain some grammatical/other typographical errors.

## 2022-11-10 ENCOUNTER — APPOINTMENT (OUTPATIENT)
Dept: GENERAL RADIOLOGY | Age: 77
DRG: 377 | End: 2022-11-10
Payer: MEDICARE

## 2022-11-10 ENCOUNTER — APPOINTMENT (OUTPATIENT)
Dept: NON INVASIVE DIAGNOSTICS | Age: 77
DRG: 377 | End: 2022-11-10
Payer: MEDICARE

## 2022-11-10 LAB
ANION GAP SERPL CALC-SCNC: 1 MMOL/L (ref 2–11)
BASOPHILS # BLD: 0 K/UL (ref 0–0.2)
BASOPHILS NFR BLD: 0 % (ref 0–2)
BUN SERPL-MCNC: 12 MG/DL (ref 8–23)
CALCIUM SERPL-MCNC: 8.6 MG/DL (ref 8.3–10.4)
CHLORIDE SERPL-SCNC: 112 MMOL/L (ref 101–110)
CO2 SERPL-SCNC: 28 MMOL/L (ref 21–32)
CREAT SERPL-MCNC: 0.9 MG/DL (ref 0.6–1)
DIFFERENTIAL METHOD BLD: ABNORMAL
ECHO AO ASC DIAM: 3.6 CM
ECHO AO ASCENDING AORTA INDEX: 1.8 CM/M2
ECHO AO ROOT DIAM: 2.7 CM
ECHO AO ROOT INDEX: 1.35 CM/M2
ECHO AV ACCELERATION TIME: 88 MS
ECHO AV AREA PEAK VELOCITY: 0.9 CM2
ECHO AV AREA VTI: 1 CM2
ECHO AV AREA/BSA PEAK VELOCITY: 0.5 CM2/M2
ECHO AV AREA/BSA VTI: 0.5 CM2/M2
ECHO AV MEAN GRADIENT: 22 MMHG
ECHO AV MEAN VELOCITY: 2.2 M/S
ECHO AV PEAK GRADIENT: 43 MMHG
ECHO AV PEAK VELOCITY: 3.3 M/S
ECHO AV VELOCITY RATIO: 0.33
ECHO AV VTI: 70 CM
ECHO BSA: 1.97 M2
ECHO IVC PROX: 1.9 CM
ECHO LA AREA 2C: 32.8 CM2
ECHO LA AREA 4C: 38.2 CM2
ECHO LA DIAMETER INDEX: 2.85 CM/M2
ECHO LA DIAMETER: 5.7 CM
ECHO LA MAJOR AXIS: 8.4 CM
ECHO LA MINOR AXIS: 7.6 CM
ECHO LA TO AORTIC ROOT RATIO: 2.11
ECHO LA VOL 2C: 114 ML (ref 22–52)
ECHO LA VOL 4C: 143 ML (ref 22–52)
ECHO LA VOL BP: 133 ML (ref 22–52)
ECHO LA VOL/BSA BIPLANE: 67 ML/M2 (ref 16–34)
ECHO LA VOLUME INDEX A2C: 57 ML/M2 (ref 16–34)
ECHO LA VOLUME INDEX A4C: 72 ML/M2 (ref 16–34)
ECHO LV E' LATERAL VELOCITY: 7 CM/S
ECHO LV EDV A2C: 70 ML
ECHO LV EDV A4C: 80 ML
ECHO LV EDV INDEX A4C: 40 ML/M2
ECHO LV EDV NDEX A2C: 35 ML/M2
ECHO LV EJECTION FRACTION A2C: 55 %
ECHO LV EJECTION FRACTION A4C: 59 %
ECHO LV EJECTION FRACTION BIPLANE: 56 % (ref 55–100)
ECHO LV ESV A2C: 32 ML
ECHO LV ESV A4C: 33 ML
ECHO LV ESV INDEX A2C: 16 ML/M2
ECHO LV ESV INDEX A4C: 17 ML/M2
ECHO LV FRACTIONAL SHORTENING: 28 % (ref 28–44)
ECHO LV INTERNAL DIMENSION DIASTOLE INDEX: 2.5 CM/M2
ECHO LV INTERNAL DIMENSION DIASTOLIC: 5 CM (ref 3.9–5.3)
ECHO LV INTERNAL DIMENSION SYSTOLIC INDEX: 1.8 CM/M2
ECHO LV INTERNAL DIMENSION SYSTOLIC: 3.6 CM
ECHO LV IVSD: 0.9 CM (ref 0.6–0.9)
ECHO LV MASS 2D: 169.9 G (ref 67–162)
ECHO LV MASS INDEX 2D: 85 G/M2 (ref 43–95)
ECHO LV POSTERIOR WALL DIASTOLIC: 1 CM (ref 0.6–0.9)
ECHO LV RELATIVE WALL THICKNESS RATIO: 0.4
ECHO LVOT AREA: 2.8 CM2
ECHO LVOT AV VTI INDEX: 0.35
ECHO LVOT DIAM: 1.9 CM
ECHO LVOT MEAN GRADIENT: 2 MMHG
ECHO LVOT PEAK GRADIENT: 5 MMHG
ECHO LVOT PEAK VELOCITY: 1.1 M/S
ECHO LVOT STROKE VOLUME INDEX: 34.9 ML/M2
ECHO LVOT SV: 69.7 ML
ECHO LVOT VTI: 24.6 CM
ECHO MV AREA VTI: 1.4 CM2
ECHO MV E DECELERATION TIME (DT): 243 MS
ECHO MV E VELOCITY: 2.31 M/S
ECHO MV E/E' LATERAL: 33
ECHO MV LVOT VTI INDEX: 2.01
ECHO MV MAX VELOCITY: 2.5 M/S
ECHO MV MEAN GRADIENT: 9 MMHG
ECHO MV MEAN VELOCITY: 1.3 M/S
ECHO MV PEAK GRADIENT: 25 MMHG
ECHO MV VTI: 49.5 CM
ECHO PULMONARY ARTERY END DIASTOLIC PRESSURE: 4 MMHG
ECHO PV ACCELERATION TIME (AT): 111 MS
ECHO PV MAX VELOCITY: 1.4 M/S
ECHO PV PEAK GRADIENT: 8 MMHG
ECHO PV REGURGITANT MAX VELOCITY: 1 M/S
ECHO RV BASAL DIMENSION: 3.6 CM
ECHO RV FREE WALL PEAK S': 9 CM/S
ECHO RV INTERNAL DIMENSION: 2.8 CM
ECHO RV TAPSE: 1.3 CM (ref 1.7–?)
ECHO TV REGURGITANT MAX VELOCITY: 2.62 M/S
ECHO TV REGURGITANT PEAK GRADIENT: 27 MMHG
EOSINOPHIL # BLD: 0.2 K/UL (ref 0–0.8)
EOSINOPHIL NFR BLD: 3 % (ref 0.5–7.8)
ERYTHROCYTE [DISTWIDTH] IN BLOOD BY AUTOMATED COUNT: 14.6 % (ref 11.9–14.6)
GLUCOSE SERPL-MCNC: 94 MG/DL (ref 65–100)
HCT VFR BLD AUTO: 24.4 % (ref 35.8–46.3)
HCT VFR BLD AUTO: 25.6 % (ref 35.8–46.3)
HGB BLD-MCNC: 7.8 G/DL (ref 11.7–15.4)
HGB BLD-MCNC: 8.4 G/DL (ref 11.7–15.4)
IMM GRANULOCYTES # BLD AUTO: 0 K/UL (ref 0–0.5)
IMM GRANULOCYTES NFR BLD AUTO: 0 % (ref 0–5)
LV EF: 58 %
LVEF MODALITY: ABNORMAL
LYMPHOCYTES # BLD: 1.2 K/UL (ref 0.5–4.6)
LYMPHOCYTES NFR BLD: 24 % (ref 13–44)
MCH RBC QN AUTO: 29.7 PG (ref 26.1–32.9)
MCHC RBC AUTO-ENTMCNC: 32 G/DL (ref 31.4–35)
MCV RBC AUTO: 92.8 FL (ref 82–102)
MONOCYTES # BLD: 0.5 K/UL (ref 0.1–1.3)
MONOCYTES NFR BLD: 10 % (ref 4–12)
NEUTS SEG # BLD: 3.2 K/UL (ref 1.7–8.2)
NEUTS SEG NFR BLD: 63 % (ref 43–78)
NRBC # BLD: 0 K/UL (ref 0–0.2)
NT PRO BNP: 4607 PG/ML
PLATELET # BLD AUTO: 133 K/UL (ref 150–450)
PMV BLD AUTO: 11.3 FL (ref 9.4–12.3)
POTASSIUM SERPL-SCNC: 3.6 MMOL/L (ref 3.5–5.1)
RBC # BLD AUTO: 2.63 M/UL (ref 4.05–5.2)
SODIUM SERPL-SCNC: 141 MMOL/L (ref 133–143)
UFH PPP CHRO-ACNC: 0.32 IU/ML (ref 0.3–0.7)
WBC # BLD AUTO: 5.1 K/UL (ref 4.3–11.1)

## 2022-11-10 PROCEDURE — 97530 THERAPEUTIC ACTIVITIES: CPT

## 2022-11-10 PROCEDURE — 99232 SBSQ HOSP IP/OBS MODERATE 35: CPT | Performed by: INTERNAL MEDICINE

## 2022-11-10 PROCEDURE — 6360000002 HC RX W HCPCS: Performed by: INTERNAL MEDICINE

## 2022-11-10 PROCEDURE — 83880 ASSAY OF NATRIURETIC PEPTIDE: CPT

## 2022-11-10 PROCEDURE — 71045 X-RAY EXAM CHEST 1 VIEW: CPT

## 2022-11-10 PROCEDURE — 93306 TTE W/DOPPLER COMPLETE: CPT

## 2022-11-10 PROCEDURE — 85520 HEPARIN ASSAY: CPT

## 2022-11-10 PROCEDURE — 6370000000 HC RX 637 (ALT 250 FOR IP): Performed by: FAMILY MEDICINE

## 2022-11-10 PROCEDURE — 6360000002 HC RX W HCPCS: Performed by: FAMILY MEDICINE

## 2022-11-10 PROCEDURE — 93306 TTE W/DOPPLER COMPLETE: CPT | Performed by: INTERNAL MEDICINE

## 2022-11-10 PROCEDURE — 2580000003 HC RX 258: Performed by: INTERNAL MEDICINE

## 2022-11-10 PROCEDURE — 85014 HEMATOCRIT: CPT

## 2022-11-10 PROCEDURE — 85025 COMPLETE CBC W/AUTO DIFF WBC: CPT

## 2022-11-10 PROCEDURE — A4216 STERILE WATER/SALINE, 10 ML: HCPCS | Performed by: FAMILY MEDICINE

## 2022-11-10 PROCEDURE — 36415 COLL VENOUS BLD VENIPUNCTURE: CPT

## 2022-11-10 PROCEDURE — 1100000000 HC RM PRIVATE

## 2022-11-10 PROCEDURE — 80048 BASIC METABOLIC PNL TOTAL CA: CPT

## 2022-11-10 PROCEDURE — 2580000003 HC RX 258: Performed by: FAMILY MEDICINE

## 2022-11-10 PROCEDURE — C9113 INJ PANTOPRAZOLE SODIUM, VIA: HCPCS | Performed by: FAMILY MEDICINE

## 2022-11-10 RX ORDER — FUROSEMIDE 10 MG/ML
20 INJECTION INTRAMUSCULAR; INTRAVENOUS ONCE
Status: DISCONTINUED | OUTPATIENT
Start: 2022-11-10 | End: 2022-11-10

## 2022-11-10 RX ORDER — FUROSEMIDE 10 MG/ML
40 INJECTION INTRAMUSCULAR; INTRAVENOUS 2 TIMES DAILY
Status: DISCONTINUED | OUTPATIENT
Start: 2022-11-10 | End: 2022-11-11

## 2022-11-10 RX ADMIN — SODIUM CHLORIDE 40 MG: 9 INJECTION INTRAMUSCULAR; INTRAVENOUS; SUBCUTANEOUS at 18:11

## 2022-11-10 RX ADMIN — LEVOTHYROXINE SODIUM 50 MCG: 0.05 TABLET ORAL at 04:59

## 2022-11-10 RX ADMIN — ACETAMINOPHEN 650 MG: 325 TABLET ORAL at 05:14

## 2022-11-10 RX ADMIN — CARVEDILOL 3.12 MG: 3.12 TABLET, FILM COATED ORAL at 17:15

## 2022-11-10 RX ADMIN — FUROSEMIDE 40 MG: 10 INJECTION, SOLUTION INTRAMUSCULAR; INTRAVENOUS at 12:37

## 2022-11-10 RX ADMIN — CEPHALEXIN 500 MG: 500 CAPSULE ORAL at 11:41

## 2022-11-10 RX ADMIN — SODIUM CHLORIDE, PRESERVATIVE FREE 10 ML: 5 INJECTION INTRAVENOUS at 20:49

## 2022-11-10 RX ADMIN — SODIUM CHLORIDE 40 MG: 9 INJECTION INTRAMUSCULAR; INTRAVENOUS; SUBCUTANEOUS at 05:02

## 2022-11-10 RX ADMIN — CEPHALEXIN 500 MG: 500 CAPSULE ORAL at 05:00

## 2022-11-10 RX ADMIN — ACETAMINOPHEN 650 MG: 325 TABLET ORAL at 20:59

## 2022-11-10 RX ADMIN — SODIUM CHLORIDE, SODIUM LACTATE, POTASSIUM CHLORIDE, AND CALCIUM CHLORIDE: 600; 310; 30; 20 INJECTION, SOLUTION INTRAVENOUS at 05:14

## 2022-11-10 RX ADMIN — FUROSEMIDE 40 MG: 10 INJECTION, SOLUTION INTRAMUSCULAR; INTRAVENOUS at 17:18

## 2022-11-10 RX ADMIN — CEPHALEXIN 500 MG: 500 CAPSULE ORAL at 17:15

## 2022-11-10 ASSESSMENT — PAIN DESCRIPTION - LOCATION
LOCATION: HEAD
LOCATION: HEAD

## 2022-11-10 ASSESSMENT — PAIN SCALES - GENERAL
PAINLEVEL_OUTOF10: 0
PAINLEVEL_OUTOF10: 2
PAINLEVEL_OUTOF10: 2

## 2022-11-10 ASSESSMENT — PAIN DESCRIPTION - DESCRIPTORS
DESCRIPTORS: ACHING
DESCRIPTORS: ACHING

## 2022-11-10 NOTE — PROGRESS NOTES
ACUTE PHYSICAL THERAPY GOALS:   (Developed with and agreed upon by patient and/or caregiver.)     (1.) [place]  will move from supine to sit and sit to supine  with INDEPENDENCE within 7 treatment day(s). (2.) [place] will transfer from bed to chair and chair to bed with INDEPENDENCE using the least restrictive device within 7 treatment day(s). (3.) [place] will ambulate with INDEPENDENCE for 500+ feet with the least restrictive device within 7 treatment day(s). (4.) [place] will perform standing static and dynamic balance activities x 20 minutes with SUPERVISION to improve safety within 7 treatment day(s). (5.) [place] will ascend and descend 1 stairs using 0 hand rail(s) with SUPERVISION to improve functional mobility and safety within 7 treatment day(s). (6.) [place] will perform bilateral lower extremity exercises x 20 min for HEP with INDEPENDENCE to improve strength, endurance, and functional mobility within 7 treatment day(s). PHYSICAL THERAPY: Daily Note AM   (Link to Caseload Tracking: PT Visit Days : 2  Time In/Out PT Charge Capture  Rehab Caseload Tracker  Orders    Arben Rockwell is a 69 y.o. female   PRIMARY DIAGNOSIS: Acute upper gastrointestinal hemorrhage  Acute upper gastrointestinal hemorrhage [K92.2]  UGIB (upper gastrointestinal bleed) [K92.2]  Procedure(s) (LRB):  EGD CONTROL HEMORRHAGE/clipping (N/A)  4 Days Post-Op  Inpatient: Payor: MEDICARE / Plan: MEDICARE PART A AND B / Product Type: *No Product type* /     ASSESSMENT:     REHAB RECOMMENDATIONS:   Recommendation to date pending progress:  Setting:  No further skilled therapy after discharge from hospital    Equipment:    To Be Determined     ASSESSMENT:  Ms. Paresh Carver was sitting up in chair on arrival. She is agreeable to PT and states she just took a walk around the floor with her daughter.  Sats were mid 90s on RA at rest. Pt ambulated about 48' with sats dropping to 88% and then to around 86% after gait. She was able to recover without the need for O2. RN notified. Pt moves well and left sitting up in chair with needs in reach.      SUBJECTIVE:   Ms. Elyssa Cheney states, \"I just get short of breath\"     Social/Functional Lives With: Family  Type of Home:  ([facility])  Home Layout: Multi-level, Able to Live on Main level with bedroom/bathroom  Bathroom Shower/Tub: Walk-in shower, Shower chair with back  ADL Assistance: Independent  OBJECTIVE:     PAIN: Desiree Hard / O2: Eveline Kluver / Artis Ashby / Edgar Waynee:   Pre Treatment:  none         Post Treatment: none Vitals        Oxygen RA but sats dropped to 86% with activity     IV    RESTRICTIONS/PRECAUTIONS:        MOBILITY: I Mod I S SBA CGA Min Mod Max Total  NT x2 Comments:   Bed Mobility    Rolling [] [] [] [] [] [] [] [] [] [] []    Supine to Sit [] [] [x] [] [] [] [] [] [] [] []    Scooting [] [] [x] [] [] [] [] [] [] [] []    Sit to Supine [] [] [x] [] [] [] [] [] [] [] []    Transfers    Sit to Stand [] [] [x] [] [] [] [] [] [] [] []    Bed to Chair [] [] [] [x] [] [] [] [] [] [] []    Stand to Sit [] [] [x] [] [] [] [] [] [] [] []     [] [] [] [] [] [] [] [] [] [] []    I=Independent, Mod I=Modified Independent, S=Supervision, SBA=Standby Assistance, CGA=Contact Guard Assistance,   Min=Minimal Assistance, Mod=Moderate Assistance, Max=Maximal Assistance, Total=Total Assistance, NT=Not Tested    BALANCE: Good Fair+ Fair Fair- Poor NT Comments   Sitting Static [x] [] [] [] [] []    Sitting Dynamic [x] [] [] [] [] []              Standing Static [x] [] [] [] [] []    Standing Dynamic [] [x] [] [] [] []      GAIT: I Mod I S SBA CGA Min Mod Max Total  NT x2 Comments:   Level of Assistance [] [] [] [x] [x] [] [] [] [] [] []    Distance 100 feet    DME None    Gait Quality Decreased step clearance and Decreased step length    Weightbearing Status      Stairs      I=Independent, Mod I=Modified Independent, S=Supervision, SBA=Standby Assistance, CGA=Contact Guard Assistance,   Min=Minimal Assistance, Mod=Moderate Assistance, Max=Maximal Assistance, Total=Total Assistance, NT=Not Tested    PLAN:   [address]: 3 times/week for duration of hospital stay or until stated goals are met, whichever comes first.    TREATMENT:   TREATMENT:   Therapeutic Activity (20 Minutes): Therapeutic activity included Supine to Sit, Ambulation on level ground, Sitting balance , and Standing balance to improve functional Activity tolerance, Balance, Mobility, and Strength.     TREATMENT GRID:  N/A    AFTER TREATMENT PRECAUTIONS: Call light within reach, Chair, Needs within reach, and Visitors at bedside    INTERDISCIPLINARY COLLABORATION:  RN/ PCT    EDUCATION:      TIME IN/OUT:  Time In: 1010  Time Out: 1030  Minutes: PINEDA [place]

## 2022-11-10 NOTE — PROGRESS NOTES
[facility] CARDIOLOGY PROGRESS NOTE    11/10/2022 9:44 AM    Admit Date: 11/6/2022        Subjective:   Stable overnight without angina, CHF, or palpitations. Vitals stable and controlled. No other complaints overnight. Tolerating meds well. Objective:      Vitals:    11/09/22 1945 11/10/22 0007 11/10/22 0430 11/10/22 0737   BP: (!) 104/58 131/61 134/63 (!) 148/69   Pulse: 78 73 70 70   Resp:  18 16 16   Temp:  98.1 °F (36.7 °C) 98.6 °F (37 °C) 98.2 °F (36.8 °C)   TempSrc:  Oral Oral Oral   SpO2:  90% 93% 93%   Weight:       Height:           Physical Exam:  Neck- supple, no JVD  CV- regular rate and rhythm, crisp valve click  Lung- clear bilaterally  Abd- soft, nontender, nondistended  Ext- no edema  Skin- warm and dry    Data Review:   Recent Labs     11/09/22  0557 11/09/22  1640 11/10/22  0538     --  141   K 4.1  --  3.6   BUN 16  --  12   WBC 5.9  --  5.1   HGB 7.7* 8.0* 7.8*   HCT 23.7* 24.2* 24.4*   *  --  133*       Assessment and Plan: Active Hospital Problems    Acute upper gastrointestinal hemorrhage    ABLA (acute blood loss anemia)  -Hemoglobin 7.8 today, post transfusion  -Closely monitor counts, per primary/GI. ,  Recheck CBC in the morning, try to keep hemoglobin greater than 8. S/P mitral valve replacement with mechanical valve   S/p aortic valve replacement with a bioprosthetic valve. Warfarin anticoagulation chronically, but persistently anemic male, difficult situation given mechanical mitral valve. On heparin, see above. -Reasonable at this time to continue heparin drip, closely monitor blood counts. - If blood counts abruptly drop may need to hold heparin drip. Recheck daily CBC  -Ideally continue heparin with therapeutic Xa levels while INR is less than 2.5. INR 1.2 three days ago. Resume warfarin in the near future once blood counts stabilize the patient improved clinically.        Permanent atrial fibrillation (HCC)  V paced at 70 bpm with underlying permanent atrial fibrillation  -Anticoagulation plans as above. History of aortic valve replacement with bioprosthetic valve    Chronic bacterial endocarditis prophylaxis.  -On chronic suppressive therapy and afebrile, stable. Essential hypertension  - Medications held due to hypotension. Likely due to hypovolemia/acute blood loss anemia. Reinitiate at discharge as tolerated. Plan today: Blood pressures have improved but still on the low side. Continue to hold carvedilol. Continue to monitor blood levels closely.   Continue to watch for further bleeding       Fredy Suarez MD

## 2022-11-10 NOTE — PROGRESS NOTES
[facility] Progress Note         Admit Date:  11/6/2022    Today's Date:  11/10/2022    CC:  GI bleed    Subjective:     Pt feels well. Does c/o some upper abdominal bloating and questions if this is related to fluids that she is receiving. Denies GI bleeding or other GI complaints. She is tolerating cardiac diet. Hgb stable at 7.8 from Hgb 8.0. She is asking about when she may go home. Understands that this will be determined by Hospitalist and Cardiologist.  She remains on heparin gtt. Discussed anticipation for transition to oral anticoagulation in the near future. Medications:   Current Facility-Administered Medications   Medication Dose Route Frequency    pseudoephedrine (SUDAFED) tablet 60 mg  60 mg Oral Q6H PRN    heparin (porcine) injection 4,000 Units  4,000 Units IntraVENous PRN    heparin (porcine) injection 2,000 Units  2,000 Units IntraVENous PRN    heparin 25,000 units in dextrose 5% 250 mL (premix) infusion  5-30 Units/kg/hr IntraVENous Continuous    sodium chloride flush 0.9 % injection 5-40 mL  5-40 mL IntraVENous 2 times per day    sodium chloride flush 0.9 % injection 5-40 mL  5-40 mL IntraVENous PRN    ondansetron (ZOFRAN-ODT) disintegrating tablet 4 mg  4 mg Oral Q8H PRN    Or    ondansetron (ZOFRAN) injection 4 mg  4 mg IntraVENous Q6H PRN    acetaminophen (TYLENOL) tablet 650 mg  650 mg Oral Q6H PRN    Or    acetaminophen (TYLENOL) suppository 650 mg  650 mg Rectal Q6H PRN    pantoprazole (PROTONIX) 40 mg in sodium chloride (PF) 0.9 % 10 mL injection  40 mg IntraVENous Q12H    cephALEXin (KEFLEX) capsule 500 mg  500 mg Oral 4 times per day    levothyroxine (SYNTHROID) tablet 50 mcg  50 mcg Oral Daily    [Held by provider] carvedilol (COREG) tablet 3.125 mg  3.125 mg Oral BID WC    lactated ringers infusion   IntraVENous Continuous       Review of Systems:  ROS was obtained, with pertinent positives as listed above. No chest pain or SOB.     Diet: Cardiac    Objective:   Vitals:  BP (!) 148/69   Pulse 70   Temp 98.2 °F (36.8 °C) (Oral)   Resp 16   Ht 5' 6\" (1.676 m)   Wt 199 lb 8.3 oz (90.5 kg)   SpO2 93%   BMI 32.20 kg/m²   Intake/Output:  No intake/output data recorded. No intake/output data recorded. Exam:  General appearance: alert, cooperative, no distress  Lungs: Clear to ascultation bilaterally. Heart: RRR   Abdomen: soft, non-distended, non-tender. BSx4 WNL. Neuro:  alert and oriented    Data Review (Labs):    Recent Labs     11/07/22  1427 11/07/22 2012 11/07/22  2239 11/08/22  0446 11/08/22  0824 11/08/22  1509 11/09/22  0557 11/09/22  1640 11/10/22  0538   WBC  --   --   --  8.2  --   --  5.9  --  5.1   HGB 6.9* 7.8* 8.0* 7.1* 7.1* 8.3* 7.7* 8.0* 7.8*   HCT 21.3* 23.7* 24.3* 22.1* 21.8* 25.1* 23.7* 24.2* 24.4*   PLT  --   --   --  119*  --   --  124*  --  133*   MCV  --   --   --  94.0  --   --  91.5  --  92.8   NA  --   --   --  144*  --   --  143  --  141   K  --   --   --  4.0  --   --  4.1  --  3.6   CL  --   --   --  113*  --   --  113*  --  112*   CO2  --   --   --  25  --   --  26  --  28   BUN  --   --   --  26*  --   --  16  --  12     EGD 11/6/22 with Dr. Hong Escudero for GI bleed Findings:   Esophagus- Normal.  Stomach- Moderate amount of fresh blood in the stomach  A visible vessel with bleeding was seen in the mid to distal antrum slightly posteriorly. Initial endo clip did not take. A second endo clip placed at base,  but still with some bleeding. Another clip was placed with slowing of bleeding. Duodenum- Normal.  Therapies: Endo clip  Specimens: None  Estimated Blood Loss: active bleeding  IMPRESSION: Antral gastric bleeding/visible vessel. Endo clip technique used. Stilll with some bleeding. Recommendations:  Kcentra per IM. Discussed with Dr Yanet Kuhn. NPO. Frequent labs. PPIs IV . Discussed with IR Dr FATIMAH Giles who would like INR less than 2 to consider embolization.     Arteriogram and Embolization with  Curt Flores 11/7/22 for GI bleed  Findings: gda successfully embolized    Assessment:     Principal Problem:    Acute upper gastrointestinal hemorrhage  Active Problems:    ABLA (acute blood loss anemia)    Warfarin anticoagulation    Hypercoagulable state, secondary ([address]    Chronic bacterial endocarditis    History of mitral valve replacement with mechanical valve    Cardiac pacemaker    History of aortic valve replacement with bioprosthetic valve    Chronic systolic heart failure (HCC)    Permanent atrial fibrillation ([address]  Resolved Problems:    * No resolved hospital problems. *      77 y.o. female with PMH significant for COVID early 2022, chronic bacterial endocarditis on lifelong Cefadoxil, On chronic Warfarin for Afib and hx mechanical MVR, hx Bovine AVR, chronic CHF (followed by Dr. Ariadne Mejia), pacemaker in situ- seen in GI consult 11/6 for GIB after pt presented with hematemesis and bloody BMS. Cologuard 9/2022 was negative. She had just started taking Prilosec for dyspeptic symptoms. In the ER her hgb was 9.2 from Hgb 11.2 in 7/2022. INR was 2.5. Received Kcentra/IV Vit K for reversal of warfarin. S/p EGD 11/6 as outlined above and IR arteriogram with GDA successfully embolized morning of 11/7.      11/9: Has received total transfusion of 2u pRBCs this admission. Last transfused 1u pRBCs on 11/8 for Hgb 7.1 with improvement to Hgb 8.3. This AM Hgb 7.7. No GIB. No BM. No GI complaints. Only c/o sinus HA.    11/10: Hgb stable at 7.8 from Hgb 8.0, Hgb 7.7, Hgb 8.3. Plt low but improving. Still no GIB. Plan:     - ABLA: Hgb overall stable. Recent mild drop likely dilutional and again no overt GI bleeding noted. Continue to follow trend of H/H closely. If further downtrend of H/H with no clear explanation- recommend CT to r/o other possible source of bleed such as retroperitoneal hematoma  - IV PPI  - Continue Cardiac diet. - Anticoagulation recs per Cardiology.   Will arrange outpatient GI hospital follow up. Advised pt to call our office with questions/concerns between now and then. Jani Rivera PA-C  [facility]    Patient is seen and examined in collaboration with Dr. Elsy Estrada. Assessment and plan as per Dr. Elsy Estrada.

## 2022-11-10 NOTE — PROGRESS NOTES
Hospitalist Progress Note   Admit Date:  2022  6:51 PM   Name:  Lala Solorio   Age:  69 y.o. Sex:  female  :  1945   MRN:  [id number]   Room:      Presenting Complaint: GI Bleeding     Reason(s) for Admission: Acute upper gastrointestinal hemorrhage [K92.2]  UGIB (upper gastrointestinal bleed) [facility] Course:   Lala Solorio is a 77 y.o. 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 Also had maroon red loose stools. Has lightheadedness, dizziness with position change but if lying supine she is stable. She is on warfarin and ASA. Denies use of NSAIDs. No prior h/o GIB. Denies abdominal pain. Normal cologuard on 22. Rec'd plasma after open heart surgery 20 years ago and had allergic rxn. Both daughter and patient unsure what type of plasma. BP soft while en route 100/50s with positive orthostatics. In ED, patient's MAP 68-70. Fecal occult positive. Scr 0.9 BUN 42 hgb 9.2  (BL 11-12)PT 27.3 INR 2.5. she was loaded with protonix 80 mg IV and both hospitalist & GI were consulted for admission. Subjective & 24hr Events (11/10/22): Patient with complaint of sob, new abdominal girth and new lower extremity edema. Assessment & Plan:   Acute upper GI bleed   Underwent emergent EGD( ) and had endoclips x2 place in distal antrum of stomach. Then underwent embolization by IR ()  Hg stable  IV PPI  Appreciate GI's assistance    Hg stablizing ~8.   Will continue to monitor    Acute on chronic HFpEF  Given symptoms above will start iv lasix and strict I+O  Will order echocardiogram    Mechanical valve  Due to chronic bacterial endocarditis  Appreciate Cardiology recs  Heparin drip started and recommend full anticoagulation if INR < 2.5 to prevent CVA    Will hold coumadin    Bioprosthetic aortic valve  Can hold ASA for now but will need to be restarted in the future per Cardiology. Likely as an outpatient    Permanent afib  Rate controled (has pacemaker)  On Heparin drip    Secondary hypercoagulable state  Will need to be restarted on Coumadin due to Mechanical Mitral Valve    Chronic bacterial endocarditis  Cefadroxil not on formulary she will be continued on Keflex    Chronic HFrEF  Compensated  Holding ARB given soft blood pressure. Will continue CoReg    Hypothyroid  Synthroid      Anticipated discharge needs:      Pending hospital course    Diet:  ADULT DIET; Regular; Low Fat/Low Chol/High Fiber/2 gm Na; No red dye  DVT PPx: Heparin drip  Code status: Full Code    Hospital Problems:  Principal Problem:    Acute upper gastrointestinal hemorrhage  Active Problems:    ABLA (acute blood loss anemia)    Warfarin anticoagulation    Hypercoagulable state, secondary ([address]    Chronic bacterial endocarditis    History of mitral valve replacement with mechanical valve    Cardiac pacemaker    History of aortic valve replacement with bioprosthetic valve    Chronic systolic heart failure (HCC)    Permanent atrial fibrillation ([address]  Resolved Problems:    * No resolved hospital problems.  *      Objective:   Patient Vitals for the past 24 hrs:   Temp Pulse Resp BP SpO2   11/10/22 1154 97.9 °F (36.6 °C) 71 16 137/67 99 %   11/10/22 0737 98.2 °F (36.8 °C) 70 16 (!) 148/69 93 %   11/10/22 0430 98.6 °F (37 °C) 70 16 134/63 93 %   11/10/22 0007 98.1 °F (36.7 °C) 73 18 131/61 90 %   11/09/22 1945 -- 78 -- (!) 104/58 --   11/09/22 1912 98.2 °F (36.8 °C) 72 16 (!) 110/45 96 %   11/09/22 1600 97.7 °F (36.5 °C) 71 16 (!) 125/50 94 %   11/09/22 1211 97.5 °F (36.4 °C) 74 18 (!) 135/52 100 %       Oxygen Therapy  SpO2: 99 %  Pulse via Oximetry: 70 beats per minute  Pulse Oximeter Device Mode: Continuous  Pulse Oximeter Device Location: Right, Finger  O2 Device: None (Room air)  O2 Flow Rate (L/min): 4 L/min    Estimated body mass index is 32.2 kg/m² as calculated from the following:    Height as of this encounter: 5' 6\" (1.676 m). Weight as of this encounter: 199 lb 8.3 oz (90.5 kg). No intake or output data in the 24 hours ending 11/10/22 1200        Physical Exam:     Blood pressure 137/67, pulse 71, temperature 97.9 °F (36.6 °C), temperature source Axillary, resp. rate 16, height 5' 6\" (1.676 m), weight 199 lb 8.3 oz (90.5 kg), SpO2 99 %. General:    Well nourished. obese  Head:  Normocephalic, atraumatic  Eyes:  Sclerae appear normal.  Pupils equally round. ENT:  Nares appear normal, no drainage. Moist oral mucosa  Neck:  No restricted ROM. Trachea midline   CV:   RRR. No m/r/g. No jugular venous distension. Lungs:   CTAB. No wheezing, rhonchi, or rales. Symmetric expansion. Abdomen: Bowel sounds present. Soft, nontender, nondistended. Extremities: No cyanosis or clubbing. Bilat lower extremity edema 2 +  Skin:     No rashes and normal coloration. Warm and dry. Neuro:  CN II-XII grossly intact. Sensation intact. A&Ox3  Psych:  Normal mood and affect.       I have personally reviewed labs and tests showing:  Recent Labs:  Recent Results (from the past 48 hour(s))   Hemoglobin and Hematocrit    Collection Time: 11/08/22  3:09 PM   Result Value Ref Range    Hemoglobin 8.3 (L) 11.7 - 15.4 g/dL    Hematocrit 25.1 (L) 35.8 - 46.3 %   CBC with Auto Differential    Collection Time: 11/09/22  5:57 AM   Result Value Ref Range    WBC 5.9 4.3 - 11.1 K/uL    RBC 2.59 (L) 4.05 - 5.2 M/uL    Hemoglobin 7.7 (L) 11.7 - 15.4 g/dL    Hematocrit 23.7 (L) 35.8 - 46.3 %    MCV 91.5 82 - 102 FL    MCH 29.7 26.1 - 32.9 PG    MCHC 32.5 31.4 - 35.0 g/dL RDW 15.0 (H) 11.9 - 14.6 %    Platelets 499 (L) 058 - 450 K/uL    MPV 11.0 9.4 - 12.3 FL    nRBC 0.00 0.0 - 0.2 K/uL    Differential Type AUTOMATED      Seg Neutrophils 65 43 - 78 %    Lymphocytes 23 13 - 44 %    Monocytes 8 4.0 - 12.0 %    Eosinophils % 2 0.5 - 7.8 %    Basophils 1 0.0 - 2.0 %    Immature Granulocytes 1 0.0 - 5.0 %    Segs Absolute 3.9 1.7 - 8.2 K/UL    Absolute Lymph # 1.3 0.5 - 4.6 K/UL    Absolute Mono # 0.5 0.1 - 1.3 K/UL    Absolute Eos # 0.1 0.0 - 0.8 K/UL    Basophils Absolute 0.0 0.0 - 0.2 K/UL    Absolute Immature Granulocyte 0.1 0.0 - 0.5 K/UL   Basic Metabolic Panel w/ Reflex to MG    Collection Time: 11/09/22  5:57 AM   Result Value Ref Range    Sodium 143 133 - 143 mmol/L    Potassium 4.1 3.5 - 5.1 mmol/L    Chloride 113 (H) 101 - 110 mmol/L    CO2 26 21 - 32 mmol/L    Anion Gap 4 2 - 11 mmol/L    Glucose 87 65 - 100 mg/dL    BUN 16 8 - 23 MG/DL    Creatinine 0.90 0.6 - 1.0 MG/DL    Est, Glom Filt Rate >60 >60 ml/min/1.73m2    Calcium 8.5 8.3 - 10.4 MG/DL   Anti-Xa, Unfractionated Heparin    Collection Time: 11/09/22  5:57 AM   Result Value Ref Range    Anti-XA Unfrac Heparin 0.52 0.3 - 0.7 IU/mL   Hemoglobin and Hematocrit    Collection Time: 11/09/22  4:40 PM   Result Value Ref Range    Hemoglobin 8.0 (L) 11.7 - 15.4 g/dL    Hematocrit 24.2 (L) 35.8 - 46.3 %   CBC with Auto Differential    Collection Time: 11/10/22  5:38 AM   Result Value Ref Range    WBC 5.1 4.3 - 11.1 K/uL    RBC 2.63 (L) 4.05 - 5.2 M/uL    Hemoglobin 7.8 (L) 11.7 - 15.4 g/dL    Hematocrit 24.4 (L) 35.8 - 46.3 %    MCV 92.8 82 - 102 FL    MCH 29.7 26.1 - 32.9 PG    MCHC 32.0 31.4 - 35.0 g/dL    RDW 14.6 11.9 - 14.6 %    Platelets 670 (L) 762 - 450 K/uL    MPV 11.3 9.4 - 12.3 FL    nRBC 0.00 0.0 - 0.2 K/uL    Differential Type AUTOMATED      Seg Neutrophils 63 43 - 78 %    Lymphocytes 24 13 - 44 %    Monocytes 10 4.0 - 12.0 %    Eosinophils % 3 0.5 - 7.8 %    Basophils 0 0.0 - 2.0 %    Immature Granulocytes 0 0.0

## 2022-11-11 VITALS
HEART RATE: 73 BPM | RESPIRATION RATE: 20 BRPM | WEIGHT: 199.52 LBS | HEIGHT: 66 IN | DIASTOLIC BLOOD PRESSURE: 57 MMHG | BODY MASS INDEX: 32.06 KG/M2 | SYSTOLIC BLOOD PRESSURE: 101 MMHG | OXYGEN SATURATION: 96 % | TEMPERATURE: 98 F

## 2022-11-11 LAB
ANION GAP SERPL CALC-SCNC: 6 MMOL/L (ref 2–11)
BASOPHILS # BLD: 0 K/UL (ref 0–0.2)
BASOPHILS NFR BLD: 0 % (ref 0–2)
BUN SERPL-MCNC: 17 MG/DL (ref 8–23)
CALCIUM SERPL-MCNC: 9 MG/DL (ref 8.3–10.4)
CHLORIDE SERPL-SCNC: 103 MMOL/L (ref 101–110)
CO2 SERPL-SCNC: 31 MMOL/L (ref 21–32)
CREAT SERPL-MCNC: 1.1 MG/DL (ref 0.6–1)
DIFFERENTIAL METHOD BLD: ABNORMAL
EOSINOPHIL # BLD: 0.2 K/UL (ref 0–0.8)
EOSINOPHIL NFR BLD: 3 % (ref 0.5–7.8)
ERYTHROCYTE [DISTWIDTH] IN BLOOD BY AUTOMATED COUNT: 14.6 % (ref 11.9–14.6)
GLUCOSE SERPL-MCNC: 100 MG/DL (ref 65–100)
HCT VFR BLD AUTO: 26 % (ref 35.8–46.3)
HGB BLD-MCNC: 8.7 G/DL (ref 11.7–15.4)
IMM GRANULOCYTES # BLD AUTO: 0 K/UL (ref 0–0.5)
IMM GRANULOCYTES NFR BLD AUTO: 0 % (ref 0–5)
INR PPP: 1.1
LYMPHOCYTES # BLD: 1.1 K/UL (ref 0.5–4.6)
LYMPHOCYTES NFR BLD: 18 % (ref 13–44)
MAGNESIUM SERPL-MCNC: 2.1 MG/DL (ref 1.8–2.4)
MCH RBC QN AUTO: 30.5 PG (ref 26.1–32.9)
MCHC RBC AUTO-ENTMCNC: 33.5 G/DL (ref 31.4–35)
MCV RBC AUTO: 91.2 FL (ref 82–102)
MONOCYTES # BLD: 0.5 K/UL (ref 0.1–1.3)
MONOCYTES NFR BLD: 8 % (ref 4–12)
NEUTS SEG # BLD: 4.3 K/UL (ref 1.7–8.2)
NEUTS SEG NFR BLD: 71 % (ref 43–78)
NRBC # BLD: 0 K/UL (ref 0–0.2)
PLATELET # BLD AUTO: 160 K/UL (ref 150–450)
PMV BLD AUTO: 11.6 FL (ref 9.4–12.3)
POTASSIUM SERPL-SCNC: 3.3 MMOL/L (ref 3.5–5.1)
PROTHROMBIN TIME: 14.4 SEC (ref 12.6–14.3)
RBC # BLD AUTO: 2.85 M/UL (ref 4.05–5.2)
SODIUM SERPL-SCNC: 140 MMOL/L (ref 133–143)
UFH PPP CHRO-ACNC: 0.36 IU/ML (ref 0.3–0.7)
WBC # BLD AUTO: 6.1 K/UL (ref 4.3–11.1)

## 2022-11-11 PROCEDURE — 85025 COMPLETE CBC W/AUTO DIFF WBC: CPT

## 2022-11-11 PROCEDURE — 99232 SBSQ HOSP IP/OBS MODERATE 35: CPT | Performed by: INTERNAL MEDICINE

## 2022-11-11 PROCEDURE — A4216 STERILE WATER/SALINE, 10 ML: HCPCS | Performed by: FAMILY MEDICINE

## 2022-11-11 PROCEDURE — 6360000002 HC RX W HCPCS: Performed by: INTERNAL MEDICINE

## 2022-11-11 PROCEDURE — 36415 COLL VENOUS BLD VENIPUNCTURE: CPT

## 2022-11-11 PROCEDURE — 6370000000 HC RX 637 (ALT 250 FOR IP): Performed by: INTERNAL MEDICINE

## 2022-11-11 PROCEDURE — 2580000003 HC RX 258: Performed by: FAMILY MEDICINE

## 2022-11-11 PROCEDURE — 6370000000 HC RX 637 (ALT 250 FOR IP): Performed by: FAMILY MEDICINE

## 2022-11-11 PROCEDURE — 85520 HEPARIN ASSAY: CPT

## 2022-11-11 PROCEDURE — 80048 BASIC METABOLIC PNL TOTAL CA: CPT

## 2022-11-11 PROCEDURE — 6360000002 HC RX W HCPCS: Performed by: FAMILY MEDICINE

## 2022-11-11 PROCEDURE — C9113 INJ PANTOPRAZOLE SODIUM, VIA: HCPCS | Performed by: FAMILY MEDICINE

## 2022-11-11 PROCEDURE — 85610 PROTHROMBIN TIME: CPT

## 2022-11-11 PROCEDURE — 83735 ASSAY OF MAGNESIUM: CPT

## 2022-11-11 RX ORDER — POTASSIUM CHLORIDE 20 MEQ/1
20 TABLET, EXTENDED RELEASE ORAL 2 TIMES DAILY WITH MEALS
Status: DISCONTINUED | OUTPATIENT
Start: 2022-11-11 | End: 2022-11-11 | Stop reason: HOSPADM

## 2022-11-11 RX ORDER — FLUTICASONE PROPIONATE 50 MCG
1 SPRAY, SUSPENSION (ML) NASAL DAILY
Status: DISCONTINUED | OUTPATIENT
Start: 2022-11-11 | End: 2022-11-11 | Stop reason: HOSPADM

## 2022-11-11 RX ORDER — ENOXAPARIN SODIUM 100 MG/ML
1 INJECTION SUBCUTANEOUS EVERY 12 HOURS
Status: DISCONTINUED | OUTPATIENT
Start: 2022-11-11 | End: 2022-11-11 | Stop reason: HOSPADM

## 2022-11-11 RX ORDER — PANTOPRAZOLE SODIUM 40 MG/1
40 TABLET, DELAYED RELEASE ORAL
Qty: 60 TABLET | Refills: 0 | Status: SHIPPED | OUTPATIENT
Start: 2022-11-11 | End: 2022-12-11

## 2022-11-11 RX ORDER — CARVEDILOL 3.12 MG/1
3.12 TABLET ORAL 2 TIMES DAILY WITH MEALS
Qty: 60 TABLET | Refills: 3 | Status: SHIPPED | OUTPATIENT
Start: 2022-11-11

## 2022-11-11 RX ORDER — ENOXAPARIN SODIUM 100 MG/ML
1 INJECTION SUBCUTANEOUS 2 TIMES DAILY
Qty: 28 ML | Refills: 0 | Status: SHIPPED | OUTPATIENT
Start: 2022-11-11 | End: 2022-11-18 | Stop reason: ALTCHOICE

## 2022-11-11 RX ADMIN — POTASSIUM CHLORIDE 20 MEQ: 1500 TABLET, EXTENDED RELEASE ORAL at 09:06

## 2022-11-11 RX ADMIN — ENOXAPARIN SODIUM 90 MG: 100 INJECTION SUBCUTANEOUS at 16:05

## 2022-11-11 RX ADMIN — CEPHALEXIN 500 MG: 500 CAPSULE ORAL at 05:00

## 2022-11-11 RX ADMIN — FLUTICASONE PROPIONATE 1 SPRAY: 50 SPRAY, METERED NASAL at 10:50

## 2022-11-11 RX ADMIN — ACETAMINOPHEN 650 MG: 325 TABLET ORAL at 11:45

## 2022-11-11 RX ADMIN — CEPHALEXIN 500 MG: 500 CAPSULE ORAL at 00:05

## 2022-11-11 RX ADMIN — LEVOTHYROXINE SODIUM 50 MCG: 0.05 TABLET ORAL at 05:00

## 2022-11-11 RX ADMIN — CARVEDILOL 3.12 MG: 3.12 TABLET, FILM COATED ORAL at 16:02

## 2022-11-11 RX ADMIN — SODIUM CHLORIDE, PRESERVATIVE FREE 10 ML: 5 INJECTION INTRAVENOUS at 09:07

## 2022-11-11 RX ADMIN — SODIUM CHLORIDE 40 MG: 9 INJECTION INTRAMUSCULAR; INTRAVENOUS; SUBCUTANEOUS at 05:01

## 2022-11-11 RX ADMIN — CEPHALEXIN 500 MG: 500 CAPSULE ORAL at 11:38

## 2022-11-11 RX ADMIN — CARVEDILOL 3.12 MG: 3.12 TABLET, FILM COATED ORAL at 09:06

## 2022-11-11 RX ADMIN — POTASSIUM CHLORIDE 20 MEQ: 1500 TABLET, EXTENDED RELEASE ORAL at 16:02

## 2022-11-11 RX ADMIN — FUROSEMIDE 40 MG: 10 INJECTION, SOLUTION INTRAMUSCULAR; INTRAVENOUS at 09:06

## 2022-11-11 ASSESSMENT — PAIN SCALES - GENERAL: PAINLEVEL_OUTOF10: 3

## 2022-11-11 ASSESSMENT — PAIN DESCRIPTION - DESCRIPTORS: DESCRIPTORS: ACHING

## 2022-11-11 NOTE — DISCHARGE SUMMARY
Hospitalist Discharge Summary   Admit Date:  2022  6:51 PM   DC Note date: 2022  Name:  Smith Canada   Age:  69 y.o. Sex:  female  :  1945   MRN:  [id number]   Room:  [facility]  PCP:  David Byrne MD    Presenting Complaint: GI Bleeding     Initial Admission Diagnosis: Acute upper gastrointestinal hemorrhage [K92.2]  UGIB (upper gastrointestinal bleed) [K92.2]     Problem List for this Hospitalization (present on admission):    Principal Problem:    Acute upper gastrointestinal hemorrhage  Active Problems:    ABLA (acute blood loss anemia)    Warfarin anticoagulation    Hypercoagulable state, secondary ([facility]    Chronic bacterial endocarditis    History of mitral valve replacement with mechanical valve    Cardiac pacemaker    History of aortic valve replacement with bioprosthetic valve    Chronic systolic heart failure (HCC)    Permanent atrial fibrillation ([facility]  Resolved Problems:    * No resolved hospital problems. *      Hospital Course:  Smith Canada is a 77 y.o. female with medical history of chronic bacterial endocarditis on cefadroxil lifelong, permenant AFIB on warfarin, bovine aortic valve replacement, mechanical MVR, Tricuspid valve d/o s/p annuloplasty ring, cardiac pace maker, chronic systolic heart failure, HTN, HLD who presented with from home via EMS with c/o coffee ground emesis described as dark red with red chunks that she thought was realted to the prolise that she was recently started on 5 days ago due to increased gas. Also had maroon red loose stools. Has lightheadedness, dizziness with position change but if lying supine she is stable. She is on warfarin and ASA. Denies use of NSAIDs. No prior h/o GIB. Denies abdominal pain. Normal cologuard on 22. Rec'd plasma after open heart surgery 20 years ago and had allergic rxn. Both daughter and patient unsure what type of plasma. BP soft while en route 100/50s with positive orthostatics. In ED, patient's MAP 68-70. Fecal occult positive. Scr 0.9 BUN 42 hgb 9.2  (BL 11-12)PT 27.3 INR 2.5. she was loaded with protonix 80 mg IV and both hospitalist & GI were consulted for admission. Acute upper GI bleed: GI was consulted and she underwent emergent EGD. She had endoclips x2 placed in the distal antrum of the stomach. IR was also consulted and she underwent embolization of the gastrodueodenal artery. Her hemoglobin has stable ~8 since the procedure. She will be discharged on protonix and follow up with GI as an out patient. She will be handed a Rx to obtain a CBC on 11/14. Mechanical valve: Cardiology was consulted and they recommend patient be discharged on lovenox 1mg/kg. She also to restart her Coumadin at discharge. Acute on Chronic HFpEF: patient with sob and edema due to iv fluids. Patient received iv lasix and her symptoms resolved. She is to continue her oral lasix at discharge    HTN: she will be discharged only on CoReg due to her soft blood pressures. Will defer to her Cardiologist to restart the rest of her blood pressure medication regimen. Obesity:  complicates care, lifestyle modifications encouraged        Disposition: Home  Diet: ADULT DIET; Regular; Low Fat/Low Chol/High Fiber/2 gm Na; No red dye  Code Status: Full Code    Follow Ups:   Follow-up Information     Bret Bradford MD. Schedule an appointment as soon as possible for a visit   in 1 month(s). Specialty: Gastroenterology  Why: hospital f/u for GI bleed  Contact information:  KISHA Jones [phone] Peña MD. Schedule an appointment as soon as possible   for a visit in 1 week(s). Specialty: Cardiology  Why: Hospital f/u.  bioprosthetic valve  Contact information:  [address]  [address] [facility]  [phone]                       Time spent in patient discharge and coordination 34 minutes.         Follow up labs/diagnostics (ultimately defer to outpatient provider):  None    Plan was discussed with patient and daughter. All questions answered. Patient was stable at time of discharge. Instructions given to call a physician or return if any concerns. Current Discharge Medication List        START taking these medications    Details   pantoprazole (PROTONIX) 40 MG tablet Take 1 tablet by mouth 2 times daily (before meals)  Qty: 60 tablet, Refills: 0      enoxaparin (LOVENOX) 100 MG/ML Inject 0.9 mLs into the skin 2 times daily for 14 days  Qty: 28 mL, Refills: 0           CONTINUE these medications which have CHANGED    Details   carvedilol (COREG) 3.125 MG tablet Take 1 tablet by mouth 2 times daily (with meals)  Qty: 60 tablet, Refills: 3           CONTINUE these medications which have NOT CHANGED    Details   potassium chloride (KLOR-CON) 10 MEQ extended release tablet Take 1 tablet by mouth daily  Qty: 90 tablet, Refills: 3      furosemide (LASIX) 20 MG tablet Take 1 tablet by mouth daily TAKE ONE TABLET BY MOUTH ONE TIME DAILY  Qty: 90 tablet, Refills: 3      levothyroxine (SYNTHROID) 50 MCG tablet Take 1 tablet by mouth in the morning. TAKE ONE TABLET BY MOUTH ONE TIME DAILY BEFORE BREAKFAST.   Qty: 90 tablet, Refills: 3    Associated Diagnoses: Acquired hypothyroidism      aspirin 81 MG EC tablet Take 81 mg by mouth daily      Biotin 2.5 MG CAPS Take 2 tablets by mouth 2 times daily      cefadroxil (DURICEF) 500 MG capsule Take 500 mg by mouth 2 times daily      vitamin D (CHOLECALCIFEROL) 25 MCG (1000 UT) TABS tablet Take 1,000 Units by mouth daily      warfarin (COUMADIN) 5 MG tablet TAKE ONE-HALF TO ONE TABLET BY MOUTH ONE TIME DAILY AS DIRECTED           STOP taking these medications       losartan (COZAAR) 25 MG tablet Comments:   Reason for Stopping:         diclofenac sodium (VOLTAREN) 1 % GEL Comments:   Reason for Stopping:               Procedures done this admission:  Procedure(s):  EGD CONTROL HEMORRHAGE/clipping    Consults this admission:  IP CONSULT TO CARDIOLOGY    Echocardiogram results:  11/06/22    TRANSTHORACIC ECHOCARDIOGRAM (TTE) COMPLETE (CONTRAST/BUBBLE/3D PRN) 11/10/2022  5:26 PM (Final)    Interpretation Summary    Left Ventricle: Normal left ventricular systolic function with a visually estimated EF of 55 - 60%. Left ventricle size is normal. Normal wall thickness. There is abnormal septal wall motion/hypokinesis consistent with valvular replacement. LV regional wall motion is normal otherwise. Abnormal diastolic function. Aortic Valve: Bioprosthetic valve that is well-seated. Mildly thickened cusp. No regurgitation. No stenosis. AV mean gradient is 22 mmHg. AV peak gradient is 43 mmHg. AV AT is 88.00 ms. LVOT:AV VTI Index is 0.35. Mitral Valve: Bileaflet mechanical valve. Mildly thickened leaflet. Physiologically normal regurgitation. No stenosis noted. MV mean gradient is 9 mmHg. No change in gradient compared to prior study. Tricuspid Valve: Mild regurgitation. Normal RVSP estimated at 30mmHg. Left Atrium: Left atrium is severely dilated. LA Vol Index is  67 ml/m2. Right Atrium: Right atrium is severely dilated. Technical qualifiers: Color flow Doppler was performed and pulse wave and/or continuous wave Doppler was performed. RVSP=30mmHg    Signed by: Tree Delaney MD on 11/10/2022  5:26 PM      Diagnostic Imaging/Tests:   XR CHEST 1 VIEW    Result Date: 11/10/2022  Findings suggest congestive heart failure versus multifocal infection. IR EMBOLIZATION HEMORRHAGE    Result Date: 11/7/2022  1. Upper mesenteric arteriogram as above. The gastroduodenal artery was embolized as this feeds the area of concern and the endoscopic clip. Plan:  3 hours bedrest. Continue to monitor hemoglobin.        Labs: Results:       BMP, Mg, Phos Recent Labs     11/09/22  0557 11/10/22  0538 11/11/22  0543    141 140   K 4.1 3.6 3.3*   * 112* 103   CO2 26 28 31 ANIONGAP 4 1* 6   BUN 16 12 17   CREATININE 0.90 0.90 1.10*   LABGLOM >60 >60 52*   CALCIUM 8.5 8.6 9.0   GLUCOSE 87 94 100   MG  --   --  2.1      CBC Recent Labs     11/09/22  0557 11/09/22  1640 11/10/22  0538 11/10/22  1624 11/11/22  0543   WBC 5.9  --  5.1  --  6.1   RBC 2.59*  --  2.63*  --  2.85*   HGB 7.7*   < > 7.8* 8.4* 8.7*   HCT 23.7*   < > 24.4* 25.6* 26.0*   MCV 91.5  --  92.8  --  91.2   MCH 29.7  --  29.7  --  30.5   MCHC 32.5  --  32.0  --  33.5   RDW 15.0*  --  14.6  --  14.6   *  --  133*  --  160   MPV 11.0  --  11.3  --  11.6   NRBC 0.00  --  0.00  --  0.00   SEGS 65  --  63  --  71   LYMPHOPCT 23  --  24  --  18   EOSRELPCT 2  --  3  --  3   MONOPCT 8  --  10  --  8   BASOPCT 1  --  0  --  0   IMMGRAN 1  --  0  --  0   SEGSABS 3.9  --  3.2  --  4.3   LYMPHSABS 1.3  --  1.2  --  1.1   EOSABS 0.1  --  0.2  --  0.2   MONOSABS 0.5  --  0.5  --  0.5   BASOSABS 0.0  --  0.0  --  0.0   ABSIMMGRAN 0.1  --  0.0  --  0.0    < > = values in this interval not displayed. LFT No results for input(s): BILITOT, BILIDIR, ALKPHOS, AST, ALT, PROT, LABALBU, GLOB in the last 72 hours.    Cardiac  Lab Results   Component Value Date/Time    NTPROBNP 4,607 11/10/2022 09:29 AM      Coags Lab Results   Component Value Date/Time    PROTIME 14.4 11/11/2022 05:43 AM    PROTIME 15.8 11/07/2022 05:24 AM    PROTIME 16.7 11/07/2022 01:43 AM    INR 1.1 11/11/2022 05:43 AM    INR 1.2 11/07/2022 05:24 AM    INR 1.3 11/07/2022 01:43 AM    INR 3.1 05/20/2022 10:29 AM    INR 3.0 04/22/2022 10:09 AM    INR 3.2 03/29/2022 11:14 AM    APTT 29.1 11/07/2022 05:24 AM      A1c No results found for: LABA1C, EAG   Lipids Lab Results   Component Value Date/Time    CHOL 209 07/28/2022 01:59 PM    LDLCALC 135.2 07/28/2022 01:59 PM    LABVLDL 25.8 07/28/2022 01:59 PM    HDL 48 07/28/2022 01:59 PM    CHOLHDLRATIO 4.4 07/28/2022 01:59 PM    TRIG 129 07/28/2022 01:59 PM      Thyroid  Lab Results   Component Value Date/Time    QUV2PVY 1.590 07/28/2022 01:59 PM        Most Recent UA Lab Results   Component Value Date/Time    COLORU YELLOW/STRAW 07/28/2022 01:59 PM    APPEARANCE TURBID 07/28/2022 01:59 PM    SPECGRAV 1.022 07/28/2022 01:59 PM    LABPH 5.0 07/28/2022 01:59 PM    PROTEINU Negative 07/28/2022 01:59 PM    GLUCOSEU Negative 07/28/2022 01:59 PM    KETUA Negative 07/28/2022 01:59 PM    BILIRUBINUR Negative 07/28/2022 01:59 PM    BLOODU Negative 07/28/2022 01:59 PM    UROBILINOGEN 0.2 07/28/2022 01:59 PM    NITRU Negative 07/28/2022 01:59 PM    LEUKOCYTESUR Negative 07/28/2022 01:59 PM    WBCUA 0-4 07/28/2022 01:59 PM    RBCUA 0-5 07/28/2022 01:59 PM    EPITHUA 0-5 07/28/2022 01:59 PM    BACTERIA Negative 07/28/2022 01:59 PM    LABCAST 0-2 07/28/2022 01:59 PM    MUCUS 0 07/28/2022 01:59 PM        No results for input(s): CULTURE in the last 720 hours.     All Labs from Last 24 Hrs:  Recent Results (from the past 24 hour(s))   Transthoracic echocardiogram (TTE) complete with contrast, bubble, strain, and 3D PRN    Collection Time: 11/10/22  4:00 PM   Result Value Ref Range    LV EDV A2C 70 mL    LV EDV A4C 80 mL    LV ESV A2C 32 mL    LV ESV A4C 33 mL    IVSd 0.9 0.6 - 0.9 cm    LVIDd 5.0 3.9 - 5.3 cm    LVIDs 3.6 cm    LVOT Diameter 1.9 cm    LVOT Mean Gradient 2 mmHg    LVOT VTI 24.6 cm    LVOT Peak Velocity 1.1 m/s    LVOT Peak Gradient 5 mmHg    LVPWd 1.0 (A) 0.6 - 0.9 cm    LV E' Lateral Velocity 7 cm/s    LV Ejection Fraction A2C 55 %    LV Ejection Fraction A4C 59 %    EF BP 56 55 - 100 %    LVOT Area 2.8 cm2    LVOT SV 69.7 ml    LA Minor Axis 7.6 cm    LA Major Axis 8.4 cm    LA Area 2C 32.8 cm2    LA Area 4C 38.2 cm2    LA Volume 2C 114 (A) 22 - 52 mL    LA Volume 4C 143 (A) 22 - 52 mL    LA Volume  (A) 22 - 52 mL    LA Diameter 5.7 cm    AV AT 88.00 ms    AV Mean Velocity 2.2 m/s    AV Mean Gradient 22 mmHg    AV VTI 70.0 cm    AV Peak Velocity 3.3 m/s    AV Peak Gradient 43 mmHg    AV Area by VTI 1.0 cm2    AV Area by Peak - 100 mg/dL    BUN 17 8 - 23 MG/DL    Creatinine 1.10 (H) 0.6 - 1.0 MG/DL    Est, Glom Filt Rate 52 (L) >60 ml/min/1.73m2    Calcium 9.0 8.3 - 10.4 MG/DL   CBC with Auto Differential    Collection Time: 11/11/22  5:43 AM   Result Value Ref Range    WBC 6.1 4.3 - 11.1 K/uL    RBC 2.85 (L) 4.05 - 5.2 M/uL    Hemoglobin 8.7 (L) 11.7 - 15.4 g/dL    Hematocrit 26.0 (L) 35.8 - 46.3 %    MCV 91.2 82 - 102 FL    MCH 30.5 26.1 - 32.9 PG    MCHC 33.5 31.4 - 35.0 g/dL    RDW 14.6 11.9 - 14.6 %    Platelets 533 116 - 097 K/uL    MPV 11.6 9.4 - 12.3 FL    nRBC 0.00 0.0 - 0.2 K/uL    Differential Type AUTOMATED      Seg Neutrophils 71 43 - 78 %    Lymphocytes 18 13 - 44 %    Monocytes 8 4.0 - 12.0 %    Eosinophils % 3 0.5 - 7.8 %    Basophils 0 0.0 - 2.0 %    Immature Granulocytes 0 0.0 - 5.0 %    Segs Absolute 4.3 1.7 - 8.2 K/UL    Absolute Lymph # 1.1 0.5 - 4.6 K/UL    Absolute Mono # 0.5 0.1 - 1.3 K/UL    Absolute Eos # 0.2 0.0 - 0.8 K/UL    Basophils Absolute 0.0 0.0 - 0.2 K/UL    Absolute Immature Granulocyte 0.0 0.0 - 0.5 K/UL   Magnesium    Collection Time: 11/11/22  5:43 AM   Result Value Ref Range    Magnesium 2.1 1.8 - 2.4 mg/dL   Protime-INR    Collection Time: 11/11/22  5:43 AM   Result Value Ref Range    Protime 14.4 (H) 12.6 - 14.3 sec    INR 1.1         Allergies   Allergen Reactions    Penicillins Rash     Immunization History   Administered Date(s) Administered    COVID-19, PFIZER PURPLE top, DILUTE for use, (age 15 y+), 30mcg/0.3mL 01/19/2021, 02/09/2021    Influenza, FLUAD, (age 72 y+), Adjuvanted, 0.5mL 09/10/2020    Influenza, High Dose (Fluzone 65 yrs and older) 11/10/2015, 10/12/2018    Influenza, Triv, inactivated, subunit, adjuvanted, IM (Fluad 65 yrs and older) 10/12/2018, 10/11/2019    Pneumococcal Conjugate 13-valent ([id number]) 03/09/2017    Pneumococcal Polysaccharide ([id number]) 06/27/2014    Tdap (Boostrix, Adacel) 07/31/2015       Recent Vital Data:  Patient Vitals for the past 24 hrs:   Temp Pulse Resp BP SpO2   11/11/22 1125 98 °F (36.7 °C) 73 20 (!) 101/57 96 %   11/11/22 0742 97.9 °F (36.6 °C) 71 20 (!) 135/56 93 %   11/11/22 0500 98.2 °F (36.8 °C) 70 16 (!) 111/51 94 %   11/11/22 0049 97.8 °F (36.6 °C) 74 17 134/71 93 %   11/10/22 2046 97.9 °F (36.6 °C) 74 17 133/62 98 %   11/10/22 1642 98.2 °F (36.8 °C) 70 17 (!) 136/56 95 %       Oxygen Therapy  SpO2: 96 %  Pulse via Oximetry: 70 beats per minute  Pulse Oximeter Device Mode: Continuous  Pulse Oximeter Device Location: Right, Finger  O2 Device: None (Room air)  O2 Flow Rate (L/min): 4 L/min    Estimated body mass index is 32.2 kg/m² as calculated from the following:    Height as of this encounter: 5' 6\" (1.676 m). Weight as of this encounter: 199 lb 8.3 oz (90.5 kg). Intake/Output Summary (Last 24 hours) at 11/11/2022 1414  Last data filed at 11/11/2022 1319  Gross per 24 hour   Intake 240 ml   Output --   Net 240 ml         Physical Exam:    General:    Well nourished. No overt distress. obese  Head:  Normocephalic, atraumatic  Eyes:  Sclerae appear normal.  Pupils equally round. HENT:  Nares appear normal, no drainage. Moist mucous membranes  Neck:  No restricted ROM. Trachea midline  CV:   Irregular. Click of mechanical valve. Lungs:   CTAB. No wheezing, rhonchi, or rales. Respirations even, unlabored  Abdomen:   Soft, nontender, nondistended. Extremities: Warm and dry. No cyanosis or clubbing. No edema. Skin:     No rashes. Normal coloration  Neuro:  CN II-XII grossly intact. Psych:  Normal mood and affect. Signed:  Sola Glass MD    Part of this note may have been written by using a voice dictation software. The note has been proof read but may still contain some grammatical/other typographical errors.

## 2022-11-11 NOTE — PROGRESS NOTES
Pt is stable with bed in lowest position, wheels locked, and call light within reach. Hourly rounds completed. VSS. IV is patent and dressing is clean, dry, and intact. Pt had complaints of sinus headache throughout night tylenol worked momentarily. PT not sure about taking sudafed. Will pass on in report about other options. Report to be given to day shift nurse.

## 2022-11-11 NOTE — PROGRESS NOTES
[facility] CARDIOLOGY PROGRESS NOTE    11/11/2022 7:08 AM    Admit Date: 11/6/2022        Subjective:   Stable overnight without angina, CHF, or palpitations. Vitals stable and controlled. No other complaints overnight. Tolerating meds well. Objective:      Vitals:    11/10/22 1642 11/10/22 2046 11/11/22 0049 11/11/22 0500   BP: (!) 136/56 133/62 134/71 (!) 111/51   Pulse: 70 74 74 70   Resp: 17 17 17 16   Temp: 98.2 °F (36.8 °C) 97.9 °F (36.6 °C) 97.8 °F (36.6 °C) 98.2 °F (36.8 °C)   TempSrc: Axillary Oral Oral    SpO2: 95% 98% 93% 94%   Weight:       Height:           Physical Exam:  Neck- supple, no JVD  CV- regular rate and rhythm, crisp mech valve click  Lung- clear bilaterally  Abd- soft, nontender, nondistended  Ext- no edema  Skin- warm and dry    Data Review:   Recent Labs     11/10/22  0538 11/10/22  1624 11/11/22  0543     --  140   K 3.6  --  3.3*   MG  --   --  2.1   BUN 12  --  17   WBC 5.1  --  6.1   HGB 7.8* 8.4* 8.7*   HCT 24.4* 25.6* 26.0*   *  --  160   INR  --   --  1.1     Echo 11/10/2022:  Left Ventricle Normal left ventricular systolic function with a visually estimated EF of 55 - 60%. Left ventricle size is normal. Normal wall thickness. There is abnormal septal wall motion/hypokinesis consistent with valvular replacement. LV regional wall motion is normal otherwise. Abnormal diastolic function. Left Atrium Left atrium is severely dilated. LA Vol Index is  67 ml/m2. Right Ventricle Right ventricle size is normal. Normal wall thickness. Normal systolic function. Right Atrium Right atrium is severely dilated. Aortic Valve Bioprosthetic valve that is well-seated. Mildly thickened cusp. No regurgitation. No stenosis. AV mean gradient is 22 mmHg. AV peak gradient is 43 mmHg. AV AT is 88.00 ms. LVOT:AV VTI Index is 0.35. Mitral Valve Bileaflet mechanical valve. Mildly thickened leaflet. Physiologically normal regurgitation. No stenosis noted.  MV mean gradient is 9 mmHg.  No change in gradient compared to prior study. Tricuspid Valve Valve structure is normal. Mild regurgitation. No stenosis noted. Normal RVSP estimated at 30mmHg. Pulmonic Valve Valve structure is normal. No regurgitation. No stenosis noted. Aorta Normal sized annulus, sinus of Valsalva, aortic root and ascending aorta. IVC/Hepatic Veins IVC diameter is less than or equal to 21 mm and decreases greater than 50% during inspiration; therefore the estimated right atrial pressure is normal (~3 mmHg). IVC size is normal.   Pericardium No pericardial effusion. Assessment and Plan: Active Hospital Problems    Acute upper gastrointestinal hemorrhage    ABLA (acute blood loss anemia)  -Hemoglobin slowly rising today, post transfusion  -Closely monitor counts, per primary/GI. ,  Recheck CBC in the morning, try to keep hemoglobin greater than 8. S/P mitral valve replacement with mechanical valve   S/p aortic valve replacement with a bioprosthetic valve. Warfarin anticoagulation chronically, but persistently anemic male, difficult situation given mechanical mitral valve. On heparin, see above. -Reasonable at this time to continue heparin drip, closely monitor blood counts. - If blood counts abruptly drop may need to hold heparin drip. Recheck daily CBC  -Ideally continue heparin with therapeutic Xa levels while INR is less than 2.5. INR 1.1 today. Resume warfarin in the near future once blood counts stabilize the patient improved clinically. Permanent atrial fibrillation (HCC)  V paced at 70 bpm with underlying permanent atrial fibrillation  -Anticoagulation plans as above. History of aortic valve replacement with bioprosthetic valve    Chronic bacterial endocarditis prophylaxis.  -On chronic suppressive therapy and afebrile, stable. Essential hypertension  - Medications held due to hypotension. Likely due to hypovolemia/acute blood loss anemia.   Reinitiate at discharge as tolerated. Plan today:   Blood pressure stable, carvedilol resumed. Edema resolved and lungs clear after IV Lasix, due to IV fluids. Echo with normal LV function and no significant RV dysfunction. Stable valvular pathology, unchanged compared to April. Hemoglobin stable. Okay from my standpoint to start Lovenox 1 mg/kg subcutaneously twice daily, resume Coumadin as taking at home and discharged home. However, defer to primary physicians given other medical comorbidities. Will need close monitoring of INR in our office on Monday and continuation of Lovenox until INR greater than 2.5. She will need a CBC on Monday as well to ensure no worsening anemia. Defer discharge to hospitalist, but from my standpoint this is fine today.         Jacqueline Blankenship MD

## 2022-11-11 NOTE — PLAN OF CARE
Problem: Discharge Planning  Goal: Discharge to home or other facility with appropriate resources  Outcome: Adequate for Discharge  Flowsheets (Taken 11/11/2022 0715)  Discharge to home or other facility with appropriate resources:   Identify barriers to discharge with patient and caregiver   Arrange for needed discharge resources and transportation as appropriate   Identify discharge learning needs (meds, wound care, etc)     Problem: ABCDS Injury Assessment  Goal: Absence of physical injury  Outcome: Adequate for Discharge  Flowsheets (Taken 11/11/2022 0715)  Absence of Physical Injury: Implement safety measures based on patient assessment     Problem: Safety - Adult  Goal: Free from fall injury  Outcome: Adequate for Discharge  Flowsheets (Taken 11/11/2022 0715)  Free From Fall Injury: Instruct family/caregiver on patient safety

## 2022-11-13 ASSESSMENT — ENCOUNTER SYMPTOMS
SORE THROAT: 0
SHORTNESS OF BREATH: 0
DIARRHEA: 0
CONSTIPATION: 0
PHOTOPHOBIA: 0
ABDOMINAL PAIN: 0
ABDOMINAL DISTENTION: 0
COUGH: 0

## 2022-11-13 NOTE — PROGRESS NOTES
[facility] CARDIOLOGY  [address], [address] [facility], [address] [place] [address]  PHONE: [phone]        NAME:  Jonny Marsh  : 1945  MRN: [id number]     PCP:  Nikos Steele MD      SUBJECTIVE:   Jonny Marsh is a 69 y.o. female seen for a follow up visit regarding the following:     Chief Complaint   Patient presents with    Irregular Heart Beat    Congestive Heart Failure     HPI:    Recently admitted with GI bleeding, anticoagulation held and upper endoscopy performed. GI was consulted and she underwent emergent EGD. She had endoclips x2 placed in the distal antrum of the stomach. IR was also consulted and she underwent embolization of the gastrodueodenal artery. Her hemoglobin has stable ~8-9.7 since the procedure. She was discharged on protonix and will follow up with GI as an out patient. She is slowly improving and energy level/functionality standpoint denies any interval blood loss. She looks good and is no longer pale today. Recent hemoglobin was up to 9.7 a few days ago, rechecking today. No obvious blood loss since discharge despite being on Lovenox with an INR of 2.0 today. Target INR has been 2.5-3.5, her last dose of Lovenox will be this evening. She has been on chronic oral suppressive therapy for endocarditis with two valve replacements and tolerating it very well. She denies fever, chills, or malaise since our last visit. She has been vaccinated for Covid. She has mild LE dependent edema but it responds well to PRN BID lasix for a few days. She denies any CHF symptoms. [facility] is satisfied that there is no occult worsening of potential endocarditis/bacteremia and she is following up with them only PRN now. She is very happy with current results of chronic ABX suppressive therapy and doing well.        An echo in May 2014 suggested possible small vegetation of pacer lead, but surveillance repeat echo in June 2015 showed no vegetation present. She is back to baseline activities without any significant limitations at present. AF is permanent now but she is anticoagulated lifelong and clinically asymptomatic. She needs surveillance echo yearly for now with mildly elevated but no change in aortic and mitral prosthetic gradients on April 2021 and April 2022 echos. .. Echo 4/12/2022:     Left Ventricle  Left ventricle size is normal. Mildly increased wall thickness. Normal wall motion. Low normal left ventricular systolic function with a visually  estimated EF of 50 - 55%. Indeterminate diastolic function. Left Atrium  Left atrium is severely dilated. LA Vol Index is  51 ml/m2. Right Ventricle  Right ventricle size is normal. Normal wall thickness. RV basal diameter is 3.9 cm. RV mid diameter is 3.9 cm. Normal systolic function. Right Atrium  Right atrium size is normal.     Aortic Valve  Mildly calcified cusp. Mild stenosis of the aortic valve. AV mean gradient is 13 mmHg. AV peak gradient is 23 mmHg. LVOT:AV VTI Index is 0.34. LVOT  diameter is 2.0 cm. AV area by continuity VTI is 1.1 cm2. AV Stroke Volume index is 29.9 mL/m2. Mitral Valve  Not well visualized. No transvalvular regurgitation. MV mean gradient is 8 mmHg. MV PEAK-28  DI-2.33  MVA-1.32     Tricuspid Valve  Not well visualized. Mild transvalvular regurgitation. RVSP is 33 mmHg. TV mean velocity is 1.1 m/s. TV mean gradient is 5 mmHg. TV peak gradient  is 13 mmHg. History of TV Ring     Pulmonic Valve  The pulmonic valve was not well visualized. Aorta  Not well visualized. Pericardium  No pericardial effusion. .   Past Medical History, Past Surgical History, Family history, Social History, and Medications were all reviewed with the patient today and updated as necessary.      Current Outpatient Medications   Medication Sig Dispense Refill    carvedilol (COREG) 3.125 MG tablet Take 1 tablet by mouth 2 times daily (with meals) 60 tablet 3    pantoprazole (PROTONIX) 40 MG tablet Take 1 tablet by mouth 2 times daily (before meals) 60 tablet 0    potassium chloride (KLOR-CON) 10 MEQ extended release tablet Take 1 tablet by mouth daily 90 tablet 3    furosemide (LASIX) 20 MG tablet Take 1 tablet by mouth daily TAKE ONE TABLET BY MOUTH ONE TIME DAILY 90 tablet 3    levothyroxine (SYNTHROID) 50 MCG tablet Take 1 tablet by mouth in the morning. TAKE ONE TABLET BY MOUTH ONE TIME DAILY BEFORE BREAKFAST. 90 tablet 3    aspirin 81 MG EC tablet Take 81 mg by mouth daily      Biotin 2.5 MG CAPS Take 2 tablets by mouth 2 times daily      cefadroxil (DURICEF) 500 MG capsule Take 500 mg by mouth 2 times daily      vitamin D (CHOLECALCIFEROL) 25 MCG (1000 UT) TABS tablet Take 1,000 Units by mouth daily      warfarin (COUMADIN) 5 MG tablet TAKE ONE-HALF TO ONE TABLET BY MOUTH ONE TIME DAILY AS DIRECTED       No current facility-administered medications for this visit.             Allergies   Allergen Reactions    Penicillins Rash       Patient Active Problem List    Diagnosis Date Noted    Acute upper gastrointestinal hemorrhage 11/06/2022     Priority: Medium    ABLA (acute blood loss anemia) 11/06/2022     Priority: Medium    Warfarin anticoagulation 11/06/2022     Priority: Medium    Hypercoagulable state, secondary ([facility] [address] [address] 11/06/2022     Priority: Medium    Permanent atrial fibrillation ([facility] [address] 10/03/2019    Long term (current) use of anticoagulants 07/18/2018    History of mitral valve replacement with mechanical valve      Overview Note:     mechanical mitral valve  2001 at [facility]        Cardiac pacemaker 07/15/2016    History of aortic valve replacement with bioprosthetic valve 07/15/2016    History of prosthetic heart valve      Overview Note:     Bovine aortic valve  2009 at [facility]         Chronic bacterial endocarditis     Tricuspid valve disorder      Overview Note:     S/p annuloplasty ring by patient report        Chronic systolic heart failure ([address]      Overview Note:     Resolved by  Last echo, euvolemic        Chronic renal failure     Hypertension     Chronic a-fib ([facility] [address] 02/17/2016    Pacemaker     Hx of bacterial endocarditis     Dyslipidemia         Past Surgical History:   Procedure Laterality Date    AORTIC VALVE REPLACEMENT  2009    [facility]    IR EMBOLIZATION HEMORRHAGE  11/7/2022    IR EMBOLIZATION HEMORRHAGE 11/7/2022 [facility]    MITRAL VALVE REPLACEMENT  2001    [facility]    OTHER SURGICAL HISTORY      s/p annuloplasty ring by pt report    PACEMAKER      MI CARDIAC SURG PROCEDURE UNLIST      TONSILLECTOMY      UPPER GASTROINTESTINAL ENDOSCOPY N/A 11/6/2022    EGD CONTROL HEMORRHAGE/clipping performed by Amy Calix MD at [facility] ENDOSCOPY       Family History   Problem Relation Age of Onset    Cancer Mother         Cervical    Breast Cancer Neg Hx     Heart Failure Mother         Social History     Tobacco Use    Smoking status: Never    Smokeless tobacco: Never   Substance Use Topics    Alcohol use: No       ROS:    Review of Systems   Constitutional:  Positive for fatigue. Negative for appetite change, chills and diaphoresis. HENT:  Negative for congestion, mouth sores, nosebleeds, sore throat and tinnitus. Eyes:  Negative for photophobia and visual disturbance. Respiratory:  Negative for cough and shortness of breath. Cardiovascular:  Negative for chest pain, palpitations and leg swelling. Gastrointestinal:  Negative for abdominal distention, abdominal pain, constipation and diarrhea. Endocrine: Negative for cold intolerance, heat intolerance, polydipsia and polyuria. Genitourinary:  Negative for dysuria and hematuria. Musculoskeletal:  Negative for arthralgias, joint swelling and myalgias. Skin:  Negative for rash. Allergic/Immunologic: Negative for environmental allergies and food allergies.    Neurological:  Negative for dizziness, seizures, syncope and light-headedness. Hematological:  Negative for adenopathy. Does not bruise/bleed easily. Psychiatric/Behavioral:  Negative for agitation, behavioral problems, dysphoric mood and hallucinations. The patient is not nervous/anxious. PHYSICAL EXAM:     Vitals:    11/18/22 1159   BP: 130/70   Pulse: 73   Weight: 180 lb (81.6 kg)   Height: 5' 6\" (1.676 m)      Wt Readings from Last 3 Encounters:   11/18/22 180 lb (81.6 kg)   11/08/22 199 lb 8.3 oz (90.5 kg)   10/26/22 184 lb (83.5 kg)      BP Readings from Last 3 Encounters:   11/18/22 130/70   11/11/22 (!) 101/57   10/26/22 108/68        Physical Exam  Constitutional:       Appearance: Normal appearance. She is normal weight. HENT:      Head: Normocephalic and atraumatic. Nose: Nose normal.      Mouth/Throat:      Mouth: Mucous membranes are moist.      Pharynx: Oropharynx is clear. Eyes:      Extraocular Movements: Extraocular movements intact. Pupils: Pupils are equal, round, and reactive to light. Neck:      Vascular: No carotid bruit or JVD. Cardiovascular:      Rate and Rhythm: Normal rate and regular rhythm. Heart sounds: No murmur (2/6 TERI RUSB, crisp MVR mech click) heard. No friction rub. No gallop. Pulmonary:      Effort: Pulmonary effort is normal.      Breath sounds: Normal breath sounds. No wheezing or rhonchi. Abdominal:      General: Abdomen is flat. Bowel sounds are normal. There is no distension. Palpations: Abdomen is soft. Tenderness: There is no abdominal tenderness. Musculoskeletal:         General: No swelling. Normal range of motion. Cervical back: Normal range of motion and neck supple. No tenderness. Skin:     General: Skin is warm and dry. Neurological:      General: No focal deficit present. Mental Status: She is alert and oriented to person, place, and time. Mental status is at baseline.    Psychiatric:         Mood and Affect: Mood normal.         Behavior: Behavior normal. Medical problems and test results were reviewed with the patient today. Lab Results   Component Value Date    CHOL 209 (H) 07/28/2022     Lab Results   Component Value Date    TRIG 129 07/28/2022     Lab Results   Component Value Date    HDL 48 07/28/2022     Lab Results   Component Value Date    LDLCALC 135.2 (H) 07/28/2022     Lab Results   Component Value Date    LABVLDL 25.8 (H) 07/28/2022     Lab Results   Component Value Date    CHOLHDLRATIO 4.4 07/28/2022        Lab Results   Component Value Date/Time     11/11/2022 05:43 AM    K 3.3 11/11/2022 05:43 AM     11/11/2022 05:43 AM    CO2 31 11/11/2022 05:43 AM    BUN 17 11/11/2022 05:43 AM    CREATININE 1.10 11/11/2022 05:43 AM    GLUCOSE 100 11/11/2022 05:43 AM    CALCIUM 9.0 11/11/2022 05:43 AM         Recent Labs     11/14/22  1132 11/11/22  0543 11/10/22  1624 11/10/22  0538   WBC 5.4 6.1  --  5.1   HGB 9.8* 8.7* 8.4* 7.8*   HCT 31.7* 26.0* 25.6* 24.4*   MCV 96.4 91.2  --  92.8    160  --  133*       No results found for: LABA1C  No results found for: EAG     No results found for: BNP     Lab Results   Component Value Date    TSH 1.920 09/26/2019       Results for orders placed or performed in visit on 11/18/22   AMB POC PT/INR   Result Value Ref Range    Prothrombin time, POC      POC INR 2.0         ASSESSMENT and PLAN     Diagnoses and all orders for this visit:      Permanent atrial fibrillation (HCC) - rate controlled, lifelong coumadin as tolerated- no recent bleeding      Chronic systolic heart failure ([address] - resolved, EF 49% on echo - euvolemic and asymptomatic at present. Recheck echo in 6-12 months in our office to reassess valve gradients.        Chronic bacterial endocarditis - stable, controlled, lifelong suppressive therapy per ID recs      Mechanical mitral valve present - stable, coumadin and ABX lifelong      History of aortic valve replacement with bioprosthetic valve - stable, doing well      Essential hypertension with goal blood pressure less than 130/80 - excellent here and at home      Cardiac pacemaker - s/p generator change-  we converted to a [employer] generator so that her leads and generator are MRI compatible now. Dyslipidemia- stable, continue meds and lifestyle/diet/exercise. Recent upper GI bleed-resolved, slowly improving on a daily basis. No obvious blood loss despite INR 2.0 today. Hemoglobin 9.7 on the 14th, rechecking today and in 1 week. Last dose of Lovenox this evening. Continue warfarin per routine. Call immediately with any acute blood loss, worsening fatigue or dyspnea, pale complexion. Return in about 6 months (around 5/18/2023).          Selvin Donohue MD  11/18/2022  12:22 PM

## 2022-11-14 ENCOUNTER — ANTI-COAG VISIT (OUTPATIENT)
Dept: CARDIOLOGY CLINIC | Age: 77
End: 2022-11-14
Payer: MEDICARE

## 2022-11-14 ENCOUNTER — CARE COORDINATION (OUTPATIENT)
Dept: CARE COORDINATION | Facility: CLINIC | Age: 77
End: 2022-11-14

## 2022-11-14 DIAGNOSIS — Z95.3 HISTORY OF AORTIC VALVE REPLACEMENT WITH BIOPROSTHETIC VALVE: ICD-10-CM

## 2022-11-14 DIAGNOSIS — Z95.2 PRESENCE OF PROSTHETIC HEART VALVE: ICD-10-CM

## 2022-11-14 DIAGNOSIS — Z95.2 HISTORY OF MITRAL VALVE REPLACEMENT WITH MECHANICAL VALVE: Primary | ICD-10-CM

## 2022-11-14 DIAGNOSIS — I48.20 CHRONIC ATRIAL FIBRILLATION, UNSPECIFIED (HCC): ICD-10-CM

## 2022-11-14 DIAGNOSIS — Z79.01 LONG TERM (CURRENT) USE OF ANTICOAGULANTS: ICD-10-CM

## 2022-11-14 DIAGNOSIS — I48.21 PERMANENT ATRIAL FIBRILLATION (HCC): ICD-10-CM

## 2022-11-14 LAB
POC INR: 1.2
PROTHROMBIN TIME, POC: ABNORMAL

## 2022-11-14 PROCEDURE — 85610 PROTHROMBIN TIME: CPT | Performed by: INTERNAL MEDICINE

## 2022-11-14 NOTE — PATIENT INSTRUCTIONS
Reminder: Please contact the Coumadin Clinic at [phone] when you have medication changes. Examples, new medications, antibiotics, discontinued medications, new supplements, missed doses of warfarin or if you took extra doses of warfarin. This also includes OTC medications. Notifying us helps reduce the possibility of high and low INR's. In addition, if warfarin needs to be held for any procedures, please have surgeon or physician's office contact us before holding anticoagulant. Thanks, [facility] Cardiology Coumadin Clinic.

## 2022-11-14 NOTE — CARE COORDINATION
[facility] Care Transitions Initial Follow Up Call    Call within 2 business days of discharge: Yes    Care Transition Nurse contacted the patient by telephone to perform post hospital discharge assessment. Verified name and  with patient as identifiers. Provided introduction to self, and explanation of the Care Transition Nurse role. Patient: Fam Carias Patient : 1945   MRN: [id number]  Reason for Admission: upper gastrointestinal hemorrhage  Discharge Date: 22 RARS: Readmission Risk Score: 10.5      Last Discharge  [facility]       Date Complaint Diagnosis Description Type Department Provider    22 GI Bleeding UGIB (upper gastrointestinal bleed) . .. ED to Hosp-Admission (Discharged) (ADMITTED) S[facility]6MS George Bravo MD; Darcy Arias... Was this an external facility discharge? No Discharge Facility: [facility]    Challenges to be reviewed by the provider   Additional needs identified to be addressed with provider: No  none               Method of communication with provider: none. Follow Up  Future Appointments   Date Time Provider Rosemary Brown   2022  3:15 PM [facility] PT DG GVL AMB   2022 12:00 PM Luke Yip MD [facility] GVL AMB   2022 10:15 AM [facility] PT [facility] GVL AMB   2023 10:30 AM [facility] ECHO 21 DG GVL AMB   2023 10:15 AM Luke Yip MD [facility] GVL AMB   2023 10:00 AM Elyssa Mohan MD [facility]       Care Transition Nurse provided contact information. Plan for follow-up call in 1-2 days based on severity of symptoms and risk factors. Plan for next call:  Patient stated she was on the way to get some blood work and she would be happy to speak later in the week. CTN to attempt again tomorrow.     Sarai Crane RN

## 2022-11-14 NOTE — PROGRESS NOTES
Tablet strength and weekly dosing schedule confirmed today. Per Dr. Piter Bejarano, increase coumadin to 5 mg today and tomorrow. Recheck Friday.

## 2022-11-15 ENCOUNTER — CARE COORDINATION (OUTPATIENT)
Dept: CARE COORDINATION | Facility: CLINIC | Age: 77
End: 2022-11-15

## 2022-11-15 LAB
BASOPHILS # BLD: 0 K/UL (ref 0–0.2)
BASOPHILS NFR BLD: 1 % (ref 0–2)
DIFFERENTIAL METHOD BLD: ABNORMAL
EOSINOPHIL # BLD: 0.2 K/UL (ref 0–0.8)
EOSINOPHIL NFR BLD: 3 % (ref 0.5–7.8)
ERYTHROCYTE [DISTWIDTH] IN BLOOD BY AUTOMATED COUNT: 15 % (ref 11.9–14.6)
HCT VFR BLD AUTO: 31.7 % (ref 35.8–46.3)
HGB BLD-MCNC: 9.8 G/DL (ref 11.7–15.4)
IMM GRANULOCYTES # BLD AUTO: 0 K/UL (ref 0–0.5)
IMM GRANULOCYTES NFR BLD AUTO: 1 % (ref 0–5)
LYMPHOCYTES # BLD: 1.1 K/UL (ref 0.5–4.6)
LYMPHOCYTES NFR BLD: 19 % (ref 13–44)
MCH RBC QN AUTO: 29.8 PG (ref 26.1–32.9)
MCHC RBC AUTO-ENTMCNC: 30.9 G/DL (ref 31.4–35)
MCV RBC AUTO: 96.4 FL (ref 82–102)
MONOCYTES # BLD: 0.6 K/UL (ref 0.1–1.3)
MONOCYTES NFR BLD: 10 % (ref 4–12)
NEUTS SEG # BLD: 3.6 K/UL (ref 1.7–8.2)
NEUTS SEG NFR BLD: 66 % (ref 43–78)
NRBC # BLD: 0 K/UL (ref 0–0.2)
PLATELET # BLD AUTO: 195 K/UL (ref 150–450)
PMV BLD AUTO: 11.4 FL (ref 9.4–12.3)
RBC # BLD AUTO: 3.29 M/UL (ref 4.05–5.2)
WBC # BLD AUTO: 5.4 K/UL (ref 4.3–11.1)

## 2022-11-15 NOTE — CARE COORDINATION
[facility] Care Transitions Initial Follow Up Call    Call within 2 business days of discharge: Yes    Care Transition Nurse contacted the patient by telephone to perform post hospital discharge assessment. Verified name and  with patient as identifiers. Provided introduction to self, and explanation of the Care Transition Nurse role. Patient: Zakia Morales Patient : 1945   MRN: [id number]  Reason for Admission: upper gastrointestinal hemorrhage  Discharge Date: 22 RARS: Readmission Risk Score: 10.5      Last Discharge  Street       Date Complaint Diagnosis Description Type Department Provider    22 GI Bleeding UGIB (upper gastrointestinal bleed) . .. ED to Hosp-Admission (Discharged) (ADMITTED) SFD6MS Good Anglin MD; Ivonne Arias... Was this an external facility discharge? No Discharge Facility: [facility]    Challenges to be reviewed by the provider   Additional needs identified to be addressed with provider: No  none               Method of communication with provider: none. below    Care Transition Nurse reviewed discharge instructions, medical action plan, and red flags with patient who verbalized understanding. The patient was given an opportunity to ask questions and does not have any further questions or concerns at this time. Were discharge instructions available to patient? Yes. Reviewed appropriate site of care based on symptoms and resources available to patient including: PCP  Specialist  CTN . The patient agrees to contact the PCP office for questions related to their healthcare. Advance Care Planning:   Does patient have an Advance Directive: not on file. Medication reconciliation was performed with patient, who verbalizes understanding of administration of home medications.  Medications reviewed, 1111F entered: yes    Was patient discharged with a pulse oximeter? no    Non-face-to-face services provided:  Scheduled appointment with Specialist-11/18, cards    Offered patient enrollment in the Remote Patient Monitoring (RPM) program for in-home monitoring: NA.    Care Transitions 24 Hour Call    Do you have a copy of your discharge instructions?: Yes  Do you have all of your prescriptions and are they filled?: Yes  Have you been contacted by a [id number] [employer] Pharmacist?: No  Have you scheduled your follow up appointment?: Yes  How are you going to get to your appointment?: Car - family or friend to transport  Do you feel like you have everything you need to keep you well at home?: Yes  Are you an active caregiver in your home?: No  Care Transitions Interventions  Disease Specific Clinic: Completed      Disease Association: Completed               Follow Up  Future Appointments   Date Time Provider Rosemary Brown   11/18/2022 11:45 AM [facility] PT [facility] GVL AMB   11/18/2022 12:00 PM Amado Kate MD [facility] GVL AMB   11/23/2022 10:15 AM [facility] PT [facility] GVL AMB   4/18/2023 10:30 AM [facility] ECHO 21 [facility] GVL AMB   5/2/2023 10:15 AM Amado Kate MD [facility] GVL AMB   8/7/2023 10:00 AM Brittanie Theodore MD [facility]       Care Transition Nurse provided contact information. Plan for follow-up call in 5-7 days based on severity of symptoms and risk factors.   Plan for next call: symptom management-assess for new or worsening s/s, eval pt/inr  follow-up appointment-11/18, check on labs    Leticia Callejas RN

## 2022-11-18 ENCOUNTER — OFFICE VISIT (OUTPATIENT)
Dept: CARDIOLOGY CLINIC | Age: 77
End: 2022-11-18
Payer: MEDICARE

## 2022-11-18 ENCOUNTER — ANTI-COAG VISIT (OUTPATIENT)
Dept: CARDIOLOGY CLINIC | Age: 77
End: 2022-11-18
Payer: MEDICARE

## 2022-11-18 VITALS
HEIGHT: 66 IN | BODY MASS INDEX: 28.93 KG/M2 | SYSTOLIC BLOOD PRESSURE: 130 MMHG | DIASTOLIC BLOOD PRESSURE: 70 MMHG | WEIGHT: 180 LBS | HEART RATE: 73 BPM

## 2022-11-18 DIAGNOSIS — I50.22 CHRONIC SYSTOLIC HEART FAILURE (HCC): ICD-10-CM

## 2022-11-18 DIAGNOSIS — Z79.01 LONG TERM (CURRENT) USE OF ANTICOAGULANTS: ICD-10-CM

## 2022-11-18 DIAGNOSIS — I48.21 PERMANENT ATRIAL FIBRILLATION (HCC): ICD-10-CM

## 2022-11-18 DIAGNOSIS — E78.5 DYSLIPIDEMIA: ICD-10-CM

## 2022-11-18 DIAGNOSIS — Z95.0 CARDIAC PACEMAKER: ICD-10-CM

## 2022-11-18 DIAGNOSIS — I48.20 CHRONIC ATRIAL FIBRILLATION, UNSPECIFIED (HCC): ICD-10-CM

## 2022-11-18 DIAGNOSIS — Z95.3 HISTORY OF AORTIC VALVE REPLACEMENT WITH BIOPROSTHETIC VALVE: ICD-10-CM

## 2022-11-18 DIAGNOSIS — I48.21 PERMANENT ATRIAL FIBRILLATION (HCC): Primary | ICD-10-CM

## 2022-11-18 DIAGNOSIS — Z95.2 HISTORY OF MITRAL VALVE REPLACEMENT WITH MECHANICAL VALVE: Primary | ICD-10-CM

## 2022-11-18 DIAGNOSIS — Z95.2 PRESENCE OF PROSTHETIC HEART VALVE: ICD-10-CM

## 2022-11-18 LAB
ERYTHROCYTE [DISTWIDTH] IN BLOOD BY AUTOMATED COUNT: 14.8 % (ref 11.9–14.6)
HCT VFR BLD AUTO: 33.1 % (ref 35.8–46.3)
HGB BLD-MCNC: 10.4 G/DL (ref 11.7–15.4)
MCH RBC QN AUTO: 30.1 PG (ref 26.1–32.9)
MCHC RBC AUTO-ENTMCNC: 31.4 G/DL (ref 31.4–35)
MCV RBC AUTO: 95.9 FL (ref 82–102)
NRBC # BLD: 0 K/UL (ref 0–0.2)
PLATELET # BLD AUTO: 261 K/UL (ref 150–450)
PMV BLD AUTO: 11.2 FL (ref 9.4–12.3)
POC INR: 2
PROTHROMBIN TIME, POC: ABNORMAL
RBC # BLD AUTO: 3.45 M/UL (ref 4.05–5.2)
WBC # BLD AUTO: 5.3 K/UL (ref 4.3–11.1)

## 2022-11-18 PROCEDURE — 99214 OFFICE O/P EST MOD 30 MIN: CPT | Performed by: INTERNAL MEDICINE

## 2022-11-18 PROCEDURE — 1123F ACP DISCUSS/DSCN MKR DOCD: CPT | Performed by: INTERNAL MEDICINE

## 2022-11-18 PROCEDURE — G8484 FLU IMMUNIZE NO ADMIN: HCPCS | Performed by: INTERNAL MEDICINE

## 2022-11-18 PROCEDURE — G8400 PT W/DXA NO RESULTS DOC: HCPCS | Performed by: INTERNAL MEDICINE

## 2022-11-18 PROCEDURE — 3078F DIAST BP <80 MM HG: CPT | Performed by: INTERNAL MEDICINE

## 2022-11-18 PROCEDURE — 1111F DSCHRG MED/CURRENT MED MERGE: CPT | Performed by: INTERNAL MEDICINE

## 2022-11-18 PROCEDURE — G8427 DOCREV CUR MEDS BY ELIG CLIN: HCPCS | Performed by: INTERNAL MEDICINE

## 2022-11-18 PROCEDURE — 93000 ELECTROCARDIOGRAM COMPLETE: CPT | Performed by: INTERNAL MEDICINE

## 2022-11-18 PROCEDURE — 3074F SYST BP LT 130 MM HG: CPT | Performed by: INTERNAL MEDICINE

## 2022-11-18 PROCEDURE — 1090F PRES/ABSN URINE INCON ASSESS: CPT | Performed by: INTERNAL MEDICINE

## 2022-11-18 PROCEDURE — G8417 CALC BMI ABV UP PARAM F/U: HCPCS | Performed by: INTERNAL MEDICINE

## 2022-11-18 PROCEDURE — 93793 ANTICOAG MGMT PT WARFARIN: CPT | Performed by: INTERNAL MEDICINE

## 2022-11-18 PROCEDURE — 85610 PROTHROMBIN TIME: CPT | Performed by: INTERNAL MEDICINE

## 2022-11-18 PROCEDURE — 1036F TOBACCO NON-USER: CPT | Performed by: INTERNAL MEDICINE

## 2022-11-18 NOTE — PATIENT INSTRUCTIONS
Reminder: Please contact the Coumadin Clinic at [phone] when you have medication changes. Examples, new medications, antibiotics, discontinued medications, new supplements, missed doses of warfarin or if you took extra doses of warfarin. This also includes OTC medications. Notifying us helps reduce the possibility of high and low INR's. In addition, if warfarin needs to be held for any procedures, please have surgeon or physician's office contact us before holding anticoagulant. Thanks, [facility] Cardiology Coumadin Clinic.

## 2022-11-22 ENCOUNTER — CARE COORDINATION (OUTPATIENT)
Dept: CARE COORDINATION | Facility: CLINIC | Age: 77
End: 2022-11-22

## 2022-11-22 NOTE — CARE COORDINATION
[facility] Care Transitions Follow Up Call    Care Transition Nurse contacted the patient by telephone to follow up after admission on GI bleeding. Verified name and  with patient as identifiers. Patient: Teresa Alcaraz  Patient : 1945   MRN: [id number]  Reason for Admission: GI bleed  Discharge Date: 22 RARS: Readmission Risk Score: 10.5      Needs to be reviewed by the provider   Additional needs identified to be addressed with provider: No  none             Method of communication with provider: none. below    Addressed changes since last contact:  labs-patient with coumadin clinic visit  Discussed follow-up appointments. If no appointment was previously scheduled, appointment scheduling offered: Yes. Is follow up appointment scheduled within 7 days of discharge? Yes. Follow Up  Future Appointments   Date Time Provider Rosemary Brown   2022 10:15 AM [facility] PT [facility] GVL AMB   2023 10:30 AM [facility] ECHO 21 [facility] GVL AMB   2023 10:15 AM Rylie Espinoza MD [facility] GVL AMB   2023 10:00 AM Jenni Johnson MD [facility] GVL AMB     Non[facility] follow up appointment(s): na    Care Transition Nurse reviewed discharge instructions, medical action plan, and red flags with patient and discussed any barriers to care and/or understanding of plan of care after discharge. Discussed appropriate site of care based on symptoms and resources available to patient including: PCP  Specialist  CTN . The patient agrees to contact the PCP office for questions related to their healthcare. Advance Care Planning:   not on file.      Patients top risk factors for readmission: medical condition-bacterial endocarditis, GI hemorrhage, sHF, PAF  Interventions to address risk factors: Scheduled appointment with Specialist-coumadin clinic  and Obtained and reviewed discharge summary and/or continuity of care documents    Offered patient enrollment in the Remote Patient Monitoring (RPM) program for in-home monitoring: NA.     Care Transitions Subsequent and Final Call    Subsequent and Final Calls  Do you have any ongoing symptoms?: No  Have your medications changed?: Yes  Patient Reports: lovenox d/c after appt. Recheck labs 11/23  Do you have any questions related to your medications?: No  Do you currently have any active services?: No  Do you have any needs or concerns that I can assist you with?: No  Identified Barriers: None  Care Transitions Interventions  Disease Specific Clinic: Completed      Disease Association: Completed      Other Interventions:             Care Transition Nurse provided contact information for future needs. Plan for follow-up call in 10-14 days based on severity of symptoms and risk factors.   Plan for next call:  assess for new or worsening s/s and warfarin regimen    Tran Salas RN

## 2022-11-22 NOTE — PROGRESS NOTES
Per Dr. Kumar Cedeno, Hgb higher. Despite lovenox and coumadin. ..good! Keep up the good work.    Rudi

## 2022-11-23 ENCOUNTER — ANTI-COAG VISIT (OUTPATIENT)
Dept: CARDIOLOGY CLINIC | Age: 77
End: 2022-11-23
Payer: MEDICARE

## 2022-11-23 DIAGNOSIS — Z79.01 LONG TERM (CURRENT) USE OF ANTICOAGULANTS: ICD-10-CM

## 2022-11-23 DIAGNOSIS — Z95.3 HISTORY OF AORTIC VALVE REPLACEMENT WITH BIOPROSTHETIC VALVE: ICD-10-CM

## 2022-11-23 DIAGNOSIS — Z95.2 HISTORY OF MITRAL VALVE REPLACEMENT WITH MECHANICAL VALVE: Primary | ICD-10-CM

## 2022-11-23 DIAGNOSIS — I48.20 CHRONIC ATRIAL FIBRILLATION, UNSPECIFIED (HCC): ICD-10-CM

## 2022-11-23 DIAGNOSIS — Z95.2 PRESENCE OF PROSTHETIC HEART VALVE: ICD-10-CM

## 2022-11-23 DIAGNOSIS — I48.21 PERMANENT ATRIAL FIBRILLATION (HCC): ICD-10-CM

## 2022-11-23 LAB
POC INR: 2.4
PROTHROMBIN TIME, POC: ABNORMAL

## 2022-11-23 PROCEDURE — 93793 ANTICOAG MGMT PT WARFARIN: CPT | Performed by: INTERNAL MEDICINE

## 2022-11-23 PROCEDURE — 85610 PROTHROMBIN TIME: CPT | Performed by: INTERNAL MEDICINE

## 2022-11-23 NOTE — PATIENT INSTRUCTIONS
Reminder: Please contact the Coumadin Clinic at [phone] when you have medication changes. Examples, new medications, antibiotics, discontinued medications, new supplements, missed doses of warfarin or if you took extra doses of warfarin. This also includes OTC medications. Notifying us helps reduce the possibility of high and low INR's. In addition, if warfarin needs to be held for any procedures, please have surgeon or physician's office contact us before holding anticoagulant. Thanks, [facility] Cardiology Coumadin Clinic.

## 2022-11-25 DIAGNOSIS — Z79.01 LONG TERM (CURRENT) USE OF ANTICOAGULANTS: ICD-10-CM

## 2022-11-25 LAB
ERYTHROCYTE [DISTWIDTH] IN BLOOD BY AUTOMATED COUNT: 14.6 % (ref 11.9–14.6)
HCT VFR BLD AUTO: 32.4 % (ref 35.8–46.3)
HGB BLD-MCNC: 10 G/DL (ref 11.7–15.4)
MCH RBC QN AUTO: 29.7 PG (ref 26.1–32.9)
MCHC RBC AUTO-ENTMCNC: 30.9 G/DL (ref 31.4–35)
MCV RBC AUTO: 96.1 FL (ref 82–102)
NRBC # BLD: 0 K/UL (ref 0–0.2)
PLATELET # BLD AUTO: 234 K/UL (ref 150–450)
PMV BLD AUTO: 10.6 FL (ref 9.4–12.3)
RBC # BLD AUTO: 3.37 M/UL (ref 4.05–5.2)
WBC # BLD AUTO: 4.4 K/UL (ref 4.3–11.1)

## 2022-11-30 ENCOUNTER — ANTI-COAG VISIT (OUTPATIENT)
Dept: CARDIOLOGY CLINIC | Age: 77
End: 2022-11-30
Payer: MEDICARE

## 2022-11-30 DIAGNOSIS — I48.21 PERMANENT ATRIAL FIBRILLATION (HCC): ICD-10-CM

## 2022-11-30 DIAGNOSIS — Z95.3 HISTORY OF AORTIC VALVE REPLACEMENT WITH BIOPROSTHETIC VALVE: ICD-10-CM

## 2022-11-30 DIAGNOSIS — I48.20 CHRONIC ATRIAL FIBRILLATION, UNSPECIFIED (HCC): ICD-10-CM

## 2022-11-30 DIAGNOSIS — Z95.2 PRESENCE OF PROSTHETIC HEART VALVE: ICD-10-CM

## 2022-11-30 DIAGNOSIS — Z79.01 LONG TERM (CURRENT) USE OF ANTICOAGULANTS: ICD-10-CM

## 2022-11-30 DIAGNOSIS — Z95.2 HISTORY OF MITRAL VALVE REPLACEMENT WITH MECHANICAL VALVE: Primary | ICD-10-CM

## 2022-11-30 LAB
POC INR: 3.3
PROTHROMBIN TIME, POC: NORMAL

## 2022-11-30 PROCEDURE — 85610 PROTHROMBIN TIME: CPT | Performed by: INTERNAL MEDICINE

## 2022-11-30 PROCEDURE — 93793 ANTICOAG MGMT PT WARFARIN: CPT | Performed by: INTERNAL MEDICINE

## 2022-12-06 ENCOUNTER — CARE COORDINATION (OUTPATIENT)
Dept: CARE COORDINATION | Facility: CLINIC | Age: 77
End: 2022-12-06

## 2022-12-06 NOTE — CARE COORDINATION
Patient has graduated from the Care Transitions program on 12/6. Patient/family has the ability to self-manage at this time. Patient has no further care management needs, no referral to the [facility] team for further management. Patient with recent compliance with follow up appts. Patient with no current concerns at this time. Patient started back driving and endorses an improvement in symptoms. Patient has Care Transition Nurse's contact information for any further questions, concerns, or needs.   Patients upcoming visits:    Future Appointments   Date Time Provider Rosemary Brown   12/14/2022 10:30 AM [facility] PT [facility] AM[facility]   4/18/2023 10:30 AM [facility] ECHO 21 [facility] AM[facility]   5/2/2023 10:15 AM Shanda Dunlap MD [facility] AM[facility]   8/7/2023 10:00 AM Elia Bentley MD [facility] AMB

## 2022-12-08 DIAGNOSIS — I48.21 PERMANENT ATRIAL FIBRILLATION (HCC): ICD-10-CM

## 2022-12-14 ENCOUNTER — ANTI-COAG VISIT (OUTPATIENT)
Dept: CARDIOLOGY CLINIC | Age: 77
End: 2022-12-14
Payer: MEDICARE

## 2022-12-14 DIAGNOSIS — Z79.01 LONG TERM (CURRENT) USE OF ANTICOAGULANTS: ICD-10-CM

## 2022-12-14 DIAGNOSIS — Z95.3 HISTORY OF AORTIC VALVE REPLACEMENT WITH BIOPROSTHETIC VALVE: ICD-10-CM

## 2022-12-14 DIAGNOSIS — Z95.2 HISTORY OF MITRAL VALVE REPLACEMENT WITH MECHANICAL VALVE: Primary | ICD-10-CM

## 2022-12-14 DIAGNOSIS — Z95.2 PRESENCE OF PROSTHETIC HEART VALVE: ICD-10-CM

## 2022-12-14 DIAGNOSIS — I48.21 PERMANENT ATRIAL FIBRILLATION (HCC): ICD-10-CM

## 2022-12-14 DIAGNOSIS — I48.20 CHRONIC ATRIAL FIBRILLATION, UNSPECIFIED (HCC): ICD-10-CM

## 2022-12-14 LAB
POC INR: 2.4
PROTHROMBIN TIME, POC: NORMAL

## 2022-12-14 PROCEDURE — 93793 ANTICOAG MGMT PT WARFARIN: CPT | Performed by: INTERNAL MEDICINE

## 2022-12-14 PROCEDURE — 85610 PROTHROMBIN TIME: CPT | Performed by: INTERNAL MEDICINE

## 2022-12-14 NOTE — PATIENT INSTRUCTIONS
Reminder: Please contact the Coumadin Clinic at [phone] when you have medication changes. Examples, new medications, antibiotics, discontinued medications, new supplements, missed doses of warfarin or if you took extra doses of warfarin. This also includes OTC medications. Notifying us helps reduce the possibility of high and low INR's. In addition, if warfarin needs to be held for any procedures, please have surgeon or physician's office contact us before holding anticoagulant. Thanks, [address] [facility] Coumadin [facility].

## 2023-01-03 ENCOUNTER — TELEPHONE (OUTPATIENT)
Dept: CARDIOLOGY CLINIC | Age: 78
End: 2023-01-03

## 2023-01-03 DIAGNOSIS — I48.20 CHRONIC A-FIB (HCC): ICD-10-CM

## 2023-01-03 DIAGNOSIS — I48.20 CHRONIC A-FIB (HCC): Primary | ICD-10-CM

## 2023-01-03 DIAGNOSIS — I48.21 PERMANENT ATRIAL FIBRILLATION (HCC): ICD-10-CM

## 2023-01-03 DIAGNOSIS — I10 HYPERTENSION: ICD-10-CM

## 2023-01-03 LAB
ERYTHROCYTE [DISTWIDTH] IN BLOOD BY AUTOMATED COUNT: 13.5 % (ref 11.9–14.6)
HCT VFR BLD AUTO: 37.3 % (ref 35.8–46.3)
HGB BLD-MCNC: 12 G/DL (ref 11.7–15.4)
MCH RBC QN AUTO: 29.8 PG (ref 26.1–32.9)
MCHC RBC AUTO-ENTMCNC: 32.2 G/DL (ref 31.4–35)
MCV RBC AUTO: 92.6 FL (ref 82–102)
NRBC # BLD: 0 K/UL (ref 0–0.2)
PLATELET # BLD AUTO: 236 K/UL (ref 150–450)
PMV BLD AUTO: 10.8 FL (ref 9.4–12.3)
RBC # BLD AUTO: 4.03 M/UL (ref 4.05–5.2)
WBC # BLD AUTO: 5.5 K/UL (ref 4.3–11.1)

## 2023-01-03 NOTE — TELEPHONE ENCOUNTER
I called and informed pt. of MD response and she v/u. I placed lab orders as below    Orders Placed This Encounter   Procedures    CBC     Standing Status:   Future     Number of Occurrences:   1     Standing Expiration Date:   7/3/3361    Basic Metabolic Panel     Standing Status:   Future     Number of Occurrences:   1     Standing Expiration Date:   1/3/2024

## 2023-01-03 NOTE — TELEPHONE ENCOUNTER
----- Message from Salty Borjas Tavia Daniel sent at 1/3/2023  1:27 PM EST -----  Regarding: FW: Red Flags- Possible Gastric Issues Again    ----- Message -----  From: Destiny Gooden  Sent: 1/3/2023   1:24 PM EST  To: Sharif Nieves MA  Subject: FW: Red Flags- Possible Gastric Issues Again       ----- Message -----  From: Sharif Nieves MA  Sent: 1/3/2023   9:11 AM EST  To: , #  Subject: FW: Red Flags- Possible Gastric Issues Again       ----- Message -----  From: Emil Beverly MA  Sent: 1/3/2023   9:07 AM EST  To: Sharif Nieves MA  Subject: FW: Red Flags- Possible Gastric Issues Again       ----- Message -----  From: Jorden Ferro  Sent: 1/1/2023   6:21 PM EST  To: [facility] Clinical Staff  Subject: Red Flags- Possible Gastric Issues Again         3:00 pm Noted RED FLAGS    1. Excessive belching    2. /64 @ 3:10 (had walked some but was sitting for a few minutes before taking)        /71 @ 3:36 (sitting)        BP 94/55 @ 6:15 (sitting)  3. O2 97 @ 3:10       O2  94 @ 6:15  4. Slightly light headed  5. Tiring very quickly     These were some signs of possible bleed from November. Should she have blood work to check to make sure she doesnt have a bleed?     [facility]

## 2023-01-04 LAB
ANION GAP SERPL CALC-SCNC: 3 MMOL/L (ref 2–11)
BUN SERPL-MCNC: 24 MG/DL (ref 8–23)
CALCIUM SERPL-MCNC: 8.9 MG/DL (ref 8.3–10.4)
CHLORIDE SERPL-SCNC: 106 MMOL/L (ref 101–110)
CO2 SERPL-SCNC: 29 MMOL/L (ref 21–32)
CREAT SERPL-MCNC: 1.1 MG/DL (ref 0.6–1)
GLUCOSE SERPL-MCNC: 98 MG/DL (ref 65–100)
POTASSIUM SERPL-SCNC: 4 MMOL/L (ref 3.5–5.1)
SODIUM SERPL-SCNC: 138 MMOL/L (ref 133–143)

## 2023-01-11 ENCOUNTER — ANTI-COAG VISIT (OUTPATIENT)
Dept: CARDIOLOGY CLINIC | Age: 78
End: 2023-01-11
Payer: MEDICARE

## 2023-01-11 DIAGNOSIS — Z95.2 PRESENCE OF PROSTHETIC HEART VALVE: ICD-10-CM

## 2023-01-11 DIAGNOSIS — Z79.01 LONG TERM (CURRENT) USE OF ANTICOAGULANTS: ICD-10-CM

## 2023-01-11 DIAGNOSIS — I48.20 CHRONIC ATRIAL FIBRILLATION, UNSPECIFIED (HCC): ICD-10-CM

## 2023-01-11 DIAGNOSIS — Z95.3 HISTORY OF AORTIC VALVE REPLACEMENT WITH BIOPROSTHETIC VALVE: ICD-10-CM

## 2023-01-11 DIAGNOSIS — Z95.2 HISTORY OF MITRAL VALVE REPLACEMENT WITH MECHANICAL VALVE: Primary | ICD-10-CM

## 2023-01-11 DIAGNOSIS — I48.21 PERMANENT ATRIAL FIBRILLATION (HCC): ICD-10-CM

## 2023-01-11 LAB
POC INR: 3
PROTHROMBIN TIME, POC: NORMAL

## 2023-01-11 PROCEDURE — 85610 PROTHROMBIN TIME: CPT | Performed by: INTERNAL MEDICINE

## 2023-01-11 PROCEDURE — 93793 ANTICOAG MGMT PT WARFARIN: CPT | Performed by: INTERNAL MEDICINE

## 2023-01-11 NOTE — PATIENT INSTRUCTIONS
Reminder: Please contact the Coumadin Clinic at [phone] when you have medication changes. Examples, new medications, antibiotics, discontinued medications, new supplements, missed doses of warfarin or if you took extra doses of warfarin. This also includes OTC medications. Notifying us helps reduce the possibility of high and low INR's. In addition, if warfarin needs to be held for any procedures, please have surgeon or physician's office contact us before holding anticoagulant. Thanks, [facility] Coumadin [facility].

## 2023-02-03 NOTE — TELEPHONE ENCOUNTER
MEDICATION REFILL REQUEST      Name of Medication:  Carvedilol  Dose:  3.125 mg  Frequency:  BID  Quantity:  180  Days' supply:  90      Pharmacy Name/Location:  [id number]

## 2023-02-06 RX ORDER — CARVEDILOL 3.12 MG/1
3.12 TABLET ORAL 2 TIMES DAILY WITH MEALS
Qty: 180 TABLET | Refills: 3 | Status: SHIPPED | OUTPATIENT
Start: 2023-02-06

## 2023-02-08 ENCOUNTER — ANTI-COAG VISIT (OUTPATIENT)
Dept: CARDIOLOGY CLINIC | Age: 78
End: 2023-02-08
Payer: MEDICARE

## 2023-02-08 DIAGNOSIS — Z95.2 HISTORY OF MITRAL VALVE REPLACEMENT WITH MECHANICAL VALVE: Primary | ICD-10-CM

## 2023-02-08 DIAGNOSIS — Z95.3 HISTORY OF AORTIC VALVE REPLACEMENT WITH BIOPROSTHETIC VALVE: ICD-10-CM

## 2023-02-08 DIAGNOSIS — Z95.2 PRESENCE OF PROSTHETIC HEART VALVE: ICD-10-CM

## 2023-02-08 DIAGNOSIS — I48.21 PERMANENT ATRIAL FIBRILLATION (HCC): ICD-10-CM

## 2023-02-08 DIAGNOSIS — Z79.01 LONG TERM (CURRENT) USE OF ANTICOAGULANTS: ICD-10-CM

## 2023-02-08 DIAGNOSIS — I48.20 CHRONIC ATRIAL FIBRILLATION, UNSPECIFIED (HCC): ICD-10-CM

## 2023-02-08 LAB
POC INR: 2.1
PROTHROMBIN TIME, POC: ABNORMAL

## 2023-02-08 PROCEDURE — 85610 PROTHROMBIN TIME: CPT | Performed by: INTERNAL MEDICINE

## 2023-02-08 PROCEDURE — 93793 ANTICOAG MGMT PT WARFARIN: CPT | Performed by: INTERNAL MEDICINE

## 2023-02-08 NOTE — PATIENT INSTRUCTIONS
Reminder: Please contact the Coumadin Clinic at [phone] when you have medication changes. Examples, new medications, antibiotics, discontinued medications, new supplements, missed doses of warfarin or if you took extra doses of warfarin. This also includes OTC medications. Notifying us helps reduce the possibility of high and low INR's. In addition, if warfarin needs to be held for any procedures, please have surgeon or physician's office contact us before holding anticoagulant. Thanks, [facility].

## 2023-02-08 NOTE — PROGRESS NOTES
Tablet strength and weekly dosing schedule confirmed today. Eyedrops added to regimen. One time increase to 5 mg today; recheck in two weeks.

## 2023-02-22 ENCOUNTER — ANTI-COAG VISIT (OUTPATIENT)
Dept: CARDIOLOGY CLINIC | Age: 78
End: 2023-02-22
Payer: MEDICARE

## 2023-02-22 DIAGNOSIS — I48.20 CHRONIC ATRIAL FIBRILLATION, UNSPECIFIED (HCC): ICD-10-CM

## 2023-02-22 DIAGNOSIS — I48.21 PERMANENT ATRIAL FIBRILLATION (HCC): ICD-10-CM

## 2023-02-22 DIAGNOSIS — Z79.01 LONG TERM (CURRENT) USE OF ANTICOAGULANTS: ICD-10-CM

## 2023-02-22 DIAGNOSIS — Z95.2 HISTORY OF MITRAL VALVE REPLACEMENT WITH MECHANICAL VALVE: Primary | ICD-10-CM

## 2023-02-22 DIAGNOSIS — Z95.3 HISTORY OF AORTIC VALVE REPLACEMENT WITH BIOPROSTHETIC VALVE: ICD-10-CM

## 2023-02-22 DIAGNOSIS — Z95.2 PRESENCE OF PROSTHETIC HEART VALVE: ICD-10-CM

## 2023-02-22 LAB
POC INR: 2.5
PROTHROMBIN TIME, POC: NORMAL

## 2023-02-22 PROCEDURE — 93793 ANTICOAG MGMT PT WARFARIN: CPT | Performed by: INTERNAL MEDICINE

## 2023-02-22 PROCEDURE — 85610 PROTHROMBIN TIME: CPT | Performed by: INTERNAL MEDICINE

## 2023-02-22 NOTE — PATIENT INSTRUCTIONS
Reminder: Please contact the Coumadin Clinic at [phone] when you have medication changes. Examples, new medications, antibiotics, discontinued medications, new supplements, missed doses of warfarin or if you took extra doses of warfarin. This also includes OTC medications. Notifying us helps reduce the possibility of high and low INR's. In addition, if warfarin needs to be held for any procedures, please have surgeon or physician's office contact us before holding anticoagulant. Thanks, [facility] Coumadin Clinic.

## 2023-03-08 ENCOUNTER — HOSPITAL ENCOUNTER (OUTPATIENT)
Dept: MAMMOGRAPHY | Age: 78
Discharge: HOME | End: 2023-03-11
Payer: MEDICARE

## 2023-03-08 DIAGNOSIS — Z12.31 OTHER SCREENING MAMMOGRAM: ICD-10-CM

## 2023-03-08 PROCEDURE — 77063 BREAST TOMOSYNTHESIS BI: CPT

## 2023-03-13 DIAGNOSIS — I48.21 PERMANENT ATRIAL FIBRILLATION (HCC): ICD-10-CM

## 2023-03-22 ENCOUNTER — ANTI-COAG VISIT (OUTPATIENT)
Dept: CARDIOLOGY CLINIC | Age: 78
End: 2023-03-22
Payer: MEDICARE

## 2023-03-22 DIAGNOSIS — Z95.3 HISTORY OF AORTIC VALVE REPLACEMENT WITH BIOPROSTHETIC VALVE: ICD-10-CM

## 2023-03-22 DIAGNOSIS — Z95.2 PRESENCE OF PROSTHETIC HEART VALVE: ICD-10-CM

## 2023-03-22 DIAGNOSIS — Z95.2 HISTORY OF MITRAL VALVE REPLACEMENT WITH MECHANICAL VALVE: Primary | ICD-10-CM

## 2023-03-22 DIAGNOSIS — I48.21 PERMANENT ATRIAL FIBRILLATION (HCC): ICD-10-CM

## 2023-03-22 DIAGNOSIS — Z79.01 LONG TERM (CURRENT) USE OF ANTICOAGULANTS: ICD-10-CM

## 2023-03-22 DIAGNOSIS — I48.20 CHRONIC ATRIAL FIBRILLATION, UNSPECIFIED (HCC): ICD-10-CM

## 2023-03-22 LAB
POC INR: 3.3
PROTHROMBIN TIME, POC: NORMAL

## 2023-03-22 PROCEDURE — 93793 ANTICOAG MGMT PT WARFARIN: CPT | Performed by: INTERNAL MEDICINE

## 2023-03-22 PROCEDURE — 85610 PROTHROMBIN TIME: CPT | Performed by: INTERNAL MEDICINE

## 2023-03-22 NOTE — PATIENT INSTRUCTIONS
Reminder: Please contact the Coumadin Clinic at [phone] when you have medication changes. Examples, new medications, antibiotics, discontinued medications, new supplements, missed doses of warfarin or if you took extra doses of warfarin. This also includes OTC medications. Notifying us helps reduce the possibility of high and low INR's. In addition, if warfarin needs to be held for any procedures, please have surgeon or physician's office contact us before holding anticoagulant. Thanks, [address] [facility] Cou[facility]in [facility].

## 2023-04-18 ENCOUNTER — ANTI-COAG VISIT (OUTPATIENT)
Dept: CARDIOLOGY CLINIC | Age: 78
End: 2023-04-18
Payer: MEDICARE

## 2023-04-18 DIAGNOSIS — I48.20 CHRONIC ATRIAL FIBRILLATION, UNSPECIFIED (HCC): ICD-10-CM

## 2023-04-18 DIAGNOSIS — Z95.2 HISTORY OF MITRAL VALVE REPLACEMENT WITH MECHANICAL VALVE: Primary | ICD-10-CM

## 2023-04-18 DIAGNOSIS — Z95.3 HISTORY OF AORTIC VALVE REPLACEMENT WITH BIOPROSTHETIC VALVE: ICD-10-CM

## 2023-04-18 DIAGNOSIS — Z95.2 PRESENCE OF PROSTHETIC HEART VALVE: ICD-10-CM

## 2023-04-18 DIAGNOSIS — Z79.01 LONG TERM (CURRENT) USE OF ANTICOAGULANTS: ICD-10-CM

## 2023-04-18 DIAGNOSIS — I48.21 PERMANENT ATRIAL FIBRILLATION (HCC): ICD-10-CM

## 2023-04-18 LAB
POC INR: 2.7
PROTHROMBIN TIME, POC: NORMAL

## 2023-04-18 PROCEDURE — 93793 ANTICOAG MGMT PT WARFARIN: CPT | Performed by: INTERNAL MEDICINE

## 2023-04-18 PROCEDURE — 85610 PROTHROMBIN TIME: CPT | Performed by: INTERNAL MEDICINE

## 2023-04-18 NOTE — PATIENT INSTRUCTIONS
Reminder: Please contact the Coumadin Clinic at [phone] when you have medication changes. Examples, new medications, antibiotics, discontinued medications, new supplements, missed doses of warfarin or if you took extra doses of warfarin. This also includes OTC medications. Notifying us helps reduce the possibility of high and low INR's. In addition, if warfarin needs to be held for any procedures, please have surgeon or physician's office contact us before holding anticoagulant. Thanks, [facility] Coumadin Clinic. No

## 2023-04-30 ASSESSMENT — ENCOUNTER SYMPTOMS
DIARRHEA: 0
SORE THROAT: 0
PHOTOPHOBIA: 0
ABDOMINAL DISTENTION: 0
SHORTNESS OF BREATH: 0
ABDOMINAL PAIN: 0
CONSTIPATION: 0
COUGH: 0

## 2023-05-02 ENCOUNTER — OFFICE VISIT (OUTPATIENT)
Dept: CARDIOLOGY CLINIC | Age: 78
End: 2023-05-02
Payer: MEDICARE

## 2023-05-02 ENCOUNTER — ANTI-COAG VISIT (OUTPATIENT)
Dept: CARDIOLOGY CLINIC | Age: 78
End: 2023-05-02
Payer: MEDICARE

## 2023-05-02 VITALS
WEIGHT: 182 LBS | DIASTOLIC BLOOD PRESSURE: 78 MMHG | BODY MASS INDEX: 26.05 KG/M2 | HEIGHT: 70 IN | SYSTOLIC BLOOD PRESSURE: 120 MMHG

## 2023-05-02 DIAGNOSIS — I10 PRIMARY HYPERTENSION: ICD-10-CM

## 2023-05-02 DIAGNOSIS — I33.0 CHRONIC BACTERIAL ENDOCARDITIS: Chronic | ICD-10-CM

## 2023-05-02 DIAGNOSIS — I48.21 PERMANENT ATRIAL FIBRILLATION (HCC): ICD-10-CM

## 2023-05-02 DIAGNOSIS — Z79.01 LONG TERM (CURRENT) USE OF ANTICOAGULANTS: ICD-10-CM

## 2023-05-02 DIAGNOSIS — Z95.2 HISTORY OF MITRAL VALVE REPLACEMENT WITH MECHANICAL VALVE: Primary | ICD-10-CM

## 2023-05-02 DIAGNOSIS — Z95.2 PRESENCE OF PROSTHETIC HEART VALVE: ICD-10-CM

## 2023-05-02 DIAGNOSIS — I50.22 CHRONIC SYSTOLIC HEART FAILURE (HCC): Primary | ICD-10-CM

## 2023-05-02 DIAGNOSIS — I48.20 CHRONIC ATRIAL FIBRILLATION, UNSPECIFIED (HCC): ICD-10-CM

## 2023-05-02 DIAGNOSIS — Z95.3 HISTORY OF AORTIC VALVE REPLACEMENT WITH BIOPROSTHETIC VALVE: ICD-10-CM

## 2023-05-02 DIAGNOSIS — Z95.2 HISTORY OF MITRAL VALVE REPLACEMENT WITH MECHANICAL VALVE: ICD-10-CM

## 2023-05-02 LAB
POC INR: 2.2
PROTHROMBIN TIME, POC: ABNORMAL

## 2023-05-02 PROCEDURE — G8427 DOCREV CUR MEDS BY ELIG CLIN: HCPCS | Performed by: INTERNAL MEDICINE

## 2023-05-02 PROCEDURE — 1123F ACP DISCUSS/DSCN MKR DOCD: CPT | Performed by: INTERNAL MEDICINE

## 2023-05-02 PROCEDURE — 93000 ELECTROCARDIOGRAM COMPLETE: CPT | Performed by: INTERNAL MEDICINE

## 2023-05-02 PROCEDURE — 85610 PROTHROMBIN TIME: CPT | Performed by: INTERNAL MEDICINE

## 2023-05-02 PROCEDURE — 99214 OFFICE O/P EST MOD 30 MIN: CPT | Performed by: INTERNAL MEDICINE

## 2023-05-02 PROCEDURE — 3078F DIAST BP <80 MM HG: CPT | Performed by: INTERNAL MEDICINE

## 2023-05-02 PROCEDURE — G8417 CALC BMI ABV UP PARAM F/U: HCPCS | Performed by: INTERNAL MEDICINE

## 2023-05-02 PROCEDURE — 3074F SYST BP LT 130 MM HG: CPT | Performed by: INTERNAL MEDICINE

## 2023-05-02 PROCEDURE — 1036F TOBACCO NON-USER: CPT | Performed by: INTERNAL MEDICINE

## 2023-05-02 PROCEDURE — 1090F PRES/ABSN URINE INCON ASSESS: CPT | Performed by: INTERNAL MEDICINE

## 2023-05-02 PROCEDURE — G8400 PT W/DXA NO RESULTS DOC: HCPCS | Performed by: INTERNAL MEDICINE

## 2023-05-02 RX ORDER — SUCRALFATE 1 G/1
1 TABLET ORAL 2 TIMES DAILY
COMMUNITY

## 2023-05-02 RX ORDER — WARFARIN SODIUM 5 MG/1
TABLET ORAL
Qty: 30 TABLET | Refills: 11 | Status: SHIPPED | OUTPATIENT
Start: 2023-05-02 | End: 2023-05-05 | Stop reason: SDUPTHER

## 2023-05-02 RX ORDER — CEFADROXIL 500 MG/1
500 CAPSULE ORAL 2 TIMES DAILY
Qty: 180 CAPSULE | Refills: 3 | Status: SHIPPED | OUTPATIENT
Start: 2023-05-02 | End: 2023-05-05 | Stop reason: SDUPTHER

## 2023-05-02 NOTE — PATIENT INSTRUCTIONS
Reminder: Please contact the Coumadin Clinic at [phone] when you have medication changes. Examples, new medications, antibiotics, discontinued medications, new supplements, missed doses of warfarin or if you took extra doses of warfarin. This also includes OTC medications. Notifying us helps reduce the possibility of high and low INR's. In addition, if warfarin needs to be held for any procedures, please have surgeon or physician's office contact us before holding anticoagulant. Thanks, [facility] Coumadin Clinic.

## 2023-05-02 NOTE — PROGRESS NOTES
Tablet strength and weekly dosing schedule confirmed today. Patient knows of no reason for subtherapeutic INR result. One time dose of 5 mg tonight instead of 2.5 mg. Then continue current maintenance plan (see Anticoag Dosing Calendar). INR to be rechecked in two week(s).

## 2023-05-03 NOTE — TELEPHONE ENCOUNTER
Pt states that when her Rxs Warfarin and Cefadroxil was sent into the pharmacy it was suppose to be for a 90 day supply but was only sent for 30 day supply and pt is going out of town and wants the 90 day supply for the medications, and to be sent to [facility] on [address].  Pt would appreciate a call back

## 2023-05-05 RX ORDER — CEFADROXIL 500 MG/1
500 CAPSULE ORAL 2 TIMES DAILY
Qty: 180 CAPSULE | Refills: 3 | Status: SHIPPED | OUTPATIENT
Start: 2023-05-05

## 2023-05-05 RX ORDER — WARFARIN SODIUM 5 MG/1
TABLET ORAL
Qty: 90 TABLET | Refills: 3 | Status: SHIPPED | OUTPATIENT
Start: 2023-05-05

## 2023-06-07 ENCOUNTER — ANTI-COAG VISIT (OUTPATIENT)
Age: 78
End: 2023-06-07
Payer: MEDICARE

## 2023-06-07 DIAGNOSIS — Z79.01 LONG TERM (CURRENT) USE OF ANTICOAGULANTS: ICD-10-CM

## 2023-06-07 DIAGNOSIS — Z95.3 HISTORY OF AORTIC VALVE REPLACEMENT WITH BIOPROSTHETIC VALVE: ICD-10-CM

## 2023-06-07 DIAGNOSIS — I48.20 CHRONIC ATRIAL FIBRILLATION, UNSPECIFIED (HCC): ICD-10-CM

## 2023-06-07 DIAGNOSIS — Z95.2 HISTORY OF MITRAL VALVE REPLACEMENT WITH MECHANICAL VALVE: Primary | ICD-10-CM

## 2023-06-07 DIAGNOSIS — I48.21 PERMANENT ATRIAL FIBRILLATION (HCC): ICD-10-CM

## 2023-06-07 DIAGNOSIS — Z95.2 PRESENCE OF PROSTHETIC HEART VALVE: ICD-10-CM

## 2023-06-07 LAB
POC INR: 2.2
PROTHROMBIN TIME, POC: ABNORMAL

## 2023-06-07 PROCEDURE — 93793 ANTICOAG MGMT PT WARFARIN: CPT | Performed by: INTERNAL MEDICINE

## 2023-06-07 PROCEDURE — 85610 PROTHROMBIN TIME: CPT | Performed by: INTERNAL MEDICINE

## 2023-06-22 ENCOUNTER — ANTI-COAG VISIT (OUTPATIENT)
Age: 78
End: 2023-06-22
Payer: MEDICARE

## 2023-06-22 DIAGNOSIS — Z95.2 HISTORY OF MITRAL VALVE REPLACEMENT WITH MECHANICAL VALVE: Primary | ICD-10-CM

## 2023-06-22 DIAGNOSIS — Z79.01 LONG TERM (CURRENT) USE OF ANTICOAGULANTS: ICD-10-CM

## 2023-06-22 DIAGNOSIS — I48.21 PERMANENT ATRIAL FIBRILLATION (HCC): ICD-10-CM

## 2023-06-22 DIAGNOSIS — Z95.3 HISTORY OF AORTIC VALVE REPLACEMENT WITH BIOPROSTHETIC VALVE: ICD-10-CM

## 2023-06-22 LAB
POC INR: 2.1
PROTHROMBIN TIME, POC: ABNORMAL

## 2023-06-22 PROCEDURE — 85610 PROTHROMBIN TIME: CPT | Performed by: INTERNAL MEDICINE

## 2023-06-22 PROCEDURE — 93793 ANTICOAG MGMT PT WARFARIN: CPT | Performed by: INTERNAL MEDICINE

## 2023-06-22 NOTE — PATIENT INSTRUCTIONS
Reminder: Please contact the Coumadin Clinic at [phone] when you have medication changes. Examples, new medications, antibiotics, discontinued medications, new supplements, missed doses of warfarin or if you took extra doses of warfarin. This also includes OTC medications. Notifying us helps reduce the possibility of high and low INR's. In addition, if warfarin needs to be held for any procedures, please have surgeon or physician's office contact us before holding anticoagulant. Thanks, [facility] Coumadin [facility].

## 2023-07-06 ENCOUNTER — ANTI-COAG VISIT (OUTPATIENT)
Age: 78
End: 2023-07-06

## 2023-07-06 DIAGNOSIS — I10 ESSENTIAL HYPERTENSION: ICD-10-CM

## 2023-07-06 DIAGNOSIS — Z95.2 HISTORY OF MITRAL VALVE REPLACEMENT WITH MECHANICAL VALVE: Primary | ICD-10-CM

## 2023-07-06 DIAGNOSIS — Z95.3 HISTORY OF AORTIC VALVE REPLACEMENT WITH BIOPROSTHETIC VALVE: ICD-10-CM

## 2023-07-06 DIAGNOSIS — Z79.01 LONG TERM (CURRENT) USE OF ANTICOAGULANTS: ICD-10-CM

## 2023-07-06 DIAGNOSIS — E03.9 ACQUIRED HYPOTHYROIDISM: ICD-10-CM

## 2023-07-06 DIAGNOSIS — E78.5 DYSLIPIDEMIA: ICD-10-CM

## 2023-07-06 DIAGNOSIS — I48.21 PERMANENT ATRIAL FIBRILLATION (HCC): ICD-10-CM

## 2023-07-06 DIAGNOSIS — Z11.59 NEED FOR HEPATITIS C SCREENING TEST: Primary | ICD-10-CM

## 2023-07-06 LAB
POC INR: 3.6
PROTHROMBIN TIME, POC: ABNORMAL

## 2023-07-06 NOTE — PATIENT INSTRUCTIONS
Reminder: Please contact the Coumadin Clinic at [phone] when you have medication changes. Examples, new medications, antibiotics, discontinued medications, new supplements, missed doses of warfarin or if you took extra doses of warfarin. This also includes OTC medications. Notifying us helps reduce the possibility of high and low INR's. In addition, if warfarin needs to be held for any procedures, please have surgeon or physician's office contact us before holding anticoagulant. Thanks, [facility] Coumadin Clinic.

## 2023-07-06 NOTE — PROGRESS NOTES
Warfarin tablet strength and weekly dosing schedule confirmed today. Due to the increase of her INR, I reduced one of her days back to 2.5 mg. Recheck in a few weeks, patient will be out of town in two weeks.

## 2023-07-14 DIAGNOSIS — E03.9 ACQUIRED HYPOTHYROIDISM: ICD-10-CM

## 2023-07-14 DIAGNOSIS — E78.5 DYSLIPIDEMIA: ICD-10-CM

## 2023-07-14 DIAGNOSIS — I10 ESSENTIAL HYPERTENSION: ICD-10-CM

## 2023-07-14 DIAGNOSIS — Z11.59 NEED FOR HEPATITIS C SCREENING TEST: ICD-10-CM

## 2023-07-14 LAB
ALBUMIN SERPL-MCNC: 4 G/DL (ref 3.2–4.6)
ALBUMIN/GLOB SERPL: 1.3 (ref 0.4–1.6)
ALP SERPL-CCNC: 87 U/L (ref 50–136)
ALT SERPL-CCNC: 18 U/L (ref 12–65)
ANION GAP SERPL CALC-SCNC: 6 MMOL/L (ref 2–11)
AST SERPL-CCNC: 20 U/L (ref 15–37)
BASOPHILS # BLD: 0 K/UL (ref 0–0.2)
BASOPHILS NFR BLD: 1 % (ref 0–2)
BILIRUB SERPL-MCNC: 0.6 MG/DL (ref 0.2–1.1)
BUN SERPL-MCNC: 21 MG/DL (ref 8–23)
CALCIUM SERPL-MCNC: 9.4 MG/DL (ref 8.3–10.4)
CHLORIDE SERPL-SCNC: 109 MMOL/L (ref 101–110)
CHOLEST SERPL-MCNC: 229 MG/DL
CO2 SERPL-SCNC: 27 MMOL/L (ref 21–32)
CREAT SERPL-MCNC: 1.2 MG/DL (ref 0.6–1)
DIFFERENTIAL METHOD BLD: ABNORMAL
EOSINOPHIL # BLD: 0.1 K/UL (ref 0–0.8)
EOSINOPHIL NFR BLD: 3 % (ref 0.5–7.8)
ERYTHROCYTE [DISTWIDTH] IN BLOOD BY AUTOMATED COUNT: 12.3 % (ref 11.9–14.6)
GLOBULIN SER CALC-MCNC: 3.1 G/DL (ref 2.8–4.5)
GLUCOSE SERPL-MCNC: 89 MG/DL (ref 65–100)
HCT VFR BLD AUTO: 34 % (ref 35.8–46.3)
HCV AB SER QL: NONREACTIVE
HDLC SERPL-MCNC: 49 MG/DL (ref 40–60)
HDLC SERPL: 4.7
HGB BLD-MCNC: 11 G/DL (ref 11.7–15.4)
IMM GRANULOCYTES # BLD AUTO: 0 K/UL (ref 0–0.5)
IMM GRANULOCYTES NFR BLD AUTO: 0 % (ref 0–5)
LDLC SERPL CALC-MCNC: 154.6 MG/DL
LYMPHOCYTES # BLD: 0.8 K/UL (ref 0.5–4.6)
LYMPHOCYTES NFR BLD: 17 % (ref 13–44)
MCH RBC QN AUTO: 30.7 PG (ref 26.1–32.9)
MCHC RBC AUTO-ENTMCNC: 32.4 G/DL (ref 31.4–35)
MCV RBC AUTO: 95 FL (ref 82–102)
MONOCYTES # BLD: 0.4 K/UL (ref 0.1–1.3)
MONOCYTES NFR BLD: 9 % (ref 4–12)
NEUTS SEG # BLD: 3.2 K/UL (ref 1.7–8.2)
NEUTS SEG NFR BLD: 70 % (ref 43–78)
NRBC # BLD: 0 K/UL (ref 0–0.2)
PLATELET # BLD AUTO: 170 K/UL (ref 150–450)
PMV BLD AUTO: 11.2 FL (ref 9.4–12.3)
POTASSIUM SERPL-SCNC: 4.4 MMOL/L (ref 3.5–5.1)
PROT SERPL-MCNC: 7.1 G/DL (ref 6.3–8.2)
RBC # BLD AUTO: 3.58 M/UL (ref 4.05–5.2)
SODIUM SERPL-SCNC: 142 MMOL/L (ref 133–143)
TRIGL SERPL-MCNC: 127 MG/DL (ref 35–150)
TSH, 3RD GENERATION: 1.49 UIU/ML (ref 0.36–3.74)
VLDLC SERPL CALC-MCNC: 25.4 MG/DL (ref 6–23)
WBC # BLD AUTO: 4.6 K/UL (ref 4.3–11.1)

## 2023-07-25 DIAGNOSIS — I48.21 PERMANENT ATRIAL FIBRILLATION (HCC): ICD-10-CM

## 2023-07-27 ENCOUNTER — ANTI-COAG VISIT (OUTPATIENT)
Age: 78
End: 2023-07-27

## 2023-07-27 DIAGNOSIS — I48.21 PERMANENT ATRIAL FIBRILLATION (HCC): ICD-10-CM

## 2023-07-27 DIAGNOSIS — Z79.01 LONG TERM (CURRENT) USE OF ANTICOAGULANTS: ICD-10-CM

## 2023-07-27 DIAGNOSIS — Z95.2 HISTORY OF MITRAL VALVE REPLACEMENT WITH MECHANICAL VALVE: Primary | ICD-10-CM

## 2023-07-27 DIAGNOSIS — Z95.3 HISTORY OF AORTIC VALVE REPLACEMENT WITH BIOPROSTHETIC VALVE: ICD-10-CM

## 2023-07-27 LAB
POC INR: 2.1
PROTHROMBIN TIME, POC: ABNORMAL

## 2023-07-27 NOTE — PATIENT INSTRUCTIONS
Reminder: Please contact the Coumadin Clinic at [phone] when you have medication changes. Examples, new medications, antibiotics, discontinued medications, new supplements, missed doses of warfarin or if you took extra doses of warfarin. This also includes OTC medications. Notifying us helps reduce the possibility of high and low INR's. In addition, if warfarin needs to be held for any procedures, please have surgeon or physician's office contact us before holding anticoagulant. Thanks, [facility] Card[facility] Coumadin Clinic.

## 2023-07-27 NOTE — PROGRESS NOTES
Warfarin tablet strength and weekly dosing schedule confirmed today. Patient knows of no reason for subtherapeutic INR result. Maintenance plan increased by 12.5 %, (see Anticoag Dosing Calendar). INR to be rechecked in two weeks.

## 2023-08-04 SDOH — ECONOMIC STABILITY: INCOME INSECURITY: HOW HARD IS IT FOR YOU TO PAY FOR THE VERY BASICS LIKE FOOD, HOUSING, MEDICAL CARE, AND HEATING?: NOT VERY HARD

## 2023-08-04 SDOH — ECONOMIC STABILITY: TRANSPORTATION INSECURITY
IN THE PAST 12 MONTHS, HAS LACK OF TRANSPORTATION KEPT YOU FROM MEETINGS, WORK, OR FROM GETTING THINGS NEEDED FOR DAILY LIVING?: NO

## 2023-08-04 SDOH — ECONOMIC STABILITY: HOUSING INSECURITY
IN THE LAST 12 MONTHS, WAS THERE A TIME WHEN YOU DID NOT HAVE A STEADY PLACE TO SLEEP OR SLEPT IN A SHELTER (INCLUDING NOW)?: NO

## 2023-08-04 SDOH — ECONOMIC STABILITY: FOOD INSECURITY: WITHIN THE PAST 12 MONTHS, YOU WORRIED THAT YOUR FOOD WOULD RUN OUT BEFORE YOU GOT MONEY TO BUY MORE.: NEVER TRUE

## 2023-08-04 SDOH — ECONOMIC STABILITY: FOOD INSECURITY: WITHIN THE PAST 12 MONTHS, THE FOOD YOU BOUGHT JUST DIDN'T LAST AND YOU DIDN'T HAVE MONEY TO GET MORE.: NEVER TRUE

## 2023-08-04 SDOH — HEALTH STABILITY: PHYSICAL HEALTH: ON AVERAGE, HOW MANY DAYS PER WEEK DO YOU ENGAGE IN MODERATE TO STRENUOUS EXERCISE (LIKE A BRISK WALK)?: 0 DAYS

## 2023-08-04 SDOH — HEALTH STABILITY: PHYSICAL HEALTH: ON AVERAGE, HOW MANY MINUTES DO YOU ENGAGE IN EXERCISE AT THIS LEVEL?: 0 MIN

## 2023-08-04 ASSESSMENT — PATIENT HEALTH QUESTIONNAIRE - PHQ9
2. FEELING DOWN, DEPRESSED OR HOPELESS: 0
SUM OF ALL RESPONSES TO PHQ QUESTIONS 1-9: 0
SUM OF ALL RESPONSES TO PHQ QUESTIONS 1-9: 0
SUM OF ALL RESPONSES TO PHQ9 QUESTIONS 1 & 2: 0
1. LITTLE INTEREST OR PLEASURE IN DOING THINGS: 0
SUM OF ALL RESPONSES TO PHQ QUESTIONS 1-9: 0
SUM OF ALL RESPONSES TO PHQ QUESTIONS 1-9: 0

## 2023-08-04 ASSESSMENT — LIFESTYLE VARIABLES
HOW OFTEN DO YOU HAVE A DRINK CONTAINING ALCOHOL: NEVER
HOW OFTEN DO YOU HAVE SIX OR MORE DRINKS ON ONE OCCASION: 1
HOW MANY STANDARD DRINKS CONTAINING ALCOHOL DO YOU HAVE ON A TYPICAL DAY: 0
HOW MANY STANDARD DRINKS CONTAINING ALCOHOL DO YOU HAVE ON A TYPICAL DAY: PATIENT DOES NOT DRINK
HOW OFTEN DO YOU HAVE A DRINK CONTAINING ALCOHOL: 1

## 2023-08-07 ENCOUNTER — OFFICE VISIT (OUTPATIENT)
Dept: INTERNAL MEDICINE CLINIC | Facility: CLINIC | Age: 78
End: 2023-08-07

## 2023-08-07 VITALS
HEIGHT: 70 IN | BODY MASS INDEX: 25.91 KG/M2 | HEART RATE: 74 BPM | WEIGHT: 181 LBS | TEMPERATURE: 97.2 F | DIASTOLIC BLOOD PRESSURE: 68 MMHG | OXYGEN SATURATION: 98 % | SYSTOLIC BLOOD PRESSURE: 122 MMHG

## 2023-08-07 DIAGNOSIS — Z95.0 CARDIAC PACEMAKER: ICD-10-CM

## 2023-08-07 DIAGNOSIS — I33.0 CHRONIC BACTERIAL ENDOCARDITIS: Chronic | ICD-10-CM

## 2023-08-07 DIAGNOSIS — E03.9 ACQUIRED HYPOTHYROIDISM: ICD-10-CM

## 2023-08-07 DIAGNOSIS — E78.5 DYSLIPIDEMIA: ICD-10-CM

## 2023-08-07 DIAGNOSIS — K92.2 ACUTE UPPER GASTROINTESTINAL HEMORRHAGE: ICD-10-CM

## 2023-08-07 DIAGNOSIS — D68.69 HYPERCOAGULABLE STATE, SECONDARY (HCC): ICD-10-CM

## 2023-08-07 DIAGNOSIS — Z00.00 MEDICARE ANNUAL WELLNESS VISIT, SUBSEQUENT: Primary | ICD-10-CM

## 2023-08-07 DIAGNOSIS — I50.22 CHRONIC SYSTOLIC HEART FAILURE (HCC): ICD-10-CM

## 2023-08-07 DIAGNOSIS — Z95.2 HISTORY OF PROSTHETIC HEART VALVE: ICD-10-CM

## 2023-08-07 RX ORDER — LEVOTHYROXINE SODIUM 0.05 MG/1
50 TABLET ORAL DAILY
Qty: 90 TABLET | Refills: 3 | Status: SHIPPED | OUTPATIENT
Start: 2023-08-07

## 2023-08-07 RX ORDER — POTASSIUM CHLORIDE 750 MG/1
10 TABLET, FILM COATED, EXTENDED RELEASE ORAL DAILY
Qty: 90 TABLET | Refills: 3 | Status: SHIPPED | OUTPATIENT
Start: 2023-08-07

## 2023-08-07 RX ORDER — MULTIVITAMIN/IRON/FOLIC ACID 18MG-0.4MG
TABLET ORAL
COMMUNITY

## 2023-08-07 RX ORDER — FUROSEMIDE 20 MG/1
20 TABLET ORAL DAILY
Qty: 90 TABLET | Refills: 3 | Status: SHIPPED | OUTPATIENT
Start: 2023-08-07

## 2023-08-07 NOTE — PROGRESS NOTES
Medicare Annual Wellness Visit    [address] ([facility]) is here for Medicare AWV, Atrial Fibrillation, Hypertension, Cholesterol Problem, and Medication Refill    Assessment & Plan   1. Medicare annual wellness visit, subsequent       2. Acquired hypothyroidism  Treatment regimen is effective. - levothyroxine (SYNTHROID) 50 MCG tablet; Take 1 tablet by mouth Daily TAKE ONE TABLET BY MOUTH ONE TIME DAILY BEFORE BREAKFAST  Dispense: 90 tablet; Refill: 3    3. Chronic systolic heart failure (HCC)  Appears euvolemic on exam.    Medication refilled      - furosemide (LASIX) 20 MG tablet; Take 1 tablet by mouth daily TAKE ONE TABLET BY MOUTH ONE TIME DAILY  Dispense: 90 tablet; Refill: 3    4. History of prosthetic heart valve  Followed by cardiology  On Coumadin. INR stable. 5. Chronic bacterial endocarditis  On abx chronic and     6. Hypercoagulable state, secondary ([address])  On coumadin chronic     7. Cardiac pacemaker  Followed by cardiology     8. Acute upper gastrointestinal hemorrhage  Labs in 3 months. Continue PPI. Treatment regimen is effective. - CBC; Future  - Basic Metabolic Panel; Future  - Iron; Future  - Ferritin; Future    9. Dyslipidemia  Elevated LDL. Chronic. Discussed statin therapy as an option. She is open to treatment but after our discussion would like to work on some healthier diet changes and re check in a few months. If statin is chosen, low dose will be chosen initially. She has no prior hx of pci or cad interventions. She is on chronic abx for chronic bacterial endocarditis and she has a pacemaker and mechanical valve. - Lipid Panel; Future    Medicare annual wellness visit, subsequent  Acquired hypothyroidism  -     levothyroxine (SYNTHROID) 50 MCG tablet; Take 1 tablet by mouth Daily TAKE ONE TABLET BY MOUTH ONE TIME DAILY BEFORE BREAKFAST, Disp-90 tablet, [id number]  Chronic systolic heart failure (HCC)  -     furosemide (LASIX) 20 MG tablet;  Take 1 tablet by

## 2023-08-10 ENCOUNTER — ANTI-COAG VISIT (OUTPATIENT)
Age: 78
End: 2023-08-10

## 2023-08-10 DIAGNOSIS — Z95.2 HISTORY OF MITRAL VALVE REPLACEMENT WITH MECHANICAL VALVE: ICD-10-CM

## 2023-08-10 DIAGNOSIS — Z95.3 HISTORY OF AORTIC VALVE REPLACEMENT WITH BIOPROSTHETIC VALVE: ICD-10-CM

## 2023-08-10 DIAGNOSIS — I48.21 PERMANENT ATRIAL FIBRILLATION (HCC): ICD-10-CM

## 2023-08-10 DIAGNOSIS — Z79.01 LONG TERM (CURRENT) USE OF ANTICOAGULANTS: ICD-10-CM

## 2023-08-10 LAB
POC INR: 3.8
PROTHROMBIN TIME, POC: ABNORMAL

## 2023-08-10 NOTE — PROGRESS NOTES
Warfarin tablet strength and weekly dosing schedule confirmed today. Patient will increase Vitamin K intake. Continue current maintenance plan (see Anticoag Dosing Calendar). INR to be rechecked in two week(s).

## 2023-08-10 NOTE — PATIENT INSTRUCTIONS
Reminder: Please contact the Coumadin Clinic at [phone] when you have medication changes. Examples, new medications, antibiotics, discontinued medications, new supplements, missed doses of warfarin or if you took extra doses of warfarin. This also includes OTC medications. Notifying us helps reduce the possibility of high and low INR's. In addition, if warfarin needs to be held for any procedures, please have surgeon or physician's office contact us before holding anticoagulant. Thanks, [facility]iology Coumadin Clinic.

## 2023-08-24 ENCOUNTER — ANTI-COAG VISIT (OUTPATIENT)
Age: 78
End: 2023-08-24

## 2023-08-24 DIAGNOSIS — I48.21 PERMANENT ATRIAL FIBRILLATION (HCC): ICD-10-CM

## 2023-08-24 DIAGNOSIS — Z79.01 LONG TERM (CURRENT) USE OF ANTICOAGULANTS: ICD-10-CM

## 2023-08-24 DIAGNOSIS — Z95.3 HISTORY OF AORTIC VALVE REPLACEMENT WITH BIOPROSTHETIC VALVE: ICD-10-CM

## 2023-08-24 DIAGNOSIS — Z95.2 HISTORY OF MITRAL VALVE REPLACEMENT WITH MECHANICAL VALVE: Primary | ICD-10-CM

## 2023-08-24 LAB
POC INR: 4
PROTHROMBIN TIME, POC: ABNORMAL

## 2023-09-07 ENCOUNTER — ANTI-COAG VISIT (OUTPATIENT)
Age: 78
End: 2023-09-07

## 2023-09-07 DIAGNOSIS — I48.21 PERMANENT ATRIAL FIBRILLATION (HCC): ICD-10-CM

## 2023-09-07 DIAGNOSIS — Z79.01 LONG TERM (CURRENT) USE OF ANTICOAGULANTS: ICD-10-CM

## 2023-09-07 DIAGNOSIS — Z95.2 HISTORY OF MITRAL VALVE REPLACEMENT WITH MECHANICAL VALVE: Primary | ICD-10-CM

## 2023-09-07 DIAGNOSIS — Z95.3 HISTORY OF AORTIC VALVE REPLACEMENT WITH BIOPROSTHETIC VALVE: ICD-10-CM

## 2023-09-07 LAB
POC INR: 2.6
PROTHROMBIN TIME, POC: NORMAL

## 2023-09-07 NOTE — PATIENT INSTRUCTIONS
Reminder: Please contact the Coumadin Clinic at [phone] when you have medication changes. Examples, new medications, antibiotics, discontinued medications, new supplements, missed doses of warfarin or if you took extra doses of warfarin. This also includes OTC medications. Notifying us helps reduce the possibility of high and low INR's. In addition, if warfarin needs to be held for any procedures, please have surgeon or physician's office contact us before holding anticoagulant. Thanks, [facility] Cardiology Coumadin Clinic.

## 2023-09-21 ENCOUNTER — ANTI-COAG VISIT (OUTPATIENT)
Age: 78
End: 2023-09-21

## 2023-09-21 DIAGNOSIS — I48.21 PERMANENT ATRIAL FIBRILLATION (HCC): ICD-10-CM

## 2023-09-21 DIAGNOSIS — Z79.01 LONG TERM (CURRENT) USE OF ANTICOAGULANTS: ICD-10-CM

## 2023-09-21 DIAGNOSIS — Z95.3 HISTORY OF AORTIC VALVE REPLACEMENT WITH BIOPROSTHETIC VALVE: ICD-10-CM

## 2023-09-21 DIAGNOSIS — Z95.2 HISTORY OF MITRAL VALVE REPLACEMENT WITH MECHANICAL VALVE: Primary | ICD-10-CM

## 2023-09-21 LAB
POC INR: 2.8
PROTHROMBIN TIME, POC: NORMAL

## 2023-09-21 NOTE — PATIENT INSTRUCTIONS
Reminder: Please contact the Coumadin Clinic at [phone] when you have medication changes. Examples, new medications, antibiotics, discontinued medications, new supplements, missed doses of warfarin or if you took extra doses of warfarin. This also includes OTC medications. Notifying us helps reduce the possibility of high and low INR's. In addition, if warfarin needs to be held for any procedures, please have surgeon or physician's office contact us before holding anticoagulant. Thanks, [place] [facility].

## 2023-09-27 DIAGNOSIS — I50.22 CHRONIC SYSTOLIC HEART FAILURE (HCC): ICD-10-CM

## 2023-09-27 RX ORDER — FUROSEMIDE 20 MG/1
20 TABLET ORAL DAILY
Qty: 90 TABLET | Refills: 3 | Status: SHIPPED | OUTPATIENT
Start: 2023-09-27

## 2023-09-27 NOTE — TELEPHONE ENCOUNTER
MEDICATION REFILL REQUEST      Name of Medication:  furosemide   Dose:  20 mg  Frequency:    Quantity:    Days' supply:  80      Pharmacy Name/Location:  did not leave

## 2023-10-02 ENCOUNTER — TELEPHONE (OUTPATIENT)
Age: 78
End: 2023-10-02

## 2023-10-05 NOTE — TELEPHONE ENCOUNTER
Dr. Annemarie Sheldon signed Tamika Curet today. Will send over to [facility] for pt to .      Left voicemail for pt to call back and ask for [facility]

## 2023-10-05 NOTE — TELEPHONE ENCOUNTER
Called pt, informed her that form has been signed.  Placing it in the basket to go back to [place] to

## 2023-10-06 ENCOUNTER — TELEPHONE (OUTPATIENT)
Age: 78
End: 2023-10-06

## 2023-10-06 DIAGNOSIS — I48.21 PERMANENT ATRIAL FIBRILLATION (HCC): Primary | ICD-10-CM

## 2023-10-06 DIAGNOSIS — Z01.89 NEED FOR ASSESSMENT FOR SLEEP APNEA: ICD-10-CM

## 2023-10-06 NOTE — TELEPHONE ENCOUNTER
----- Message from Sophia Garcia MD sent at 10/5/2023  4:40 PM EDT -----  Regarding: FW: Sleep apnea   Contact: [phone]        ----- Message -----  From: Benedicto Sevilla MA  Sent: 10/5/2023   3:11 PM EDT  To: Sophia Garcia MD  Subject: Rosy Hooks: Sleep apnea                                    ----- Message -----  From: Sadia Ardon MA  Sent: 10/5/2023   2:47 PM EDT  To: Lavonna Romberg, MA  Subject: FW: Sleep apnea                                    ----- Message -----  From: Nayana Davison  Sent: 10/5/2023   2:43 PM EDT  To: [facility] Card[facility] Clinical Staff  Subject: Sleep apnea                                      I am concerned about my sleep issues. Specifically, excessive daytime sleepiness, getting up numerous times at night to urinate and fitful sleep with excessive movement. I had been diagnosed with MARK ANTHONY in 2008 and tried cpap for about 6 months before discontinuing for my failure to tolerate the therapy . My recent information has convinced me to try this therapy again! . I understand that at home diagnostics are now available and ask for your help in starting this process.

## 2023-10-13 DIAGNOSIS — E03.9 ACQUIRED HYPOTHYROIDISM: ICD-10-CM

## 2023-10-13 RX ORDER — LEVOTHYROXINE SODIUM 0.05 MG/1
50 TABLET ORAL
Qty: 90 TABLET | Refills: 3 | OUTPATIENT
Start: 2023-10-13

## 2023-10-20 ENCOUNTER — ANTI-COAG VISIT (OUTPATIENT)
Age: 78
End: 2023-10-20

## 2023-10-20 DIAGNOSIS — I48.21 PERMANENT ATRIAL FIBRILLATION (HCC): ICD-10-CM

## 2023-10-20 DIAGNOSIS — Z79.01 LONG TERM (CURRENT) USE OF ANTICOAGULANTS: ICD-10-CM

## 2023-10-20 DIAGNOSIS — Z95.2 HISTORY OF MITRAL VALVE REPLACEMENT WITH MECHANICAL VALVE: Primary | ICD-10-CM

## 2023-10-20 DIAGNOSIS — Z95.3 HISTORY OF AORTIC VALVE REPLACEMENT WITH BIOPROSTHETIC VALVE: ICD-10-CM

## 2023-10-20 LAB
POC INR: 3.2
PROTHROMBIN TIME, POC: NORMAL

## 2023-10-20 NOTE — PATIENT INSTRUCTIONS
Reminder: Please contact the Coumadin Clinic at [phone] when you have medication changes. Examples, new medications, antibiotics, discontinued medications, new supplements, missed doses of warfarin or if you took extra doses of warfarin. This also includes OTC medications. Notifying us helps reduce the possibility of high and low INR's. In addition, if warfarin needs to be held for any procedures, please have surgeon or physician's office contact us before holding anticoagulant. Thanks, [facility]iology Coumadin Clinic.

## 2023-10-28 ASSESSMENT — ENCOUNTER SYMPTOMS
CONSTIPATION: 0
DIARRHEA: 0
COUGH: 0
PHOTOPHOBIA: 0
SHORTNESS OF BREATH: 0
ABDOMINAL DISTENTION: 0
ABDOMINAL PAIN: 0
SORE THROAT: 0

## 2023-10-28 NOTE — PROGRESS NOTES
07/14/2023    CHOL 209 (H) 07/28/2022     Lab Results   Component Value Date    TRIG 127 07/14/2023    TRIG 129 07/28/2022     Lab Results   Component Value Date    HDL 49 07/14/2023    HDL 48 07/28/2022     Lab Results   Component Value Date    LDLCALC 154.6 (H) 07/14/2023    LDLCALC 135.2 (H) 07/28/2022     Lab Results   Component Value Date    LABVLDL 25.4 (H) 07/14/2023    LABVLDL 25.8 (H) 07/28/2022     Lab Results   Component Value Date    CHOLHDLRATIO 4.7 07/14/2023    CHOLHDLRATIO 4.4 07/28/2022        Lab Results   Component Value Date/Time     07/14/2023 12:45 PM    K 4.4 07/14/2023 12:45 PM     07/14/2023 12:45 PM    CO2 27 07/14/2023 12:45 PM    BUN 21 07/14/2023 12:45 PM    CREATININE 1.20 07/14/2023 12:45 PM    GLUCOSE 89 07/14/2023 12:45 PM    CALCIUM 9.4 07/14/2023 12:45 PM         No results for input(s): \"WBC\", \"HGB\", \"HCT\", \"MCV\", \"PLT\" in the last 720 hours. No results found for: \"LABA1C\"  No results found for: \"EAG\"     No results found for: \"BNP\"     Lab Results   Component Value Date    TSH 1.920 09/26/2019       Results for orders placed or performed in visit on 11/01/23   EKG 12 Lead - Clinic Performed    Impression    Electronic ventricular pacemaker 73 bpm  Nonspecific ST-T wave changes  Pacemaker ECG, No further analysis         ASSESSMENT and PLAN     Diagnoses and all orders for this visit:      Permanent atrial fibrillation (HCC) - rate controlled, lifelong coumadin as tolerated- no recent bleeding      Chronic systolic heart failure ([address]) - resolved, EF 49% on echo - euvolemic and asymptomatic at present. Recheck echo Q12 months in our office to reassess valve gradients.        Chronic bacterial endocarditis - stable, controlled, lifelong suppressive therapy per ID recs      Mechanical mitral valve present - stable, coumadin and ABX lifelong      History of aortic valve replacement with bioprosthetic valve - stable, doing well- yearly echo      Essential hypertension

## 2023-11-01 ENCOUNTER — OFFICE VISIT (OUTPATIENT)
Age: 78
End: 2023-11-01
Payer: MEDICARE

## 2023-11-01 VITALS
DIASTOLIC BLOOD PRESSURE: 82 MMHG | HEART RATE: 73 BPM | WEIGHT: 181 LBS | HEIGHT: 67 IN | BODY MASS INDEX: 28.41 KG/M2 | SYSTOLIC BLOOD PRESSURE: 136 MMHG

## 2023-11-01 DIAGNOSIS — I48.20 CHRONIC A-FIB (HCC): ICD-10-CM

## 2023-11-01 DIAGNOSIS — I50.22 CHRONIC SYSTOLIC HEART FAILURE (HCC): ICD-10-CM

## 2023-11-01 DIAGNOSIS — I10 PRIMARY HYPERTENSION: ICD-10-CM

## 2023-11-01 DIAGNOSIS — Z95.0 PACEMAKER: ICD-10-CM

## 2023-11-01 DIAGNOSIS — Z95.0 CARDIAC PACEMAKER: ICD-10-CM

## 2023-11-01 DIAGNOSIS — I33.0 CHRONIC BACTERIAL ENDOCARDITIS: Primary | Chronic | ICD-10-CM

## 2023-11-01 PROCEDURE — G8417 CALC BMI ABV UP PARAM F/U: HCPCS | Performed by: INTERNAL MEDICINE

## 2023-11-01 PROCEDURE — G8484 FLU IMMUNIZE NO ADMIN: HCPCS | Performed by: INTERNAL MEDICINE

## 2023-11-01 PROCEDURE — 3078F DIAST BP <80 MM HG: CPT | Performed by: INTERNAL MEDICINE

## 2023-11-01 PROCEDURE — 1036F TOBACCO NON-USER: CPT | Performed by: INTERNAL MEDICINE

## 2023-11-01 PROCEDURE — 3075F SYST BP GE 130 - 139MM HG: CPT | Performed by: INTERNAL MEDICINE

## 2023-11-01 PROCEDURE — 93000 ELECTROCARDIOGRAM COMPLETE: CPT | Performed by: INTERNAL MEDICINE

## 2023-11-01 PROCEDURE — 99214 OFFICE O/P EST MOD 30 MIN: CPT | Performed by: INTERNAL MEDICINE

## 2023-11-01 PROCEDURE — 1090F PRES/ABSN URINE INCON ASSESS: CPT | Performed by: INTERNAL MEDICINE

## 2023-11-01 PROCEDURE — G8399 PT W/DXA RESULTS DOCUMENT: HCPCS | Performed by: INTERNAL MEDICINE

## 2023-11-01 PROCEDURE — G8427 DOCREV CUR MEDS BY ELIG CLIN: HCPCS | Performed by: INTERNAL MEDICINE

## 2023-11-01 PROCEDURE — 1123F ACP DISCUSS/DSCN MKR DOCD: CPT | Performed by: INTERNAL MEDICINE

## 2023-11-02 NOTE — TELEPHONE ENCOUNTER
MEDICATION REFILL REQUEST      Name of Medication:  Potassium Chloride   Dose:  10 meq  Frequency:  daily   Quantity:    Days' supply:  80      Pharmacy Name/Location:  [facility]. / patient stated that pharmacy said she had no more refills.

## 2023-11-05 RX ORDER — POTASSIUM CHLORIDE 750 MG/1
10 TABLET, FILM COATED, EXTENDED RELEASE ORAL DAILY
Qty: 90 TABLET | Refills: 3 | Status: SHIPPED | OUTPATIENT
Start: 2023-11-05

## 2023-11-15 DIAGNOSIS — E78.5 DYSLIPIDEMIA: ICD-10-CM

## 2023-11-15 DIAGNOSIS — K92.2 ACUTE UPPER GASTROINTESTINAL HEMORRHAGE: ICD-10-CM

## 2023-11-15 LAB
ANION GAP SERPL CALC-SCNC: 6 MMOL/L (ref 2–11)
BUN SERPL-MCNC: 23 MG/DL (ref 8–23)
CALCIUM SERPL-MCNC: 8.8 MG/DL (ref 8.3–10.4)
CHLORIDE SERPL-SCNC: 106 MMOL/L (ref 101–110)
CHOLEST SERPL-MCNC: 221 MG/DL
CO2 SERPL-SCNC: 29 MMOL/L (ref 21–32)
CREAT SERPL-MCNC: 1.1 MG/DL (ref 0.6–1)
ERYTHROCYTE [DISTWIDTH] IN BLOOD BY AUTOMATED COUNT: 12.8 % (ref 11.9–14.6)
FERRITIN SERPL-MCNC: 53 NG/ML (ref 8–388)
GLUCOSE SERPL-MCNC: 93 MG/DL (ref 65–100)
HCT VFR BLD AUTO: 36.3 % (ref 35.8–46.3)
HDLC SERPL-MCNC: 43 MG/DL (ref 40–60)
HDLC SERPL: 5.1
HGB BLD-MCNC: 11.7 G/DL (ref 11.7–15.4)
IRON SERPL-MCNC: 59 UG/DL (ref 35–150)
LDLC SERPL CALC-MCNC: 118.4 MG/DL
MCH RBC QN AUTO: 30 PG (ref 26.1–32.9)
MCHC RBC AUTO-ENTMCNC: 32.2 G/DL (ref 31.4–35)
MCV RBC AUTO: 93.1 FL (ref 82–102)
NRBC # BLD: 0 K/UL (ref 0–0.2)
PLATELET # BLD AUTO: 223 K/UL (ref 150–450)
PMV BLD AUTO: 10.4 FL (ref 9.4–12.3)
POTASSIUM SERPL-SCNC: 3.8 MMOL/L (ref 3.5–5.1)
RBC # BLD AUTO: 3.9 M/UL (ref 4.05–5.2)
SODIUM SERPL-SCNC: 141 MMOL/L (ref 133–143)
TRIGL SERPL-MCNC: 298 MG/DL (ref 35–150)
VLDLC SERPL CALC-MCNC: 59.6 MG/DL (ref 6–23)
WBC # BLD AUTO: 6.4 K/UL (ref 4.3–11.1)

## 2023-11-17 ENCOUNTER — ANTI-COAG VISIT (OUTPATIENT)
Age: 78
End: 2023-11-17

## 2023-11-17 DIAGNOSIS — Z95.2 HISTORY OF MITRAL VALVE REPLACEMENT WITH MECHANICAL VALVE: Primary | ICD-10-CM

## 2023-11-17 DIAGNOSIS — I48.21 PERMANENT ATRIAL FIBRILLATION (HCC): ICD-10-CM

## 2023-11-17 DIAGNOSIS — Z79.01 LONG TERM (CURRENT) USE OF ANTICOAGULANTS: ICD-10-CM

## 2023-11-17 DIAGNOSIS — Z95.3 HISTORY OF AORTIC VALVE REPLACEMENT WITH BIOPROSTHETIC VALVE: ICD-10-CM

## 2023-11-17 LAB
POC INR: 2.8
PROTHROMBIN TIME, POC: NORMAL

## 2023-11-17 NOTE — PATIENT INSTRUCTIONS
Reminder: Please contact the Coumadin Clinic at [phone] when you have medication changes. Examples, new medications, antibiotics, discontinued medications, new supplements, missed doses of warfarin or if you took extra doses of warfarin. This also includes OTC medications. Notifying us helps reduce the possibility of high and low INR's. In addition, if warfarin needs to be held for any procedures, please have surgeon or physician's office contact us before holding anticoagulant. Thanks, [facility] Coumadin Clinic.

## 2023-11-20 ENCOUNTER — OFFICE VISIT (OUTPATIENT)
Dept: INTERNAL MEDICINE CLINIC | Facility: CLINIC | Age: 78
End: 2023-11-20
Payer: MEDICARE

## 2023-11-20 VITALS
SYSTOLIC BLOOD PRESSURE: 128 MMHG | HEART RATE: 49 BPM | OXYGEN SATURATION: 98 % | HEIGHT: 70 IN | WEIGHT: 180 LBS | BODY MASS INDEX: 25.77 KG/M2 | DIASTOLIC BLOOD PRESSURE: 76 MMHG | TEMPERATURE: 97.2 F

## 2023-11-20 DIAGNOSIS — I33.0 CHRONIC BACTERIAL ENDOCARDITIS: Chronic | ICD-10-CM

## 2023-11-20 DIAGNOSIS — I48.20 CHRONIC A-FIB (HCC): ICD-10-CM

## 2023-11-20 DIAGNOSIS — E03.9 ACQUIRED HYPOTHYROIDISM: ICD-10-CM

## 2023-11-20 DIAGNOSIS — Z95.2 HISTORY OF MITRAL VALVE REPLACEMENT WITH MECHANICAL VALVE: ICD-10-CM

## 2023-11-20 DIAGNOSIS — E78.5 DYSLIPIDEMIA: Primary | ICD-10-CM

## 2023-11-20 DIAGNOSIS — Z95.3 HISTORY OF AORTIC VALVE REPLACEMENT WITH BIOPROSTHETIC VALVE: ICD-10-CM

## 2023-11-20 DIAGNOSIS — I48.21 PERMANENT ATRIAL FIBRILLATION (HCC): ICD-10-CM

## 2023-11-20 DIAGNOSIS — I50.22 CHRONIC SYSTOLIC HEART FAILURE (HCC): ICD-10-CM

## 2023-11-20 PROCEDURE — 3074F SYST BP LT 130 MM HG: CPT | Performed by: INTERNAL MEDICINE

## 2023-11-20 PROCEDURE — 3078F DIAST BP <80 MM HG: CPT | Performed by: INTERNAL MEDICINE

## 2023-11-20 PROCEDURE — 1036F TOBACCO NON-USER: CPT | Performed by: INTERNAL MEDICINE

## 2023-11-20 PROCEDURE — G8484 FLU IMMUNIZE NO ADMIN: HCPCS | Performed by: INTERNAL MEDICINE

## 2023-11-20 PROCEDURE — 1123F ACP DISCUSS/DSCN MKR DOCD: CPT | Performed by: INTERNAL MEDICINE

## 2023-11-20 PROCEDURE — 99215 OFFICE O/P EST HI 40 MIN: CPT | Performed by: INTERNAL MEDICINE

## 2023-11-20 PROCEDURE — G8427 DOCREV CUR MEDS BY ELIG CLIN: HCPCS | Performed by: INTERNAL MEDICINE

## 2023-11-20 PROCEDURE — G8417 CALC BMI ABV UP PARAM F/U: HCPCS | Performed by: INTERNAL MEDICINE

## 2023-11-20 PROCEDURE — G8399 PT W/DXA RESULTS DOCUMENT: HCPCS | Performed by: INTERNAL MEDICINE

## 2023-11-20 PROCEDURE — 1090F PRES/ABSN URINE INCON ASSESS: CPT | Performed by: INTERNAL MEDICINE

## 2023-11-20 RX ORDER — LEVOTHYROXINE SODIUM 0.05 MG/1
50 TABLET ORAL DAILY
Qty: 90 TABLET | Refills: 3 | Status: SHIPPED | OUTPATIENT
Start: 2023-11-20

## 2023-11-20 RX ORDER — ROSUVASTATIN CALCIUM 5 MG/1
5 TABLET, COATED ORAL DAILY
Qty: 90 TABLET | Refills: 1 | Status: SHIPPED | OUTPATIENT
Start: 2023-11-20

## 2023-11-20 ASSESSMENT — ENCOUNTER SYMPTOMS
COUGH: 0
BLOOD IN STOOL: 0
WHEEZING: 0

## 2023-11-20 NOTE — PROGRESS NOTES
some healthier diet changes and re check in a few months. If statin is chosen, low dose will be chosen initially. She has no prior hx of pci or cad interventions. She is on chronic abx for chronic bacterial endocarditis and she has a pacemaker and mechanical valve. 11/20/23-->> again discussed treatment at length and she is open to trying a statin- she has had similar discussion with her cardiologist as well. Lab Results   Component Value Date    CHOL 221 (H) 11/15/2023    CHOL 229 (H) 07/14/2023    CHOL 209 (H) 07/28/2022     Lab Results   Component Value Date    TRIG 298 (H) 11/15/2023    TRIG 127 07/14/2023    TRIG 129 07/28/2022     Lab Results   Component Value Date    HDL 43 11/15/2023    HDL 49 07/14/2023    HDL 48 07/28/2022     Lab Results   Component Value Date    LDLCALC 118.4 (H) 11/15/2023    LDLCALC 154.6 (H) 07/14/2023    LDLCALC 135.2 (H) 07/28/2022     Lab Results   Component Value Date    LABVLDL 59.6 (H) 11/15/2023    LABVLDL 25.4 (H) 07/14/2023    LABVLDL 25.8 (H) 07/28/2022     Lab Results   Component Value Date    CHOLHDLRATIO 5.1 11/15/2023    CHOLHDLRATIO 4.7 07/14/2023    CHOLHDLRATIO 4.4 07/28/2022     Lab Results   Component Value Date    ALT 18 07/14/2023    AST 20 07/14/2023    ALKPHOS 87 07/14/2023    BILITOT 0.6 07/14/2023         Hypothyroidism - stable, well controlled with medication, no symptoms of dry skin, changes in weight, hair, skin, no worsening fatigue. Taking medications as prescribed. Lab Results   Component Value Date    TSH 1.920 09/26/2019    SOW8TEH 1.490 07/14/2023 11/6/22-Acute upper GI bleed November 2022: GI was consulted and she underwent emergent EGD. She had endoclips x2 placed in the distal antrum of the stomach. IR was also consulted and she underwent embolization of the gastrodueodenal artery. Her hemoglobin has stable ~8 since the procedure. She will be discharged on protonix and follow up with GI as an out patient.   She will be handed a

## 2023-11-20 NOTE — PATIENT INSTRUCTIONS
Start Crestor 5 mg daily. Will re check lipids and labs in 3 months. Benefit outweighs risk. Give it a good try once daily and let see if we can improve your lipids. Medication refilled    See orders.      Mele Mckenzie MD

## 2023-12-07 ENCOUNTER — PATIENT MESSAGE (OUTPATIENT)
Age: 78
End: 2023-12-07

## 2023-12-07 NOTE — PROGRESS NOTES
normal weight and in no respiratory distress, on RA. HEENT:   PERRL. Conjunctivae unremarkable. Nasal mucosa is without epistaxis, exudate, or polyps. Gums and dentition are unremarkable. There is moderate oropharyngeal narrowing. Mallampati III. NECK/LYMPHATIC:   Symmetrical with no elevation of jugular venous pulsation. Trachea midline. No thyroid enlargement. No cervical adenopathy. LUNGS:   Normal respiratory effort with symmetrical lung expansion. Breath sounds clear. HEART:   There is a regular rate and rhythm. No murmur, rub, or gallop. There is no edema in the lower extremities. ABDOMEN:   Soft and non-tender. No hepatosplenomegaly. Bowel sounds are normal.     NEURO:   The patient is alert and oriented to person, place, and time. Memory appears intact and mood is normal.  No gross sensorimotor deficits are present. ASSESSMENT:  (Medical Decision Making)      Diagnosis Orders   1. MARK ANTHONY (obstructive sleep apnea) -The pathophysiology of obstructive sleep apnea was reviewed with the patient. It's potential to promote severe neurologic, cardiac, pulmonary, and gastrointestinal problems was discussed. Specifically, the increased incidence of hypertension, coronary artery disease, congestive heart failure, pulmonary hypertension, gastroesophageal reflux, pathologic hypersomnolence, memory loss, and glucose intolerance was related to the consequences of hypoxemia, hypercapnia, airway obstruction, and sympathetic overdrive. We also discussed the ability of nasal CPAP to correct these abnormalities through maintenance of a patent airway. Pt ordered to have a home sleep study. We will then determine if she has sleep apnea that warrants treatment. If she has MARK ANTHONY, this is likely contributing to her insomnia. Ambulatory Referral to Sleep Studies      2. Insomnia, unspecified type -refer for HST, continue melatonin Ambulatory Referral to Sleep Studies      3.  Disrupted sleep-wake

## 2023-12-08 ENCOUNTER — OFFICE VISIT (OUTPATIENT)
Dept: SLEEP MEDICINE | Age: 78
End: 2023-12-08
Payer: MEDICARE

## 2023-12-08 ENCOUNTER — TELEPHONE (OUTPATIENT)
Age: 78
End: 2023-12-08

## 2023-12-08 VITALS
BODY MASS INDEX: 27.06 KG/M2 | HEIGHT: 70 IN | OXYGEN SATURATION: 100 % | SYSTOLIC BLOOD PRESSURE: 142 MMHG | RESPIRATION RATE: 14 BRPM | HEART RATE: 83 BPM | TEMPERATURE: 97.9 F | DIASTOLIC BLOOD PRESSURE: 84 MMHG | WEIGHT: 189 LBS

## 2023-12-08 DIAGNOSIS — F51.8 DISRUPTED SLEEP-WAKE CYCLE: ICD-10-CM

## 2023-12-08 DIAGNOSIS — G47.33 OSA (OBSTRUCTIVE SLEEP APNEA): Primary | ICD-10-CM

## 2023-12-08 DIAGNOSIS — G47.20 DISRUPTED SLEEP-WAKE CYCLE: ICD-10-CM

## 2023-12-08 DIAGNOSIS — G47.00 INSOMNIA, UNSPECIFIED TYPE: ICD-10-CM

## 2023-12-08 PROCEDURE — G8399 PT W/DXA RESULTS DOCUMENT: HCPCS | Performed by: PHYSICIAN ASSISTANT

## 2023-12-08 PROCEDURE — G8417 CALC BMI ABV UP PARAM F/U: HCPCS | Performed by: PHYSICIAN ASSISTANT

## 2023-12-08 PROCEDURE — 1090F PRES/ABSN URINE INCON ASSESS: CPT | Performed by: PHYSICIAN ASSISTANT

## 2023-12-08 PROCEDURE — 1123F ACP DISCUSS/DSCN MKR DOCD: CPT | Performed by: PHYSICIAN ASSISTANT

## 2023-12-08 PROCEDURE — G8484 FLU IMMUNIZE NO ADMIN: HCPCS | Performed by: PHYSICIAN ASSISTANT

## 2023-12-08 PROCEDURE — 99203 OFFICE O/P NEW LOW 30 MIN: CPT | Performed by: PHYSICIAN ASSISTANT

## 2023-12-08 PROCEDURE — 3079F DIAST BP 80-89 MM HG: CPT | Performed by: PHYSICIAN ASSISTANT

## 2023-12-08 PROCEDURE — 1036F TOBACCO NON-USER: CPT | Performed by: PHYSICIAN ASSISTANT

## 2023-12-08 PROCEDURE — 3077F SYST BP >= 140 MM HG: CPT | Performed by: PHYSICIAN ASSISTANT

## 2023-12-08 PROCEDURE — G8427 DOCREV CUR MEDS BY ELIG CLIN: HCPCS | Performed by: PHYSICIAN ASSISTANT

## 2023-12-08 ASSESSMENT — SLEEP AND FATIGUE QUESTIONNAIRES
HOW LIKELY ARE YOU TO NOD OFF OR FALL ASLEEP WHILE SITTING AND TALKING TO SOMEONE: 0
HOW LIKELY ARE YOU TO NOD OFF OR FALL ASLEEP WHILE SITTING INACTIVE IN A PUBLIC PLACE: 1
HOW LIKELY ARE YOU TO NOD OFF OR FALL ASLEEP WHILE SITTING QUIETLY AFTER LUNCH WITHOUT ALCOHOL: 1
ESS TOTAL SCORE: 9
HOW LIKELY ARE YOU TO NOD OFF OR FALL ASLEEP WHILE LYING DOWN TO REST IN THE AFTERNOON WHEN CIRCUMSTANCES PERMIT: 2
HOW LIKELY ARE YOU TO NOD OFF OR FALL ASLEEP IN A CAR, WHILE STOPPED FOR A FEW MINUTES IN TRAFFIC: 0
HOW LIKELY ARE YOU TO NOD OFF OR FALL ASLEEP WHEN YOU ARE A PASSENGER IN A CAR FOR AN HOUR WITHOUT A BREAK: 1
HOW LIKELY ARE YOU TO NOD OFF OR FALL ASLEEP WHILE WATCHING TV: 2
HOW LIKELY ARE YOU TO NOD OFF OR FALL ASLEEP WHILE SITTING AND READING: 2

## 2023-12-08 NOTE — TELEPHONE ENCOUNTER
----- Message from LeonaTamiko Sharma sent at 12/8/2023  8:28 AM EST -----  Regarding: FW: Congestion/Phlegm   Contact: [phone]    ----- Message -----  From: Cyndie Ovalles  Sent: 12/7/2023  10:34 PM EST  To: [facility] Clinical Staff  Subject: Congestion/Phlegm                                Good Evening,     I wanted to report that I have concerns about [address] having chest congestion and concern that it may be RSV. With her heart issues I am concerned it could be stressing her heart. Does she need to be tested for RSV? What should she doing to get the phlegm up from her lungs?      Thank you,   Elle Luo

## 2023-12-08 NOTE — PATIENT INSTRUCTIONS
You will get a call from [employer] to schedule your at-home sleep study in the next 24-48 hours. This device will be mailed to you. It is a 2 night sleep study and once completed, you will return device to address provided. A physician will read the study and you will receive a call from our office with results or to schedule a follow-up appointment with the Sleep Center to discuss treatment options.

## 2023-12-08 NOTE — TELEPHONE ENCOUNTER
Pt's dtr, Uli Henderson states pt has lot of phlegm, congestion. Tried Albuterol inhaler and sx worsened. She states doesn't know sx of RSV but concerned about congestion, phlegm. Pt is being seen this AM at [facility] for initial Consult. Advise if pt at Pulmonary someone will likely listen to pt's lungs, eval.   Advise PCP or Urgent Care for any worsening sx. Uli Henderson voiced understanding and agreement with POC.   cgh

## 2023-12-12 ENCOUNTER — OFFICE VISIT (OUTPATIENT)
Dept: INTERNAL MEDICINE CLINIC | Facility: CLINIC | Age: 78
End: 2023-12-12
Payer: MEDICARE

## 2023-12-12 VITALS
HEIGHT: 70 IN | SYSTOLIC BLOOD PRESSURE: 120 MMHG | RESPIRATION RATE: 16 BRPM | TEMPERATURE: 98.2 F | DIASTOLIC BLOOD PRESSURE: 80 MMHG | WEIGHT: 187.6 LBS | OXYGEN SATURATION: 98 % | BODY MASS INDEX: 26.86 KG/M2 | HEART RATE: 87 BPM

## 2023-12-12 DIAGNOSIS — B33.8 RSV INFECTION: ICD-10-CM

## 2023-12-12 DIAGNOSIS — U07.1 LAB TEST POSITIVE FOR DETECTION OF COVID-19 VIRUS: ICD-10-CM

## 2023-12-12 DIAGNOSIS — R05.9 COUGH, UNSPECIFIED TYPE: Primary | ICD-10-CM

## 2023-12-12 LAB
EXP DATE SOLUTION: NORMAL
EXP DATE SWAB: NORMAL
EXPIRATION DATE: NORMAL
INFLUENZA A ANTIGEN, POC: NEGATIVE
INFLUENZA B ANTIGEN, POC: NEGATIVE
LOT NUMBER POC: NORMAL
LOT NUMBER SOLUTION: NORMAL
LOT NUMBER SWAB: NORMAL
SARS-COV-2 RNA, POC: POSITIVE
VALID INTERNAL CONTROL, POC: YES

## 2023-12-12 PROCEDURE — G8484 FLU IMMUNIZE NO ADMIN: HCPCS | Performed by: INTERNAL MEDICINE

## 2023-12-12 PROCEDURE — 1090F PRES/ABSN URINE INCON ASSESS: CPT | Performed by: INTERNAL MEDICINE

## 2023-12-12 PROCEDURE — 1123F ACP DISCUSS/DSCN MKR DOCD: CPT | Performed by: INTERNAL MEDICINE

## 2023-12-12 PROCEDURE — 87804 INFLUENZA ASSAY W/OPTIC: CPT | Performed by: INTERNAL MEDICINE

## 2023-12-12 PROCEDURE — 3074F SYST BP LT 130 MM HG: CPT | Performed by: INTERNAL MEDICINE

## 2023-12-12 PROCEDURE — 99213 OFFICE O/P EST LOW 20 MIN: CPT | Performed by: INTERNAL MEDICINE

## 2023-12-12 PROCEDURE — G8417 CALC BMI ABV UP PARAM F/U: HCPCS | Performed by: INTERNAL MEDICINE

## 2023-12-12 PROCEDURE — 87635 SARS-COV-2 COVID-19 AMP PRB: CPT | Performed by: INTERNAL MEDICINE

## 2023-12-12 PROCEDURE — G8399 PT W/DXA RESULTS DOCUMENT: HCPCS | Performed by: INTERNAL MEDICINE

## 2023-12-12 PROCEDURE — 1036F TOBACCO NON-USER: CPT | Performed by: INTERNAL MEDICINE

## 2023-12-12 PROCEDURE — G8427 DOCREV CUR MEDS BY ELIG CLIN: HCPCS | Performed by: INTERNAL MEDICINE

## 2023-12-12 PROCEDURE — 3079F DIAST BP 80-89 MM HG: CPT | Performed by: INTERNAL MEDICINE

## 2023-12-12 RX ORDER — BENZONATATE 200 MG/1
200 CAPSULE ORAL 3 TIMES DAILY PRN
Qty: 30 CAPSULE | Refills: 0 | Status: SHIPPED | OUTPATIENT
Start: 2023-12-12 | End: 2023-12-19

## 2023-12-12 RX ORDER — PREDNISONE 20 MG/1
TABLET ORAL
COMMUNITY
Start: 2023-12-08

## 2023-12-12 ASSESSMENT — ENCOUNTER SYMPTOMS
SHORTNESS OF BREATH: 0
SINUS PRESSURE: 0
NAUSEA: 0
VOMITING: 0
RHINORRHEA: 0
COUGH: 1
ABDOMINAL PAIN: 0
BLOOD IN STOOL: 0
WHEEZING: 1
CHEST TIGHTNESS: 0
DIARRHEA: 0

## 2023-12-12 NOTE — PROGRESS NOTES
[facility]      2023    Patient Name: Oly Cote  :  1945    Subjective:    Chief Complaint:  Chief Complaint   Patient presents with    Cough     Pt c/o cough w/ purple phlegm. Pt says she was seen at [facility] and tested positive for RSV, but wa only given an albuterol inhaler. HPI c/o 5-6 day hx of cough, congestion, SOB; she started using Albuterol inhaler, has noticed cough becoming progressively worse; she tested positive for RSV 5 days ago, was prescribed Albuterol; she denies fever, has had chills; she denies N/V/D, but has had decreased appetite and has lost some weight in the past week;      Past Medical History:   Diagnosis Date    Abnormal Pap smear of cervix     [facility] procedure in her 25s    Arrhythmia     Arthritis     Bacteremia     Cardiac pacemaker 7/15/2016    Chronic atrial fibrillation ([address]) 2016    Chronic renal failure     Chronic systolic heart failure ([address])     Dyslipidemia     Endocarditis     Heart failure ([address])     History of aortic valve replacement with bioprosthetic valve 7/15/2016    History of prosthetic heart valve 2010    Bovine aortic valve    Hx of bacterial endocarditis     Hypertension     Mechanical heart valve present     mechanical mitral valve    Menopause     AGE 51    Pacemaker 2010    Permanent atrial fibrillation ([address])     Rheumatic fever     Sleep apnea     Tricuspid valve disorder        Past Surgical History:   Procedure Laterality Date    AORTIC VALVE REPLACEMENT      [facility]    IR EMBOLIZATION HEMORRHAGE  2022    IR EMBOLIZATION HEMORRHAGE 2022 [facility]    MITRAL VALVE REPLACEMENT      [facility]    OTHER SURGICAL HISTORY      s/p annuloplasty ring by pt report    PACEMAKER      MD UNLISTED PROCEDURE CARDIAC SURGERY      TONSILLECTOMY      UPPER GASTROINTESTINAL ENDOSCOPY N/A 2022    EGD CONTROL HEMORRHAGE/clipping performed by Rocael Taylor MD at [facility] ENDOSCOPY

## 2023-12-13 ENCOUNTER — TELEPHONE (OUTPATIENT)
Age: 78
End: 2023-12-13
Payer: MEDICARE

## 2023-12-13 ENCOUNTER — PATIENT MESSAGE (OUTPATIENT)
Age: 78
End: 2023-12-13

## 2023-12-13 DIAGNOSIS — Z95.2 HISTORY OF MITRAL VALVE REPLACEMENT WITH MECHANICAL VALVE: ICD-10-CM

## 2023-12-13 DIAGNOSIS — Z79.01 LONG TERM (CURRENT) USE OF ANTICOAGULANTS: ICD-10-CM

## 2023-12-13 DIAGNOSIS — I48.21 PERMANENT ATRIAL FIBRILLATION (HCC): ICD-10-CM

## 2023-12-13 DIAGNOSIS — Z95.3 HISTORY OF AORTIC VALVE REPLACEMENT WITH BIOPROSTHETIC VALVE: ICD-10-CM

## 2023-12-13 DIAGNOSIS — U07.1 COVID: Primary | ICD-10-CM

## 2023-12-13 PROCEDURE — 99443 PR PHYS/QHP TELEPHONE EVALUATION 21-30 MIN: CPT | Performed by: INTERNAL MEDICINE

## 2023-12-13 NOTE — TELEPHONE ENCOUNTER
Informed pt's dtr, Haydeeorlando Tahir of Dr. Beatriz Aguilera' response. Kimberlee Haro voiced understanding. States just read response. She asks when to go to ER? Advise />  BP <90/50, 190/100/>  O2 Sats <90%. Worsening sx. Kimberlee Haro voiced understanding. Asks for recs to be sent via [address] [place] [address]. Asks for recs for PCP. Confirmed will send. Advise return call for symptoms. MyChart not always checked daily, evening or weekends. MD on call 24/7. Kimberlee Haro voiced understanding.   cgh

## 2023-12-13 NOTE — TELEPHONE ENCOUNTER
----- Message from Tamiko Arango sent at 12/13/2023  1:51 PM EST -----  Regarding: FW: Congestion/Phlegm   Contact: [phone]    ----- Message -----  From: Delores Kingston MA  Sent: 12/13/2023  10:07 AM EST  To: Brandon Costello MA  Subject: FW: Congestion/Phlegm                              ----- Message -----  From: Betty Dong  Sent: 12/13/2023   9:38 AM EST  To: [facility]iology Clinical Staff  Subject: Congestion/Phlegm                                Good Morning, My mom has now been diagnosed with RSV and COVID. She is still having difficulties with having phlegm she can't get up and was able to get into her doctors office. She wasn't able to see her actual dr. but did see a PA there. She did not get the care that she should have received from the PA and it was as if my mother was having to be the actual dr.  Gerald Domínguez were trying to prescribe her medication she isn't able to have and that is flagged in her chart and then prescribe her medication that she already had from her urgent care visit (inhaler and steroids) on Friday. The PA then said there is an experimental drug Molnupiravir she could prescribe. Due to the lack of care my mom received yesterday she said she wanted to check with her cardiologist if he would recommend her taking it. I am very concern the stress this is having on my mom's heart . She has now started coughing up a very small amount of blood when she can finally cough up the phlegm. She had a bleed last   November and I am not sure if this could be a sign or if it is just from so much coughing. The doctor didn't order an xray to look at her lungs after my mom asking if one was needed. We have been to her primary and urgent care. Is there anything she can take to get relief? Does she need an X-Ray of her lungs? Blood work to make sure she doesn't have a small bleed? Or any other type of medicine she can take? Thoughts on Molnupiravir?    Thank you

## 2023-12-13 NOTE — TELEPHONE ENCOUNTER
Pt has RSV and Covid. Has seen PCP and Urgent Care. Asks if ok from cardiac standpoint to take Molnupiravir? Coughing up small amount of blood with phlegm. Hx of GI bleed last fall. On Warfarin. Will advise f/u with PCP re cxr and other pulmonary concerns noted below.  cgh

## 2023-12-13 NOTE — TELEPHONE ENCOUNTER
Patient initiated outside phone call received. Time spent reviewing chart, old records, formulating a plan, and responding is greater than 15 minutes. The patient's most recent encounter has been reviewed. Medications and appropriate lab work have been reviewed. Appropriate imaging studies were reviewed. Formulated plan and response:  Sorry to hear about your mom. Unfortunately both Paxlovid and molnupiravir cause an increase in INR after several days of therapy that could potentially create more bleeding issues and coughing up more blood. If she is stable or improving, I would avoid Paxlovid or molnupiravir if at all possible. If she keeps getting worse she needs to come to [facility] and be evaluated by the pulmonary/critical care doctors. I would be hesitant to start any antiviral for fear of increasing her INR. She needs the INR to be therapeutic but not supratherapeutic especially in light of her previous valve issues. This is a difficult situation. Please keep us informed as to how she is doing.     Nia Bey MD

## 2023-12-13 NOTE — TELEPHONE ENCOUNTER
Sorry to hear about your mom. Unfortunately both Paxlovid and molnupiravir cause an increase in INR after several days of therapy that could potentially create more bleeding issues and coughing up more blood. If she is stable or improving, I would avoid Paxlovid or molnupiravir if at all possible. If she keeps getting worse she needs to come to [facility] and be evaluated by the pulmonary/critical care doctors. I would be hesitant to start any antiviral for fear of increasing her INR. She needs the INR to be therapeutic but not supratherapeutic especially in light of her previous valve issues. This is a difficult situation. Please keep us informed as to how she is doing.

## 2023-12-13 NOTE — TELEPHONE ENCOUNTER
Sent Via [employer]:  Primary Care Drs:  [facility]  Dr. Brenda Romero [facility]   [phone]    [facility]  [phone]    Visit [employer] or call   [phone].   cgh

## 2023-12-28 ENCOUNTER — ANTI-COAG VISIT (OUTPATIENT)
Age: 78
End: 2023-12-28
Payer: MEDICARE

## 2023-12-28 DIAGNOSIS — Z95.3 HISTORY OF AORTIC VALVE REPLACEMENT WITH BIOPROSTHETIC VALVE: ICD-10-CM

## 2023-12-28 DIAGNOSIS — Z79.01 LONG TERM (CURRENT) USE OF ANTICOAGULANTS: ICD-10-CM

## 2023-12-28 DIAGNOSIS — Z95.2 HISTORY OF MITRAL VALVE REPLACEMENT WITH MECHANICAL VALVE: Primary | ICD-10-CM

## 2023-12-28 DIAGNOSIS — I48.21 PERMANENT ATRIAL FIBRILLATION (HCC): ICD-10-CM

## 2023-12-28 LAB
POC INR: 5
PROTHROMBIN TIME, POC: NORMAL

## 2023-12-28 PROCEDURE — 93793 ANTICOAG MGMT PT WARFARIN: CPT | Performed by: INTERNAL MEDICINE

## 2023-12-28 PROCEDURE — 85610 PROTHROMBIN TIME: CPT | Performed by: INTERNAL MEDICINE

## 2024-01-03 ENCOUNTER — ANTI-COAG VISIT (OUTPATIENT)
Age: 79
End: 2024-01-03
Payer: MEDICARE

## 2024-01-03 DIAGNOSIS — Z79.01 LONG TERM (CURRENT) USE OF ANTICOAGULANTS: ICD-10-CM

## 2024-01-03 DIAGNOSIS — Z95.3 HISTORY OF AORTIC VALVE REPLACEMENT WITH BIOPROSTHETIC VALVE: ICD-10-CM

## 2024-01-03 DIAGNOSIS — Z95.2 HISTORY OF MITRAL VALVE REPLACEMENT WITH MECHANICAL VALVE: Primary | ICD-10-CM

## 2024-01-03 DIAGNOSIS — I48.21 PERMANENT ATRIAL FIBRILLATION (HCC): ICD-10-CM

## 2024-01-03 LAB
POC INR: 2.4
PROTHROMBIN TIME, POC: NORMAL

## 2024-01-03 PROCEDURE — 93793 ANTICOAG MGMT PT WARFARIN: CPT | Performed by: INTERNAL MEDICINE

## 2024-01-03 PROCEDURE — 85610 PROTHROMBIN TIME: CPT | Performed by: INTERNAL MEDICINE

## 2024-01-11 ENCOUNTER — ANTI-COAG VISIT (OUTPATIENT)
Age: 79
End: 2024-01-11
Payer: MEDICARE

## 2024-01-11 DIAGNOSIS — Z95.2 HISTORY OF MITRAL VALVE REPLACEMENT WITH MECHANICAL VALVE: Primary | ICD-10-CM

## 2024-01-11 DIAGNOSIS — Z95.3 HISTORY OF AORTIC VALVE REPLACEMENT WITH BIOPROSTHETIC VALVE: ICD-10-CM

## 2024-01-11 DIAGNOSIS — I48.21 PERMANENT ATRIAL FIBRILLATION (HCC): ICD-10-CM

## 2024-01-11 DIAGNOSIS — Z79.01 LONG TERM (CURRENT) USE OF ANTICOAGULANTS: ICD-10-CM

## 2024-01-11 LAB
POC INR: 3.8
PROTHROMBIN TIME, POC: ABNORMAL

## 2024-01-11 PROCEDURE — 85610 PROTHROMBIN TIME: CPT | Performed by: INTERNAL MEDICINE

## 2024-01-11 NOTE — PROGRESS NOTES
Warfarin tablet strength and weekly dosing schedule confirmed today. Continue current maintenance plan (see Anticoag Dosing Calendar). INR to be rechecked in two week(s).

## 2024-01-11 NOTE — PATIENT INSTRUCTIONS
Reminder: Please contact the Coumadin Clinic at [phone] when you have medication changes. Examples, new medications, antibiotics, discontinued medications, new supplements, missed doses of warfarin or if you took extra doses of warfarin.  This also includes OTC medications. Notifying us helps reduce the possibility of high and low INR's. In addition, if warfarin needs to be held for any procedures, please have surgeon or physician's office contact us before holding anticoagulant. Thanks, [facility] Coumadin Clinic.

## 2024-01-25 ENCOUNTER — ANTI-COAG VISIT (OUTPATIENT)
Age: 79
End: 2024-01-25

## 2024-01-25 DIAGNOSIS — Z95.2 HISTORY OF MITRAL VALVE REPLACEMENT WITH MECHANICAL VALVE: Primary | ICD-10-CM

## 2024-01-25 DIAGNOSIS — Z79.01 LONG TERM (CURRENT) USE OF ANTICOAGULANTS: ICD-10-CM

## 2024-01-25 DIAGNOSIS — Z95.3 HISTORY OF AORTIC VALVE REPLACEMENT WITH BIOPROSTHETIC VALVE: ICD-10-CM

## 2024-01-25 DIAGNOSIS — I48.21 PERMANENT ATRIAL FIBRILLATION (HCC): ICD-10-CM

## 2024-01-25 LAB
POC INR: 4
PROTHROMBIN TIME, POC: ABNORMAL

## 2024-01-25 NOTE — PROGRESS NOTES
Warfarin tablet strength and weekly dosing schedule confirmed today. Maintenance plan decreased by 12.5 %, (see Anticoag Dosing Calendar). INR to be rechecked in two weeks.

## 2024-01-25 NOTE — PATIENT INSTRUCTIONS
Reminder: Please contact the Coumadin Clinic at [phone] when you have medication changes. Examples, new medications, antibiotics, discontinued medications, new supplements, missed doses of warfarin or if you took extra doses of warfarin.  This also includes OTC medications. Notifying us helps reduce the possibility of high and low INR's. In addition, if warfarin needs to be held for any procedures, please have surgeon or physician's office contact us before holding anticoagulant. Thanks, [facility] Cardiology Coumadin Clinic.

## 2024-02-08 ENCOUNTER — ANTI-COAG VISIT (OUTPATIENT)
Age: 79
End: 2024-02-08

## 2024-02-08 DIAGNOSIS — Z79.01 LONG TERM (CURRENT) USE OF ANTICOAGULANTS: ICD-10-CM

## 2024-02-08 DIAGNOSIS — I48.21 PERMANENT ATRIAL FIBRILLATION (HCC): ICD-10-CM

## 2024-02-08 DIAGNOSIS — Z95.2 HISTORY OF MITRAL VALVE REPLACEMENT WITH MECHANICAL VALVE: Primary | ICD-10-CM

## 2024-02-08 DIAGNOSIS — Z95.3 HISTORY OF AORTIC VALVE REPLACEMENT WITH BIOPROSTHETIC VALVE: ICD-10-CM

## 2024-02-08 LAB
POC INR: 3
PROTHROMBIN TIME, POC: NORMAL

## 2024-02-08 NOTE — PATIENT INSTRUCTIONS
Reminder: Please contact the Coumadin Clinic at [phone] when you have medication changes. Examples, new medications, antibiotics, discontinued medications, new supplements, missed doses of warfarin or if you took extra doses of warfarin.  This also includes OTC medications. Notifying us helps reduce the possibility of high and low INR's. In addition, if warfarin needs to be held for any procedures, please have surgeon or physician's office contact us before holding anticoagulant. Thanks, [facility] Cardiology Coumadin Clinic.

## 2024-03-04 DIAGNOSIS — E78.5 DYSLIPIDEMIA: ICD-10-CM

## 2024-03-04 DIAGNOSIS — I48.21 PERMANENT ATRIAL FIBRILLATION (HCC): ICD-10-CM

## 2024-03-04 DIAGNOSIS — E03.9 ACQUIRED HYPOTHYROIDISM: ICD-10-CM

## 2024-03-04 DIAGNOSIS — I48.20 CHRONIC A-FIB (HCC): ICD-10-CM

## 2024-03-04 DIAGNOSIS — Z95.3 HISTORY OF AORTIC VALVE REPLACEMENT WITH BIOPROSTHETIC VALVE: ICD-10-CM

## 2024-03-04 DIAGNOSIS — I50.22 CHRONIC SYSTOLIC HEART FAILURE (HCC): ICD-10-CM

## 2024-03-04 DIAGNOSIS — Z95.2 HISTORY OF MITRAL VALVE REPLACEMENT WITH MECHANICAL VALVE: ICD-10-CM

## 2024-03-04 DIAGNOSIS — I33.0 CHRONIC BACTERIAL ENDOCARDITIS: Chronic | ICD-10-CM

## 2024-03-04 LAB
ALT SERPL-CCNC: 14 U/L (ref 12–65)
ANION GAP SERPL CALC-SCNC: 4 MMOL/L (ref 2–11)
AST SERPL-CCNC: 15 U/L (ref 15–37)
BUN SERPL-MCNC: 25 MG/DL (ref 8–23)
CALCIUM SERPL-MCNC: 9.6 MG/DL (ref 8.3–10.4)
CHLORIDE SERPL-SCNC: 112 MMOL/L (ref 103–113)
CHOLEST SERPL-MCNC: 144 MG/DL
CO2 SERPL-SCNC: 29 MMOL/L (ref 21–32)
CREAT SERPL-MCNC: 0.9 MG/DL (ref 0.6–1)
CRP SERPL-MCNC: 0.4 MG/DL (ref 0–0.9)
ERYTHROCYTE [DISTWIDTH] IN BLOOD BY AUTOMATED COUNT: 12.9 % (ref 11.9–14.6)
ERYTHROCYTE [SEDIMENTATION RATE] IN BLOOD: 2 MM/HR (ref 0–30)
GLUCOSE SERPL-MCNC: 94 MG/DL (ref 65–100)
HCT VFR BLD AUTO: 34.3 % (ref 35.8–46.3)
HDLC SERPL-MCNC: 60 MG/DL (ref 40–60)
HDLC SERPL: 2.4
HGB BLD-MCNC: 11 G/DL (ref 11.7–15.4)
LDLC SERPL CALC-MCNC: 67 MG/DL
MCH RBC QN AUTO: 30.2 PG (ref 26.1–32.9)
MCHC RBC AUTO-ENTMCNC: 32.1 G/DL (ref 31.4–35)
MCV RBC AUTO: 94.2 FL (ref 82–102)
NRBC # BLD: 0 K/UL (ref 0–0.2)
PLATELET # BLD AUTO: 172 K/UL (ref 150–450)
PMV BLD AUTO: 11.1 FL (ref 9.4–12.3)
POTASSIUM SERPL-SCNC: 4.1 MMOL/L (ref 3.5–5.1)
RBC # BLD AUTO: 3.64 M/UL (ref 4.05–5.2)
SODIUM SERPL-SCNC: 145 MMOL/L (ref 136–146)
TRIGL SERPL-MCNC: 85 MG/DL (ref 35–150)
TSH, 3RD GENERATION: 1.44 UIU/ML (ref 0.36–3.74)
VLDLC SERPL CALC-MCNC: 17 MG/DL (ref 6–23)
WBC # BLD AUTO: 4.8 K/UL (ref 4.3–11.1)

## 2024-03-07 ENCOUNTER — ANTI-COAG VISIT (OUTPATIENT)
Age: 79
End: 2024-03-07
Payer: MEDICARE

## 2024-03-07 DIAGNOSIS — Z79.01 LONG TERM (CURRENT) USE OF ANTICOAGULANTS: ICD-10-CM

## 2024-03-07 DIAGNOSIS — Z95.2 HISTORY OF MITRAL VALVE REPLACEMENT WITH MECHANICAL VALVE: Primary | ICD-10-CM

## 2024-03-07 DIAGNOSIS — I48.21 PERMANENT ATRIAL FIBRILLATION (HCC): ICD-10-CM

## 2024-03-07 DIAGNOSIS — Z95.3 HISTORY OF AORTIC VALVE REPLACEMENT WITH BIOPROSTHETIC VALVE: ICD-10-CM

## 2024-03-07 LAB
POC INR: 2.8
PROTHROMBIN TIME, POC: NORMAL

## 2024-03-07 PROCEDURE — 85610 PROTHROMBIN TIME: CPT | Performed by: INTERNAL MEDICINE

## 2024-03-07 PROCEDURE — 93793 ANTICOAG MGMT PT WARFARIN: CPT | Performed by: INTERNAL MEDICINE

## 2024-03-07 NOTE — PATIENT INSTRUCTIONS
Reminder: Please contact the Coumadin Clinic at [phone] when you have medication changes. Examples, new medications, antibiotics, discontinued medications, new supplements, missed doses of warfarin or if you took extra doses of warfarin.  This also includes OTC medications. Notifying us helps reduce the possibility of high and low INR's. In addition, if warfarin needs to be held for any procedures, please have surgeon or physician's office contact us before holding anticoagulant. Thanks, [facility] Cardiology Coumadin Clinic.

## 2024-03-07 NOTE — PROGRESS NOTES
Warfarin tablet strength and weekly dosing schedule confirmed today.Continue current maintenance plan (see Anticoag Dosing Calendar). INR to be rechecked in four week(s).

## 2024-03-11 ENCOUNTER — HOSPITAL ENCOUNTER (OUTPATIENT)
Dept: MAMMOGRAPHY | Age: 79
Discharge: HOME | End: 2024-03-14
Attending: INTERNAL MEDICINE
Payer: MEDICARE

## 2024-03-11 VITALS — WEIGHT: 187 LBS | BODY MASS INDEX: 26.77 KG/M2 | HEIGHT: 70 IN

## 2024-03-11 DIAGNOSIS — Z12.31 VISIT FOR SCREENING MAMMOGRAM: ICD-10-CM

## 2024-03-11 PROCEDURE — 77063 BREAST TOMOSYNTHESIS BI: CPT

## 2024-03-12 ENCOUNTER — OFFICE VISIT (OUTPATIENT)
Dept: INTERNAL MEDICINE CLINIC | Facility: CLINIC | Age: 79
End: 2024-03-12
Payer: MEDICARE

## 2024-03-12 VITALS
OXYGEN SATURATION: 98 % | BODY MASS INDEX: 26.34 KG/M2 | HEIGHT: 70 IN | SYSTOLIC BLOOD PRESSURE: 126 MMHG | HEART RATE: 80 BPM | WEIGHT: 184 LBS | TEMPERATURE: 97.3 F | DIASTOLIC BLOOD PRESSURE: 68 MMHG

## 2024-03-12 DIAGNOSIS — D68.69 HYPERCOAGULABLE STATE, SECONDARY (HCC): ICD-10-CM

## 2024-03-12 DIAGNOSIS — I48.20 CHRONIC A-FIB (HCC): ICD-10-CM

## 2024-03-12 DIAGNOSIS — I50.22 CHRONIC SYSTOLIC HEART FAILURE (HCC): ICD-10-CM

## 2024-03-12 DIAGNOSIS — E03.9 ACQUIRED HYPOTHYROIDISM: ICD-10-CM

## 2024-03-12 DIAGNOSIS — E78.5 DYSLIPIDEMIA: ICD-10-CM

## 2024-03-12 DIAGNOSIS — Z79.01 WARFARIN ANTICOAGULATION: Primary | ICD-10-CM

## 2024-03-12 PROCEDURE — G8484 FLU IMMUNIZE NO ADMIN: HCPCS | Performed by: INTERNAL MEDICINE

## 2024-03-12 PROCEDURE — G8417 CALC BMI ABV UP PARAM F/U: HCPCS | Performed by: INTERNAL MEDICINE

## 2024-03-12 PROCEDURE — 99214 OFFICE O/P EST MOD 30 MIN: CPT | Performed by: INTERNAL MEDICINE

## 2024-03-12 PROCEDURE — 3078F DIAST BP <80 MM HG: CPT | Performed by: INTERNAL MEDICINE

## 2024-03-12 PROCEDURE — 1123F ACP DISCUSS/DSCN MKR DOCD: CPT | Performed by: INTERNAL MEDICINE

## 2024-03-12 PROCEDURE — 1090F PRES/ABSN URINE INCON ASSESS: CPT | Performed by: INTERNAL MEDICINE

## 2024-03-12 PROCEDURE — 3074F SYST BP LT 130 MM HG: CPT | Performed by: INTERNAL MEDICINE

## 2024-03-12 PROCEDURE — G8427 DOCREV CUR MEDS BY ELIG CLIN: HCPCS | Performed by: INTERNAL MEDICINE

## 2024-03-12 PROCEDURE — G8399 PT W/DXA RESULTS DOCUMENT: HCPCS | Performed by: INTERNAL MEDICINE

## 2024-03-12 PROCEDURE — 1036F TOBACCO NON-USER: CPT | Performed by: INTERNAL MEDICINE

## 2024-03-12 RX ORDER — LEVOTHYROXINE SODIUM 0.05 MG/1
50 TABLET ORAL DAILY
Qty: 90 TABLET | Refills: 3 | Status: SHIPPED | OUTPATIENT
Start: 2024-03-12

## 2024-03-12 RX ORDER — ROSUVASTATIN CALCIUM 5 MG/1
5 TABLET, COATED ORAL DAILY
Qty: 90 TABLET | Refills: 3 | Status: SHIPPED | OUTPATIENT
Start: 2024-03-12

## 2024-03-12 ASSESSMENT — ENCOUNTER SYMPTOMS
COUGH: 0
SHORTNESS OF BREATH: 0

## 2024-03-12 NOTE — ASSESSMENT & PLAN NOTE
Monitored by specialist- no acute findings meriting change in the plan  Lab Results   Component Value Date    LVEF 58 11/10/2022

## 2024-03-12 NOTE — PATIENT INSTRUCTIONS
The medication list included in this document is our record of what you are currently taking, including any changes that were made at today's visit.  If you find any differences when compared to your medications at home, or have any questions that were not answered at your visit, please contact the office.    Follow up in six months with labs prior.  See orders.     Delbert Dorsey MD

## 2024-03-12 NOTE — PROGRESS NOTES
POC INR 3.0    Results for orders placed or performed in visit on 01/25/24 (from the past 2160 hour(s))   PT/INR (Resulted at [facility]/in-office AND billed by [facility])   Result Value Ref Range    Prothrombin time, POC      POC INR 4.0    Results for orders placed or performed in visit on 01/11/24 (from the past 2160 hour(s))   PT/INR (Resulted at [facility]/in-office AND billed by [facility])   Result Value Ref Range    Prothrombin time, POC      POC INR 3.8    Results for orders placed or performed in visit on 01/03/24 (from the past 2160 hour(s))   PT/INR (Resulted at [facility]/in-office AND billed by [facility])   Result Value Ref Range    Prothrombin time, POC      POC INR 2.4    Results for orders placed or performed in visit on 12/28/23 (from the past 2160 hour(s))   PT/INR (Resulted at [facility]/in-office AND billed by [facility])   Result Value Ref Range    Prothrombin time, POC      POC INR 5.0        Vitals:    03/12/24 1425   BP: 126/68   Site: Left Upper Arm   Position: Sitting   Cuff Size: Small Adult   Pulse: 80   Temp: 97.3 °F (36.3 °C)   TempSrc: Temporal   SpO2: 98%   Weight: 83.5 kg (184 lb)   Height: 1.778 m (5' 10\")     Wt Readings from Last 3 Encounters:   03/12/24 83.5 kg (184 lb)   03/11/24 84.8 kg (187 lb)   12/12/23 85.1 kg (187 lb 9.6 oz)     BP Readings from Last 3 Encounters:   03/12/24 126/68   12/12/23 120/80   12/08/23 (!) 142/84     Physical Exam  Vitals and nursing note reviewed.   Constitutional:       Appearance: Normal appearance. She is not ill-appearing.   HENT:      Head: Normocephalic and atraumatic.   Eyes:      Extraocular Movements: Extraocular movements intact.      Conjunctiva/sclera: Conjunctivae normal.   Cardiovascular:      Rate and Rhythm: Normal rate and regular rhythm.      Comments: Valve click  Pulmonary:      Effort: Pulmonary effort is normal.      Breath sounds: Normal breath sounds. No wheezing or rhonchi.   Abdominal:      General: Bowel sounds are normal.      Palpations: Abdomen is soft.

## 2024-03-12 NOTE — ASSESSMENT & PLAN NOTE
Well-controlled, continue current medications  Much improved on Crestor 5 mg daily.  At goal.    Treatment regimen is effective.     Lab Results   Component Value Date    CHOL 144 03/04/2024    TRIG 85 03/04/2024    HDL 60 03/04/2024    LDLCALC 67 03/04/2024    CHOLHDLRATIO 2.4 03/04/2024     Lab Results   Component Value Date    ALT 14 03/04/2024    AST 15 03/04/2024    ALKPHOS 87 07/14/2023    BILITOT 0.6 07/14/2023     Key Hyperlipidemia Meds            rosuvastatin (CRESTOR) 5 MG tablet (Taking)    Sig - Route: Take 1 tablet by mouth daily - Oral

## 2024-03-29 ENCOUNTER — TELEPHONE (OUTPATIENT)
Dept: INTERNAL MEDICINE CLINIC | Facility: CLINIC | Age: 79
End: 2024-03-29

## 2024-03-29 NOTE — TELEPHONE ENCOUNTER
----- Message from Jacy Barahona sent at 3/26/2024  2:49 PM EDT -----  Subject: Message to Provider    QUESTIONS  Information for Provider? Patient returned Huong العراقي. tried to WT no   answer. Please contact patient  ---------------------------------------------------------------------------  --------------  CALL BACK INFO  [phone]; Do not leave any message, patient will call back for answer  ---------------------------------------------------------------------------  --------------  SCRIPT ANSWERS  undefined

## 2024-04-04 ENCOUNTER — ANTI-COAG VISIT (OUTPATIENT)
Age: 79
End: 2024-04-04
Payer: MEDICARE

## 2024-04-04 DIAGNOSIS — Z95.3 HISTORY OF AORTIC VALVE REPLACEMENT WITH BIOPROSTHETIC VALVE: ICD-10-CM

## 2024-04-04 DIAGNOSIS — Z95.2 HISTORY OF MITRAL VALVE REPLACEMENT WITH MECHANICAL VALVE: Primary | ICD-10-CM

## 2024-04-04 DIAGNOSIS — Z79.01 LONG TERM (CURRENT) USE OF ANTICOAGULANTS: ICD-10-CM

## 2024-04-04 DIAGNOSIS — I48.21 PERMANENT ATRIAL FIBRILLATION (HCC): ICD-10-CM

## 2024-04-04 LAB
POC INR: 2.9
PROTHROMBIN TIME, POC: NORMAL

## 2024-04-04 PROCEDURE — 93793 ANTICOAG MGMT PT WARFARIN: CPT | Performed by: INTERNAL MEDICINE

## 2024-04-04 PROCEDURE — 85610 PROTHROMBIN TIME: CPT | Performed by: INTERNAL MEDICINE

## 2024-04-04 NOTE — PATIENT INSTRUCTIONS
Reminder: Please contact the Coumadin Clinic at [phone] when you have medication changes. Examples, new medications, antibiotics, discontinued medications, new supplements, missed doses of warfarin or if you took extra doses of warfarin.  This also includes OTC medications. Notifying us helps reduce the possibility of high and low INR's. In addition, if warfarin needs to be held for any procedures, please have surgeon or physician's office contact us before holding anticoagulant. Thanks, [facility] Cardiology Coumadin Clinic.

## 2024-04-05 NOTE — TELEPHONE ENCOUNTER
MEDICATION REFILL REQUEST      Name of Medication:  Carvedilol  Dose:  3.125 mg  Frequency:  BID  Quantity:  90  Days' supply:  90 with refills      Pharmacy Name/Location:  [facility]-[phone]

## 2024-04-07 RX ORDER — CARVEDILOL 3.12 MG/1
3.12 TABLET ORAL 2 TIMES DAILY WITH MEALS
Qty: 180 TABLET | Refills: 3 | Status: SHIPPED | OUTPATIENT
Start: 2024-04-07

## 2024-04-12 ASSESSMENT — PATIENT HEALTH QUESTIONNAIRE - PHQ9
1. LITTLE INTEREST OR PLEASURE IN DOING THINGS: NOT AT ALL
SUM OF ALL RESPONSES TO PHQ9 QUESTIONS 1 & 2: 0
SUM OF ALL RESPONSES TO PHQ QUESTIONS 1-9: 0
SUM OF ALL RESPONSES TO PHQ QUESTIONS 1-9: 0
2. FEELING DOWN, DEPRESSED OR HOPELESS: NOT AT ALL
SUM OF ALL RESPONSES TO PHQ QUESTIONS 1-9: 0
2. FEELING DOWN, DEPRESSED OR HOPELESS: NOT AT ALL
SUM OF ALL RESPONSES TO PHQ9 QUESTIONS 1 & 2: 0
SUM OF ALL RESPONSES TO PHQ QUESTIONS 1-9: 0
1. LITTLE INTEREST OR PLEASURE IN DOING THINGS: NOT AT ALL

## 2024-04-15 ENCOUNTER — OFFICE VISIT (OUTPATIENT)
Dept: INTERNAL MEDICINE CLINIC | Facility: CLINIC | Age: 79
End: 2024-04-15
Payer: MEDICARE

## 2024-04-15 VITALS
HEIGHT: 67 IN | WEIGHT: 181.2 LBS | BODY MASS INDEX: 28.44 KG/M2 | SYSTOLIC BLOOD PRESSURE: 118 MMHG | DIASTOLIC BLOOD PRESSURE: 62 MMHG

## 2024-04-15 DIAGNOSIS — Z79.01 WARFARIN ANTICOAGULATION: ICD-10-CM

## 2024-04-15 DIAGNOSIS — I48.21 PERMANENT ATRIAL FIBRILLATION (HCC): ICD-10-CM

## 2024-04-15 DIAGNOSIS — M25.561 CHRONIC PAIN OF RIGHT KNEE: ICD-10-CM

## 2024-04-15 DIAGNOSIS — I33.0 CHRONIC BACTERIAL ENDOCARDITIS: Chronic | ICD-10-CM

## 2024-04-15 DIAGNOSIS — G89.29 CHRONIC PAIN OF RIGHT KNEE: ICD-10-CM

## 2024-04-15 DIAGNOSIS — I10 PRIMARY HYPERTENSION: ICD-10-CM

## 2024-04-15 DIAGNOSIS — R60.9 FLUID RETENTION: ICD-10-CM

## 2024-04-15 DIAGNOSIS — Z95.0 PACEMAKER: Primary | ICD-10-CM

## 2024-04-15 DIAGNOSIS — D68.69 HYPERCOAGULABLE STATE, SECONDARY (HCC): Chronic | ICD-10-CM

## 2024-04-15 DIAGNOSIS — E03.4 HYPOTHYROIDISM DUE TO ACQUIRED ATROPHY OF THYROID: ICD-10-CM

## 2024-04-15 DIAGNOSIS — E78.5 DYSLIPIDEMIA: ICD-10-CM

## 2024-04-15 PROCEDURE — G8417 CALC BMI ABV UP PARAM F/U: HCPCS | Performed by: INTERNAL MEDICINE

## 2024-04-15 PROCEDURE — 3078F DIAST BP <80 MM HG: CPT | Performed by: INTERNAL MEDICINE

## 2024-04-15 PROCEDURE — 1036F TOBACCO NON-USER: CPT | Performed by: INTERNAL MEDICINE

## 2024-04-15 PROCEDURE — 99214 OFFICE O/P EST MOD 30 MIN: CPT | Performed by: INTERNAL MEDICINE

## 2024-04-15 PROCEDURE — 1123F ACP DISCUSS/DSCN MKR DOCD: CPT | Performed by: INTERNAL MEDICINE

## 2024-04-15 PROCEDURE — G8427 DOCREV CUR MEDS BY ELIG CLIN: HCPCS | Performed by: INTERNAL MEDICINE

## 2024-04-15 PROCEDURE — 1090F PRES/ABSN URINE INCON ASSESS: CPT | Performed by: INTERNAL MEDICINE

## 2024-04-15 PROCEDURE — 3074F SYST BP LT 130 MM HG: CPT | Performed by: INTERNAL MEDICINE

## 2024-04-15 PROCEDURE — G8399 PT W/DXA RESULTS DOCUMENT: HCPCS | Performed by: INTERNAL MEDICINE

## 2024-04-15 RX ORDER — WARFARIN SODIUM 2.5 MG/1
2.5 TABLET ORAL DAILY
COMMUNITY

## 2024-04-15 RX ORDER — PANTOPRAZOLE SODIUM 40 MG/1
40 TABLET, DELAYED RELEASE ORAL DAILY
Qty: 90 TABLET | Refills: 2 | Status: SHIPPED | OUTPATIENT
Start: 2024-04-15 | End: 2025-01-10

## 2024-04-15 ASSESSMENT — ENCOUNTER SYMPTOMS: SHORTNESS OF BREATH: 1

## 2024-04-15 NOTE — PROGRESS NOTES
needed        Current medications are therapeutic at this time; continue as prescribed.        Nelly Bañuelos MD

## 2024-04-28 NOTE — PROGRESS NOTES
tablet by mouth daily 90 tablet 3    potassium chloride (KLOR-CON) 10 MEQ extended release tablet Take 1 tablet by mouth daily 90 tablet 3    furosemide (LASIX) 20 MG tablet Take 1 tablet by mouth daily TAKE ONE TABLET BY MOUTH ONE TIME DAILY 90 tablet 3    Multiple Vitamins-Minerals (CENTRUM ULTRA WOMENS) TABS Take by mouth      cefadroxil (DURICEF) 500 MG capsule Take 1 capsule by mouth 2 times daily 180 capsule 3    aspirin 81 MG EC tablet Take 1 tablet by mouth daily      Biotin 2.5 MG CAPS Take 2 tablets by mouth 2 times daily      vitamin D (CHOLECALCIFEROL) 25 MCG (1000 UT) TABS tablet Take 1 tablet by mouth daily      sucralfate (CARAFATE) 1 GM tablet Take 1 tablet by mouth daily (Patient not taking: Reported on 5/1/2024)       No current facility-administered medications for this visit.            Allergies   Allergen Reactions    Paxlovid [Nirmatrelvir-Ritonavir] Other (See Comments)     DO NOT GIVE HER PAXLOVID DUE TO CARDIAC ISSUES AND MECHANICAL HEART VALVE - CONTRAINDICATED IN HER CASE-  CEHNG ROSA MD          Penicillins Rash       Patient Active Problem List    Diagnosis Date Noted    Acute upper gastrointestinal hemorrhage 11/06/2022     Priority: Medium    ABLA (acute blood loss anemia) 11/06/2022     Priority: Medium    Warfarin anticoagulation 11/06/2022     Priority: Medium    Hypercoagulable state, secondary (HCC) 11/06/2022     Priority: Medium    Permanent atrial fibrillation (HCC) 10/03/2019     Priority: Low    Long term (current) use of anticoagulants 07/18/2018     Priority: Low    History of mitral valve replacement with mechanical valve      Priority: Low     Overview Note:     mechanical mitral valve  2001 at [facility]        Cardiac pacemaker 07/15/2016     Priority: Low    History of aortic valve replacement with bioprosthetic valve 07/15/2016     Priority: Low    History of prosthetic heart valve      Priority: Low     Overview Note:     Bovine aortic valve  2009 at

## 2024-05-01 ENCOUNTER — OFFICE VISIT (OUTPATIENT)
Age: 79
End: 2024-05-01
Payer: MEDICARE

## 2024-05-01 ENCOUNTER — ANTI-COAG VISIT (OUTPATIENT)
Age: 79
End: 2024-05-01
Payer: MEDICARE

## 2024-05-01 ENCOUNTER — NURSE ONLY (OUTPATIENT)
Age: 79
End: 2024-05-01
Payer: MEDICARE

## 2024-05-01 VITALS
BODY MASS INDEX: 29.09 KG/M2 | DIASTOLIC BLOOD PRESSURE: 70 MMHG | HEART RATE: 80 BPM | HEIGHT: 66 IN | WEIGHT: 181 LBS | SYSTOLIC BLOOD PRESSURE: 118 MMHG

## 2024-05-01 DIAGNOSIS — Z95.3 HISTORY OF AORTIC VALVE REPLACEMENT WITH BIOPROSTHETIC VALVE: ICD-10-CM

## 2024-05-01 DIAGNOSIS — I44.2 AV BLOCK, COMPLETE (HCC): ICD-10-CM

## 2024-05-01 DIAGNOSIS — I48.21 PERMANENT ATRIAL FIBRILLATION (HCC): ICD-10-CM

## 2024-05-01 DIAGNOSIS — Z95.0 CARDIAC PACEMAKER: ICD-10-CM

## 2024-05-01 DIAGNOSIS — I10 PRIMARY HYPERTENSION: ICD-10-CM

## 2024-05-01 DIAGNOSIS — E78.5 DYSLIPIDEMIA: ICD-10-CM

## 2024-05-01 DIAGNOSIS — Z79.01 LONG TERM (CURRENT) USE OF ANTICOAGULANTS: ICD-10-CM

## 2024-05-01 DIAGNOSIS — I48.20 CHRONIC A-FIB (HCC): Primary | ICD-10-CM

## 2024-05-01 DIAGNOSIS — Z95.2 HISTORY OF MITRAL VALVE REPLACEMENT WITH MECHANICAL VALVE: ICD-10-CM

## 2024-05-01 DIAGNOSIS — Z95.2 HISTORY OF MITRAL VALVE REPLACEMENT WITH MECHANICAL VALVE: Primary | ICD-10-CM

## 2024-05-01 DIAGNOSIS — I48.20 CHRONIC A-FIB (HCC): ICD-10-CM

## 2024-05-01 DIAGNOSIS — I50.22 CHRONIC SYSTOLIC HEART FAILURE (HCC): Primary | ICD-10-CM

## 2024-05-01 DIAGNOSIS — D68.69 HYPERCOAGULABLE STATE, SECONDARY (HCC): Chronic | ICD-10-CM

## 2024-05-01 DIAGNOSIS — I33.0 CHRONIC BACTERIAL ENDOCARDITIS: Chronic | ICD-10-CM

## 2024-05-01 LAB
POC INR: 3.3
PROTHROMBIN TIME, POC: NORMAL

## 2024-05-01 PROCEDURE — 3078F DIAST BP <80 MM HG: CPT | Performed by: INTERNAL MEDICINE

## 2024-05-01 PROCEDURE — G8427 DOCREV CUR MEDS BY ELIG CLIN: HCPCS | Performed by: INTERNAL MEDICINE

## 2024-05-01 PROCEDURE — 85610 PROTHROMBIN TIME: CPT | Performed by: INTERNAL MEDICINE

## 2024-05-01 PROCEDURE — 1123F ACP DISCUSS/DSCN MKR DOCD: CPT | Performed by: INTERNAL MEDICINE

## 2024-05-01 PROCEDURE — 1090F PRES/ABSN URINE INCON ASSESS: CPT | Performed by: INTERNAL MEDICINE

## 2024-05-01 PROCEDURE — G8417 CALC BMI ABV UP PARAM F/U: HCPCS | Performed by: INTERNAL MEDICINE

## 2024-05-01 PROCEDURE — 93280 PM DEVICE PROGR EVAL DUAL: CPT | Performed by: INTERNAL MEDICINE

## 2024-05-01 PROCEDURE — 3074F SYST BP LT 130 MM HG: CPT | Performed by: INTERNAL MEDICINE

## 2024-05-01 PROCEDURE — G8399 PT W/DXA RESULTS DOCUMENT: HCPCS | Performed by: INTERNAL MEDICINE

## 2024-05-01 PROCEDURE — 1036F TOBACCO NON-USER: CPT | Performed by: INTERNAL MEDICINE

## 2024-05-01 PROCEDURE — 99214 OFFICE O/P EST MOD 30 MIN: CPT | Performed by: INTERNAL MEDICINE

## 2024-05-01 NOTE — PROGRESS NOTES
Warfarin tablet strength and weekly dosing schedule confirmed today. Therapeutic INR, patient to continue current maintenance plan (see Anticoag Dosing Calendar). INR to be rechecked in four week(s).

## 2024-05-06 PROCEDURE — 93296 REM INTERROG EVL PM/IDS: CPT | Performed by: INTERNAL MEDICINE

## 2024-05-06 PROCEDURE — 93294 REM INTERROG EVL PM/LDLS PM: CPT | Performed by: INTERNAL MEDICINE

## 2024-05-29 ENCOUNTER — ANTI-COAG VISIT (OUTPATIENT)
Age: 79
End: 2024-05-29
Payer: MEDICARE

## 2024-05-29 DIAGNOSIS — Z95.3 HISTORY OF AORTIC VALVE REPLACEMENT WITH BIOPROSTHETIC VALVE: ICD-10-CM

## 2024-05-29 DIAGNOSIS — I48.21 PERMANENT ATRIAL FIBRILLATION (HCC): ICD-10-CM

## 2024-05-29 DIAGNOSIS — Z95.2 HISTORY OF MITRAL VALVE REPLACEMENT WITH MECHANICAL VALVE: Primary | ICD-10-CM

## 2024-05-29 DIAGNOSIS — Z79.01 LONG TERM (CURRENT) USE OF ANTICOAGULANTS: ICD-10-CM

## 2024-05-29 LAB
POC INR: 2.3
PROTHROMBIN TIME, POC: ABNORMAL

## 2024-05-29 PROCEDURE — 93793 ANTICOAG MGMT PT WARFARIN: CPT | Performed by: INTERNAL MEDICINE

## 2024-05-29 PROCEDURE — 85610 PROTHROMBIN TIME: CPT | Performed by: INTERNAL MEDICINE

## 2024-05-29 NOTE — PATIENT INSTRUCTIONS
Reminder: Please contact the Coumadin Clinic at [phone] when you have medication changes. Examples, new medications, antibiotics, discontinued medications, new supplements, missed doses of warfarin or if you took extra doses of warfarin.  This also includes OTC medications. Notifying us helps reduce the possibility of high and low INR's. In addition, if warfarin needs to be held for any procedures, please have surgeon or physician's office contact us before holding anticoagulant. Thanks, [facility] Cardiology Coumadin Clinic.       Please go to the Emergency Department if you experience:  - Extreme shortness of breath or chest pain  - Red, warm, painful, swollen limb  - Numbness or tingling on one side of the body  - Slurred speech or major vision change  - Extreme headache

## 2024-06-19 DIAGNOSIS — I33.0 CHRONIC BACTERIAL ENDOCARDITIS: Primary | ICD-10-CM

## 2024-06-19 RX ORDER — CEFADROXIL 500 MG/1
500 CAPSULE ORAL 2 TIMES DAILY
Qty: 180 CAPSULE | Refills: 3 | Status: SHIPPED | OUTPATIENT
Start: 2024-06-19

## 2024-06-19 NOTE — TELEPHONE ENCOUNTER
Requested Prescriptions     Pending Prescriptions Disp Refills    cefadroxil (DURICEF) 500 MG capsule 180 capsule 3     Sig: Take 1 capsule by mouth 2 times daily        Verified rx. Last OV 5/1/24. Erx to pharm on file.

## 2024-06-19 NOTE — TELEPHONE ENCOUNTER
MEDICATION REFILL REQUEST      Name of Medication:  Cefadroxil  Dose:  500 mg  Frequency:  BID  Quantity:  180  Days' supply:  90 with 3 refills      Pharmacy Name/Location:  [id number]

## 2024-06-24 ENCOUNTER — ANTI-COAG VISIT (OUTPATIENT)
Age: 79
End: 2024-06-24
Payer: MEDICARE

## 2024-06-24 DIAGNOSIS — Z95.3 HISTORY OF AORTIC VALVE REPLACEMENT WITH BIOPROSTHETIC VALVE: ICD-10-CM

## 2024-06-24 DIAGNOSIS — Z79.01 LONG TERM (CURRENT) USE OF ANTICOAGULANTS: ICD-10-CM

## 2024-06-24 DIAGNOSIS — I48.21 PERMANENT ATRIAL FIBRILLATION (HCC): ICD-10-CM

## 2024-06-24 DIAGNOSIS — Z95.2 HISTORY OF MITRAL VALVE REPLACEMENT WITH MECHANICAL VALVE: Primary | ICD-10-CM

## 2024-06-24 LAB
POC INR: 2.8
PROTHROMBIN TIME, POC: NORMAL

## 2024-06-24 PROCEDURE — 93793 ANTICOAG MGMT PT WARFARIN: CPT | Performed by: INTERNAL MEDICINE

## 2024-06-24 PROCEDURE — 85610 PROTHROMBIN TIME: CPT | Performed by: INTERNAL MEDICINE

## 2024-06-24 NOTE — PATIENT INSTRUCTIONS
Reminder: Please contact the Coumadin Clinic at [phone] when you have medication changes. Examples, new medications, antibiotics, discontinued medications, new supplements, missed doses of warfarin or if you took extra doses of warfarin.  This also includes OTC medications. Notifying us helps reduce the possibility of high and low INR's. In addition, if warfarin needs to be held for any procedures, please have surgeon or physician's office contact us before holding anticoagulant. Thanks, [facility] Cardiology Coumadin Clinic.       Please go to the Emergency Department if you experience:  - Extreme shortness of breath or chest pain  - Red, warm, painful, swollen limb  - Numbness or tingling on one side of the body  - Slurred speech or major vision change  - Extreme headache

## 2024-06-30 DIAGNOSIS — E03.9 ACQUIRED HYPOTHYROIDISM: ICD-10-CM

## 2024-06-30 RX ORDER — LEVOTHYROXINE SODIUM 50 UG/1
TABLET ORAL
Qty: 90 TABLET | Refills: 3 | OUTPATIENT
Start: 2024-06-30

## 2024-07-26 ENCOUNTER — ANTI-COAG VISIT (OUTPATIENT)
Age: 79
End: 2024-07-26

## 2024-07-26 DIAGNOSIS — Z95.2 HISTORY OF MITRAL VALVE REPLACEMENT WITH MECHANICAL VALVE: Primary | ICD-10-CM

## 2024-07-26 DIAGNOSIS — Z79.01 LONG TERM (CURRENT) USE OF ANTICOAGULANTS: ICD-10-CM

## 2024-07-26 DIAGNOSIS — Z95.3 HISTORY OF AORTIC VALVE REPLACEMENT WITH BIOPROSTHETIC VALVE: ICD-10-CM

## 2024-07-26 DIAGNOSIS — I48.21 PERMANENT ATRIAL FIBRILLATION (HCC): ICD-10-CM

## 2024-07-26 LAB
POC INR: 2.1
PROTHROMBIN TIME, POC: NORMAL

## 2024-07-29 RX ORDER — WARFARIN SODIUM 5 MG/1
2.5 TABLET ORAL DAILY
Qty: 90 TABLET | Refills: 3 | Status: SHIPPED | OUTPATIENT
Start: 2024-07-29

## 2024-07-29 NOTE — TELEPHONE ENCOUNTER
MEDICATION REFILL REQUEST      Name of Medication:  Jantoven  Dose:  5 mg  Frequency:  QD  Days' supply:  90      Pharmacy Name/Location:  [phone]    Pt is out of med ,please call in asap

## 2024-08-09 ENCOUNTER — ANTI-COAG VISIT (OUTPATIENT)
Age: 79
End: 2024-08-09

## 2024-08-09 DIAGNOSIS — Z79.01 LONG TERM (CURRENT) USE OF ANTICOAGULANTS: ICD-10-CM

## 2024-08-09 DIAGNOSIS — I48.21 PERMANENT ATRIAL FIBRILLATION (HCC): ICD-10-CM

## 2024-08-09 DIAGNOSIS — Z95.3 HISTORY OF AORTIC VALVE REPLACEMENT WITH BIOPROSTHETIC VALVE: ICD-10-CM

## 2024-08-09 DIAGNOSIS — Z95.2 HISTORY OF MITRAL VALVE REPLACEMENT WITH MECHANICAL VALVE: Primary | ICD-10-CM

## 2024-08-09 LAB
POC INR: 2.2
PROTHROMBIN TIME, POC: ABNORMAL

## 2024-08-09 NOTE — PATIENT INSTRUCTIONS
Reminder: Please contact the Coumadin Clinic at [phone] when you have medication changes. Examples, new medications, antibiotics, discontinued medications, new supplements, missed doses of warfarin or if you took extra doses of warfarin.  This also includes OTC medications. Notifying us helps reduce the possibility of high and low INR's. In addition, if warfarin needs to be held for any procedures, please have surgeon or physician's office contact us before holding anticoagulant. Thanks, [facility] Cardiology Coumadin Clinic.       Please go to the Emergency Department if you experience:  - Extreme shortness of breath or chest pain  - Red, warm, painful, swollen limb  - Numbness or tingling on one side of the body  - Slurred speech or major vision change  - Extreme headache

## 2024-08-09 NOTE — PROGRESS NOTES
Warfarin tablet strength and weekly dosing schedule confirmed today. Patient knows of no reason for subtherapeutic INR result.  Maintenance plan decreased, (see Anticoag Dosing Calendar). INR to be rechecked in two weeks.

## 2024-08-12 DIAGNOSIS — I50.22 CHRONIC SYSTOLIC HEART FAILURE (HCC): ICD-10-CM

## 2024-08-12 RX ORDER — POTASSIUM CHLORIDE 750 MG/1
10 TABLET, FILM COATED, EXTENDED RELEASE ORAL DAILY
Qty: 90 TABLET | Refills: 3 | Status: SHIPPED | OUTPATIENT
Start: 2024-08-12

## 2024-08-12 RX ORDER — FUROSEMIDE 20 MG/1
20 TABLET ORAL DAILY
Qty: 90 TABLET | Refills: 3 | Status: SHIPPED | OUTPATIENT
Start: 2024-08-12

## 2024-08-12 RX ORDER — POTASSIUM CHLORIDE 750 MG/1
10 TABLET, EXTENDED RELEASE ORAL DAILY
Qty: 90 TABLET | Refills: 3 | OUTPATIENT
Start: 2024-08-12

## 2024-08-13 PROCEDURE — 93296 REM INTERROG EVL PM/IDS: CPT | Performed by: INTERNAL MEDICINE

## 2024-08-13 PROCEDURE — 93294 REM INTERROG EVL PM/LDLS PM: CPT | Performed by: INTERNAL MEDICINE

## 2024-08-23 ENCOUNTER — ANTI-COAG VISIT (OUTPATIENT)
Age: 79
End: 2024-08-23

## 2024-08-23 DIAGNOSIS — Z95.3 HISTORY OF AORTIC VALVE REPLACEMENT WITH BIOPROSTHETIC VALVE: ICD-10-CM

## 2024-08-23 DIAGNOSIS — Z95.2 HISTORY OF MITRAL VALVE REPLACEMENT WITH MECHANICAL VALVE: Primary | ICD-10-CM

## 2024-08-23 DIAGNOSIS — I48.21 PERMANENT ATRIAL FIBRILLATION (HCC): ICD-10-CM

## 2024-08-23 DIAGNOSIS — Z79.01 LONG TERM (CURRENT) USE OF ANTICOAGULANTS: ICD-10-CM

## 2024-08-23 LAB
POC INR: 3
PROTHROMBIN TIME, POC: NORMAL

## 2024-08-23 NOTE — PATIENT INSTRUCTIONS
Reminder: Please contact the Coumadin Clinic at [phone] when you have medication changes. Examples, new medications, antibiotics, discontinued medications, new supplements, missed doses of warfarin or if you took extra doses of warfarin.  This also includes OTC medications. Notifying us helps reduce the possibility of high and low INR's. In addition, if warfarin needs to be held for any procedures, please have surgeon or physician's office contact us before holding anticoagulant. Thanks, [facility] Cardiology Coumadin Clinic.       Please go to the Emergency Department if you experience:  - Extreme shortness of breath or chest pain  - Red, warm, painful, swollen limb  - Numbness or tingling on one side of the body  - Slurred speech or major vision change  - Extreme headache     
49

## 2024-09-06 ENCOUNTER — ANTI-COAG VISIT (OUTPATIENT)
Age: 79
End: 2024-09-06

## 2024-09-06 DIAGNOSIS — Z95.3 HISTORY OF AORTIC VALVE REPLACEMENT WITH BIOPROSTHETIC VALVE: ICD-10-CM

## 2024-09-06 DIAGNOSIS — Z79.01 LONG TERM (CURRENT) USE OF ANTICOAGULANTS: ICD-10-CM

## 2024-09-06 DIAGNOSIS — I48.21 PERMANENT ATRIAL FIBRILLATION (HCC): ICD-10-CM

## 2024-09-06 DIAGNOSIS — Z95.2 HISTORY OF MITRAL VALVE REPLACEMENT WITH MECHANICAL VALVE: Primary | ICD-10-CM

## 2024-09-06 NOTE — PATIENT INSTRUCTIONS
Reminder: Please contact the [facility] at [phone] when you have medication changes. Examples, new medications, antibiotics, discontinued medications, new supplements, missed doses of warfarin or if you took extra doses of warfarin.  This also includes OTC medications. Notifying us helps reduce the possibility of high and low INR's. In addition, if warfarin needs to be held for any procedures, please have surgeon or physician's office contact us before holding anticoagulant. Thanks, [facility].       Please go to the Emergency Department if you experience:  - Extreme shortness of breath or chest pain  - Red, warm, painful, swollen limb  - Numbness or tingling on one side of the body  - Slurred speech or major vision change  - Extreme headache

## 2024-09-13 DIAGNOSIS — E03.9 ACQUIRED HYPOTHYROIDISM: ICD-10-CM

## 2024-09-14 ENCOUNTER — HOSPITAL ENCOUNTER (EMERGENCY)
Age: 79
Discharge: HOME | End: 2024-09-14
Payer: MEDICARE

## 2024-09-14 VITALS
BODY MASS INDEX: 29.09 KG/M2 | DIASTOLIC BLOOD PRESSURE: 58 MMHG | HEIGHT: 66 IN | WEIGHT: 181 LBS | HEART RATE: 71 BPM | TEMPERATURE: 98.2 F | RESPIRATION RATE: 16 BRPM | SYSTOLIC BLOOD PRESSURE: 127 MMHG | OXYGEN SATURATION: 97 %
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
## 2024-09-14 PROCEDURE — 6370000000 HC RX 637 (ALT 250 FOR IP): Performed by: PHYSICIAN ASSISTANT

## 2024-09-14 PROCEDURE — 99283 EMERGENCY DEPT VISIT LOW MDM: CPT

## 2024-09-14 RX ORDER — DOXYCYCLINE 100 MG/1
100 CAPSULE ORAL
Status: COMPLETED | OUTPATIENT
Start: 2024-09-14 | End: 2024-09-14

## 2024-09-14 RX ORDER — TRIAMCINOLONE ACETONIDE 1 MG/G
CREAM TOPICAL
Qty: 80 G | Refills: 0 | Status: SHIPPED | OUTPATIENT
Start: 2024-09-14

## 2024-09-14 RX ORDER — LEVOTHYROXINE SODIUM 50 UG/1
TABLET ORAL
Qty: 90 TABLET | Refills: 3 | OUTPATIENT
Start: 2024-09-14

## 2024-09-14 RX ORDER — DOXYCYCLINE HYCLATE 100 MG
100 TABLET ORAL 2 TIMES DAILY
Qty: 20 TABLET | Refills: 0 | Status: SHIPPED | OUTPATIENT
Start: 2024-09-14 | End: 2024-09-24

## 2024-09-14 RX ADMIN — DOXYCYCLINE HYCLATE 100 MG: 100 CAPSULE ORAL at 16:22

## 2024-09-14 ASSESSMENT — PAIN - FUNCTIONAL ASSESSMENT: PAIN_FUNCTIONAL_ASSESSMENT: NONE - DENIES PAIN

## 2024-10-04 ENCOUNTER — ANTI-COAG VISIT (OUTPATIENT)
Age: 79
End: 2024-10-04

## 2024-10-04 DIAGNOSIS — Z95.3 HISTORY OF AORTIC VALVE REPLACEMENT WITH BIOPROSTHETIC VALVE: ICD-10-CM

## 2024-10-04 DIAGNOSIS — Z95.2 HISTORY OF MITRAL VALVE REPLACEMENT WITH MECHANICAL VALVE: Primary | ICD-10-CM

## 2024-10-04 DIAGNOSIS — I48.21 PERMANENT ATRIAL FIBRILLATION (HCC): ICD-10-CM

## 2024-10-04 DIAGNOSIS — Z79.01 LONG TERM (CURRENT) USE OF ANTICOAGULANTS: ICD-10-CM

## 2024-10-04 LAB
POC INR: 2.7
PROTHROMBIN TIME, POC: NORMAL

## 2024-10-04 NOTE — PATIENT INSTRUCTIONS
Reminder: Please contact the [facility] Clinic at [phone] when you have medication changes. Examples, new medications, antibiotics, discontinued medications, new supplements, missed doses of warfarin or if you took extra doses of warfarin.  This also includes OTC medications. Notifying us helps reduce the possibility of high and low INR's. In addition, if warfarin needs to be held for any procedures, please have surgeon or physician's office contact us before holding anticoagulant. Thanks, [facility].       Please go to the Emergency Department if you experience:  - Extreme shortness of breath or chest pain  - Red, warm, painful, swollen limb  - Numbness or tingling on one side of the body  - Slurred speech or major vision change  - Extreme headache

## 2024-10-08 DIAGNOSIS — E03.9 ACQUIRED HYPOTHYROIDISM: ICD-10-CM

## 2024-10-08 NOTE — TELEPHONE ENCOUNTER
Pt called for a Medication Refill Request    Name of Medication : levothyroxine (SYNTHROID)     Strength of Medication: 50 MCG tablet     Directions: Take 1 tablet by mouth Daily     30 day or 90 day supply: 90 days    Preferred Pharmacy: [facility] [address]  [address] [phone]     Last Appt. Date: 3/12/2024    Next Appt. Date: none    Additional Information For Provider:

## 2024-10-09 RX ORDER — LEVOTHYROXINE SODIUM 50 UG/1
50 TABLET ORAL DAILY
Qty: 90 TABLET | Refills: 1 | Status: SHIPPED | OUTPATIENT
Start: 2024-10-09

## 2024-10-18 ENCOUNTER — TELEPHONE (OUTPATIENT)
Age: 79
End: 2024-10-18

## 2024-10-18 NOTE — TELEPHONE ENCOUNTER
Patient called stating she needs the following  :    New Handicap Placard    Please call and advise.

## 2024-10-25 SDOH — ECONOMIC STABILITY: INCOME INSECURITY: HOW HARD IS IT FOR YOU TO PAY FOR THE VERY BASICS LIKE FOOD, HOUSING, MEDICAL CARE, AND HEATING?: PATIENT DECLINED

## 2024-10-25 SDOH — ECONOMIC STABILITY: FOOD INSECURITY: WITHIN THE PAST 12 MONTHS, THE FOOD YOU BOUGHT JUST DIDN'T LAST AND YOU DIDN'T HAVE MONEY TO GET MORE.: PATIENT DECLINED

## 2024-10-25 SDOH — ECONOMIC STABILITY: FOOD INSECURITY: WITHIN THE PAST 12 MONTHS, YOU WORRIED THAT YOUR FOOD WOULD RUN OUT BEFORE YOU GOT MONEY TO BUY MORE.: PATIENT DECLINED

## 2024-10-25 SDOH — ECONOMIC STABILITY: TRANSPORTATION INSECURITY
IN THE PAST 12 MONTHS, HAS LACK OF TRANSPORTATION KEPT YOU FROM MEETINGS, WORK, OR FROM GETTING THINGS NEEDED FOR DAILY LIVING?: PATIENT DECLINED

## 2024-10-28 ENCOUNTER — OFFICE VISIT (OUTPATIENT)
Dept: INTERNAL MEDICINE CLINIC | Facility: CLINIC | Age: 79
End: 2024-10-28
Payer: MEDICARE

## 2024-10-28 VITALS
DIASTOLIC BLOOD PRESSURE: 64 MMHG | HEIGHT: 66 IN | SYSTOLIC BLOOD PRESSURE: 118 MMHG | WEIGHT: 181.2 LBS | BODY MASS INDEX: 29.12 KG/M2

## 2024-10-28 DIAGNOSIS — E78.5 DYSLIPIDEMIA: ICD-10-CM

## 2024-10-28 DIAGNOSIS — Z00.00 ENCOUNTER FOR MEDICARE ANNUAL WELLNESS EXAM: Primary | ICD-10-CM

## 2024-10-28 DIAGNOSIS — Z95.0 CARDIAC PACEMAKER: ICD-10-CM

## 2024-10-28 DIAGNOSIS — Z79.01 LONG TERM (CURRENT) USE OF ANTICOAGULANTS: ICD-10-CM

## 2024-10-28 DIAGNOSIS — I48.20 CHRONIC A-FIB (HCC): ICD-10-CM

## 2024-10-28 DIAGNOSIS — I10 PRIMARY HYPERTENSION: ICD-10-CM

## 2024-10-28 PROBLEM — K92.2 ACUTE UPPER GASTROINTESTINAL HEMORRHAGE: Status: RESOLVED | Noted: 2022-11-06 | Resolved: 2024-10-28

## 2024-10-28 PROBLEM — D62 ABLA (ACUTE BLOOD LOSS ANEMIA): Status: RESOLVED | Noted: 2022-11-06 | Resolved: 2024-10-28

## 2024-10-28 PROCEDURE — G8399 PT W/DXA RESULTS DOCUMENT: HCPCS | Performed by: INTERNAL MEDICINE

## 2024-10-28 PROCEDURE — 1036F TOBACCO NON-USER: CPT | Performed by: INTERNAL MEDICINE

## 2024-10-28 PROCEDURE — G8484 FLU IMMUNIZE NO ADMIN: HCPCS | Performed by: INTERNAL MEDICINE

## 2024-10-28 PROCEDURE — 1160F RVW MEDS BY RX/DR IN RCRD: CPT | Performed by: INTERNAL MEDICINE

## 2024-10-28 PROCEDURE — G0439 PPPS, SUBSEQ VISIT: HCPCS | Performed by: INTERNAL MEDICINE

## 2024-10-28 PROCEDURE — 1123F ACP DISCUSS/DSCN MKR DOCD: CPT | Performed by: INTERNAL MEDICINE

## 2024-10-28 PROCEDURE — 3074F SYST BP LT 130 MM HG: CPT | Performed by: INTERNAL MEDICINE

## 2024-10-28 PROCEDURE — G8417 CALC BMI ABV UP PARAM F/U: HCPCS | Performed by: INTERNAL MEDICINE

## 2024-10-28 PROCEDURE — 99214 OFFICE O/P EST MOD 30 MIN: CPT | Performed by: INTERNAL MEDICINE

## 2024-10-28 PROCEDURE — 1090F PRES/ABSN URINE INCON ASSESS: CPT | Performed by: INTERNAL MEDICINE

## 2024-10-28 PROCEDURE — 1159F MED LIST DOCD IN RCRD: CPT | Performed by: INTERNAL MEDICINE

## 2024-10-28 PROCEDURE — 3078F DIAST BP <80 MM HG: CPT | Performed by: INTERNAL MEDICINE

## 2024-10-28 PROCEDURE — G8427 DOCREV CUR MEDS BY ELIG CLIN: HCPCS | Performed by: INTERNAL MEDICINE

## 2024-10-28 ASSESSMENT — PATIENT HEALTH QUESTIONNAIRE - PHQ9
SUM OF ALL RESPONSES TO PHQ QUESTIONS 1-9: 0
2. FEELING DOWN, DEPRESSED OR HOPELESS: NOT AT ALL
SUM OF ALL RESPONSES TO PHQ QUESTIONS 1-9: 0
1. LITTLE INTEREST OR PLEASURE IN DOING THINGS: NOT AT ALL
SUM OF ALL RESPONSES TO PHQ9 QUESTIONS 1 & 2: 0
SUM OF ALL RESPONSES TO PHQ QUESTIONS 1-9: 0
SUM OF ALL RESPONSES TO PHQ QUESTIONS 1-9: 0

## 2024-10-28 ASSESSMENT — ENCOUNTER SYMPTOMS: SHORTNESS OF BREATH: 0

## 2024-10-28 NOTE — PROGRESS NOTES
MD Andrez   Multiple Vitamins-Minerals (CENTRUM ULTRA WOMENS) TABS Take by mouth Yes Provider, MD Theodore   aspirin 81 MG EC tablet Take 1 tablet by mouth daily Yes Automatic Reconciliation, Ar   Biotin 2.5 MG CAPS Take 2 tablets by mouth 2 times daily Yes Automatic Reconciliation, Ar   vitamin D (CHOLECALCIFEROL) 25 MCG (1000 UT) TABS tablet Take 1 tablet by mouth daily Yes Automatic Reconciliation, Ar       CareTeam (Including outside providers/suppliers regularly involved in providing care):   Patient Care Team:  Nelly Bañuelos MD as PCP - General (Internal Medicine)  Delbert Dorsey MD as PCP - Empaneled Provider      Reviewed and updated this visit:  Tobacco  Allergies  Meds  Problems  Med Hx  Surg Hx  Soc Hx  Fam Hx                Current medications are therapeutic at this time; continue as prescribed.        Nelly Bañuelos MD

## 2024-10-30 DIAGNOSIS — I48.21 PERMANENT ATRIAL FIBRILLATION (HCC): ICD-10-CM

## 2024-10-30 DIAGNOSIS — E03.4 HYPOTHYROIDISM DUE TO ACQUIRED ATROPHY OF THYROID: ICD-10-CM

## 2024-10-30 DIAGNOSIS — I10 PRIMARY HYPERTENSION: ICD-10-CM

## 2024-10-30 DIAGNOSIS — E78.5 DYSLIPIDEMIA: ICD-10-CM

## 2024-10-30 LAB
ALBUMIN SERPL-MCNC: 4 G/DL (ref 3.2–4.6)
ALBUMIN/GLOB SERPL: 1.2 (ref 1–1.9)
ALP SERPL-CCNC: 90 U/L (ref 35–104)
ALT SERPL-CCNC: 11 U/L (ref 8–45)
ANION GAP SERPL CALC-SCNC: 9 MMOL/L (ref 7–16)
AST SERPL-CCNC: 22 U/L (ref 15–37)
BASOPHILS # BLD: 0 K/UL (ref 0–0.2)
BASOPHILS NFR BLD: 1 % (ref 0–2)
BILIRUB SERPL-MCNC: 0.6 MG/DL (ref 0–1.2)
BUN SERPL-MCNC: 24 MG/DL (ref 8–23)
CALCIUM SERPL-MCNC: 9.3 MG/DL (ref 8.8–10.2)
CHLORIDE SERPL-SCNC: 105 MMOL/L (ref 98–107)
CHOLEST SERPL-MCNC: 142 MG/DL (ref 0–200)
CO2 SERPL-SCNC: 26 MMOL/L (ref 20–29)
CREAT SERPL-MCNC: 1.02 MG/DL (ref 0.6–1.1)
DIFFERENTIAL METHOD BLD: ABNORMAL
EOSINOPHIL # BLD: 0.1 K/UL (ref 0–0.8)
EOSINOPHIL NFR BLD: 3 % (ref 0.5–7.8)
ERYTHROCYTE [DISTWIDTH] IN BLOOD BY AUTOMATED COUNT: 12.7 % (ref 11.9–14.6)
GLOBULIN SER CALC-MCNC: 3.2 G/DL (ref 2.3–3.5)
GLUCOSE SERPL-MCNC: 89 MG/DL (ref 70–99)
HCT VFR BLD AUTO: 33.7 % (ref 35.8–46.3)
HDLC SERPL-MCNC: 49 MG/DL (ref 40–60)
HDLC SERPL: 2.9 (ref 0–5)
HGB BLD-MCNC: 10.9 G/DL (ref 11.7–15.4)
IMM GRANULOCYTES # BLD AUTO: 0 K/UL (ref 0–0.5)
IMM GRANULOCYTES NFR BLD AUTO: 0 % (ref 0–5)
LDLC SERPL CALC-MCNC: 72 MG/DL (ref 0–100)
LYMPHOCYTES # BLD: 1.1 K/UL (ref 0.5–4.6)
LYMPHOCYTES NFR BLD: 21 % (ref 13–44)
MCH RBC QN AUTO: 29.8 PG (ref 26.1–32.9)
MCHC RBC AUTO-ENTMCNC: 32.3 G/DL (ref 31.4–35)
MCV RBC AUTO: 92.1 FL (ref 82–102)
MONOCYTES # BLD: 0.6 K/UL (ref 0.1–1.3)
MONOCYTES NFR BLD: 11 % (ref 4–12)
NEUTS SEG # BLD: 3.4 K/UL (ref 1.7–8.2)
NEUTS SEG NFR BLD: 64 % (ref 43–78)
NRBC # BLD: 0 K/UL (ref 0–0.2)
PLATELET # BLD AUTO: 166 K/UL (ref 150–450)
PMV BLD AUTO: 10.6 FL (ref 9.4–12.3)
POTASSIUM SERPL-SCNC: 4.4 MMOL/L (ref 3.5–5.1)
PROT SERPL-MCNC: 7.3 G/DL (ref 6.3–8.2)
RBC # BLD AUTO: 3.66 M/UL (ref 4.05–5.2)
SODIUM SERPL-SCNC: 139 MMOL/L (ref 136–145)
TRIGL SERPL-MCNC: 107 MG/DL (ref 0–150)
TSH, 3RD GENERATION: 2.78 UIU/ML (ref 0.27–4.2)
VLDLC SERPL CALC-MCNC: 21 MG/DL (ref 6–23)
WBC # BLD AUTO: 5.3 K/UL (ref 4.3–11.1)

## 2024-11-01 ENCOUNTER — ANTI-COAG VISIT (OUTPATIENT)
Age: 79
End: 2024-11-01

## 2024-11-01 DIAGNOSIS — I48.21 PERMANENT ATRIAL FIBRILLATION (HCC): ICD-10-CM

## 2024-11-01 DIAGNOSIS — Z79.01 LONG TERM (CURRENT) USE OF ANTICOAGULANTS: ICD-10-CM

## 2024-11-01 DIAGNOSIS — Z95.3 HISTORY OF AORTIC VALVE REPLACEMENT WITH BIOPROSTHETIC VALVE: ICD-10-CM

## 2024-11-01 DIAGNOSIS — Z95.2 HISTORY OF MITRAL VALVE REPLACEMENT WITH MECHANICAL VALVE: Primary | ICD-10-CM

## 2024-11-01 LAB
POC INR: 2.4
PROTHROMBIN TIME, POC: NORMAL

## 2024-11-15 ENCOUNTER — ANTI-COAG VISIT (OUTPATIENT)
Age: 79
End: 2024-11-15

## 2024-11-15 DIAGNOSIS — Z95.2 HISTORY OF MITRAL VALVE REPLACEMENT WITH MECHANICAL VALVE: Primary | ICD-10-CM

## 2024-11-15 DIAGNOSIS — I48.21 PERMANENT ATRIAL FIBRILLATION (HCC): ICD-10-CM

## 2024-11-15 DIAGNOSIS — Z95.3 HISTORY OF AORTIC VALVE REPLACEMENT WITH BIOPROSTHETIC VALVE: ICD-10-CM

## 2024-11-15 DIAGNOSIS — Z79.01 LONG TERM (CURRENT) USE OF ANTICOAGULANTS: ICD-10-CM

## 2024-11-15 LAB
POC INR: 2.4
PROTHROMBIN TIME, POC: ABNORMAL

## 2024-11-15 NOTE — PATIENT INSTRUCTIONS
Reminder: Please contact the Coumadin Clinic at [phone] when you have medication changes. Examples, new medications, antibiotics, discontinued medications, new supplements, missed doses of warfarin or if you took extra doses of warfarin.  This also includes OTC medications. Notifying us helps reduce the possibility of high and low INR's. In addition, if warfarin needs to be held for any procedures, please have surgeon or physician's office contact us before holding anticoagulant. Thanks, [facility].       Please go to the Emergency Department if you experience:  - Extreme shortness of breath or chest pain  - Red, warm, painful, swollen limb  - Numbness or tingling on one side of the body  - Slurred speech or major vision change  - Extreme headache

## 2024-11-15 NOTE — PROGRESS NOTES
Warfarin tablet strength and weekly dosing schedule confirmed today. Patient knows of no reason for subtherapeutic INR result. One time dose of 5 mg tomorrow instead of 2.5 mg. Then continue current maintenance plan (see Anticoag Dosing Calendar). INR to be rechecked in two week(s).

## 2024-12-06 ENCOUNTER — ANTI-COAG VISIT (OUTPATIENT)
Age: 79
End: 2024-12-06

## 2024-12-06 DIAGNOSIS — Z79.01 LONG TERM (CURRENT) USE OF ANTICOAGULANTS: ICD-10-CM

## 2024-12-06 DIAGNOSIS — Z95.3 HISTORY OF AORTIC VALVE REPLACEMENT WITH BIOPROSTHETIC VALVE: ICD-10-CM

## 2024-12-06 DIAGNOSIS — I48.21 PERMANENT ATRIAL FIBRILLATION (HCC): ICD-10-CM

## 2024-12-06 DIAGNOSIS — Z95.2 HISTORY OF MITRAL VALVE REPLACEMENT WITH MECHANICAL VALVE: Primary | ICD-10-CM

## 2024-12-06 LAB
POC INR: 2.8
PROTHROMBIN TIME, POC: NORMAL

## 2024-12-06 NOTE — PATIENT INSTRUCTIONS
Reminder: Please contact the [facility]madin Clinic at [phone] when you have medication changes. Examples, new medications, antibiotics, discontinued medications, new supplements, missed doses of warfarin or if you took extra doses of warfarin.  This also includes OTC medications. Notifying us helps reduce the possibility of high and low INR's. In addition, if warfarin needs to be held for any procedures, please have surgeon or physician's office contact us before holding anticoagulant. Thanks, [facility] Cou[facility]in [facility].       Please go to the Emergency Department if you experience:  - Extreme shortness of breath or chest pain  - Red, warm, painful, swollen limb  - Numbness or tingling on one side of the body  - Slurred speech or major vision change  - Extreme headache

## 2025-01-03 ENCOUNTER — ANTI-COAG VISIT (OUTPATIENT)
Age: 80
End: 2025-01-03

## 2025-01-03 DIAGNOSIS — Z95.2 HISTORY OF MITRAL VALVE REPLACEMENT WITH MECHANICAL VALVE: Primary | ICD-10-CM

## 2025-01-03 DIAGNOSIS — I48.21 PERMANENT ATRIAL FIBRILLATION (HCC): ICD-10-CM

## 2025-01-03 DIAGNOSIS — Z95.3 HISTORY OF AORTIC VALVE REPLACEMENT WITH BIOPROSTHETIC VALVE: ICD-10-CM

## 2025-01-03 DIAGNOSIS — Z79.01 LONG TERM (CURRENT) USE OF ANTICOAGULANTS: ICD-10-CM

## 2025-01-03 LAB
POC INR: 2.8
PROTHROMBIN TIME, POC: NORMAL

## 2025-01-31 ENCOUNTER — ANTI-COAG VISIT (OUTPATIENT)
Age: 80
End: 2025-01-31
Payer: MEDICARE

## 2025-01-31 DIAGNOSIS — Z79.01 LONG TERM (CURRENT) USE OF ANTICOAGULANTS: ICD-10-CM

## 2025-01-31 DIAGNOSIS — Z95.3 HISTORY OF AORTIC VALVE REPLACEMENT WITH BIOPROSTHETIC VALVE: ICD-10-CM

## 2025-01-31 DIAGNOSIS — I48.21 PERMANENT ATRIAL FIBRILLATION (HCC): ICD-10-CM

## 2025-01-31 DIAGNOSIS — Z95.2 HISTORY OF MITRAL VALVE REPLACEMENT WITH MECHANICAL VALVE: Primary | ICD-10-CM

## 2025-01-31 LAB
POC INR: 3.7
PROTHROMBIN TIME, POC: ABNORMAL

## 2025-01-31 PROCEDURE — 85610 PROTHROMBIN TIME: CPT | Performed by: INTERNAL MEDICINE

## 2025-01-31 PROCEDURE — 93793 ANTICOAG MGMT PT WARFARIN: CPT | Performed by: INTERNAL MEDICINE

## 2025-01-31 NOTE — PATIENT INSTRUCTIONS
Reminder: Please contact the [facility]madin Clinic at [phone] when you have medication changes. Examples, new medications, antibiotics, discontinued medications, new supplements, missed doses of warfarin or if you took extra doses of warfarin.  This also includes OTC medications. Notifying us helps reduce the possibility of high and low INR's. In addition, if warfarin needs to be held for any procedures, please have surgeon or physician's office contact us before holding anticoagulant. Thanks, [facility] Coumad[facility].       Please go to the Emergency Department if you experience:  - Extreme shortness of breath or chest pain  - Red, warm, painful, swollen limb  - Numbness or tingling on one side of the body  - Slurred speech or major vision change  - Extreme headache

## 2025-02-06 ENCOUNTER — HOSPITAL ENCOUNTER (EMERGENCY)
Age: 80
Discharge: ANOTHER HOSPITAL | End: 2025-02-06
Payer: MEDICARE

## 2025-02-06 ENCOUNTER — TELEPHONE (OUTPATIENT)
Dept: INTERNAL MEDICINE CLINIC | Facility: CLINIC | Age: 80
End: 2025-02-06

## 2025-02-06 ENCOUNTER — APPOINTMENT (OUTPATIENT)
Dept: GENERAL RADIOLOGY | Age: 80
End: 2025-02-06
Payer: MEDICARE

## 2025-02-06 ENCOUNTER — HOSPITAL ENCOUNTER (OUTPATIENT)
Age: 80
Setting detail: OBSERVATION
Discharge: HOME | End: 2025-02-08
Attending: FAMILY MEDICINE | Admitting: FAMILY MEDICINE
Payer: MEDICARE

## 2025-02-06 VITALS
SYSTOLIC BLOOD PRESSURE: 128 MMHG | HEART RATE: 78 BPM | BODY MASS INDEX: 29.89 KG/M2 | HEIGHT: 66 IN | WEIGHT: 186 LBS | RESPIRATION RATE: 20 BRPM | OXYGEN SATURATION: 97 % | TEMPERATURE: 99.1 F | DIASTOLIC BLOOD PRESSURE: 62 MMHG

## 2025-02-06 DIAGNOSIS — R06.02 SHORTNESS OF BREATH: ICD-10-CM

## 2025-02-06 DIAGNOSIS — I50.9 ACUTE ON CHRONIC CONGESTIVE HEART FAILURE, UNSPECIFIED HEART FAILURE TYPE (HCC): Primary | ICD-10-CM

## 2025-02-06 DIAGNOSIS — J10.1 INFLUENZA A: ICD-10-CM

## 2025-02-06 PROBLEM — R06.00 DYSPNEA: Status: ACTIVE | Noted: 2025-02-06

## 2025-02-06 LAB
ALBUMIN SERPL-MCNC: 4.2 G/DL (ref 3.2–4.6)
ALBUMIN/GLOB SERPL: 1.4 (ref 1–1.9)
ALP SERPL-CCNC: 105 U/L (ref 35–104)
ALT SERPL-CCNC: 13 U/L (ref 12–65)
ANION GAP SERPL CALC-SCNC: 8 MMOL/L (ref 7–16)
AST SERPL-CCNC: 25 U/L (ref 15–37)
BASOPHILS # BLD: 0.01 K/UL (ref 0–0.2)
BASOPHILS NFR BLD: 0.2 % (ref 0–2)
BILIRUB SERPL-MCNC: 0.7 MG/DL (ref 0–1.2)
BUN SERPL-MCNC: 18 MG/DL (ref 8–23)
CALCIUM SERPL-MCNC: 9.1 MG/DL (ref 8.8–10.2)
CHLORIDE SERPL-SCNC: 104 MMOL/L (ref 98–107)
CO2 SERPL-SCNC: 26 MMOL/L (ref 20–29)
CREAT SERPL-MCNC: 0.76 MG/DL (ref 0.8–1.3)
DIFFERENTIAL METHOD BLD: ABNORMAL
EOSINOPHIL # BLD: 0.02 K/UL (ref 0–0.8)
EOSINOPHIL NFR BLD: 0.3 % (ref 0.5–7.8)
ERYTHROCYTE [DISTWIDTH] IN BLOOD BY AUTOMATED COUNT: 12.9 % (ref 11.9–14.6)
FLUAV RNA SPEC QL NAA+PROBE: DETECTED
FLUBV RNA SPEC QL NAA+PROBE: NOT DETECTED
GLOBULIN SER CALC-MCNC: 3 G/DL (ref 2.3–3.5)
GLUCOSE SERPL-MCNC: 114 MG/DL (ref 65–100)
HCT VFR BLD AUTO: 30.7 % (ref 35.8–46.3)
HGB BLD-MCNC: 10.1 G/DL (ref 11.7–15.4)
IMM GRANULOCYTES # BLD AUTO: 0.01 K/UL (ref 0–0.5)
IMM GRANULOCYTES NFR BLD AUTO: 0.2 % (ref 0–5)
INR PPP: 2.5
LYMPHOCYTES # BLD: 0.46 K/UL (ref 0.5–4.6)
LYMPHOCYTES NFR BLD: 7.7 % (ref 13–44)
MAGNESIUM SERPL-MCNC: 2.1 MG/DL (ref 1.8–2.4)
MCH RBC QN AUTO: 30.1 PG (ref 26.1–32.9)
MCHC RBC AUTO-ENTMCNC: 32.9 G/DL (ref 31.4–35)
MCV RBC AUTO: 91.6 FL (ref 82–102)
MONOCYTES # BLD: 0.55 K/UL (ref 0.1–1.3)
MONOCYTES NFR BLD: 9.2 % (ref 4–12)
NEUTS SEG # BLD: 4.96 K/UL (ref 1.7–8.2)
NEUTS SEG NFR BLD: 82.4 % (ref 43–78)
NRBC # BLD: 0 K/UL (ref 0–0.2)
NT PRO BNP: 3299 PG/ML (ref 0–450)
PLATELET # BLD AUTO: 135 K/UL (ref 150–450)
PMV BLD AUTO: 9.4 FL (ref 9.4–12.3)
POTASSIUM SERPL-SCNC: 4 MMOL/L (ref 3.5–5.1)
PROT SERPL-MCNC: 7.2 G/DL (ref 6.3–8.2)
PROTHROMBIN TIME: 27.6 SEC (ref 11.3–14.9)
RBC # BLD AUTO: 3.35 M/UL (ref 4.05–5.2)
SARS-COV-2 RDRP RESP QL NAA+PROBE: NOT DETECTED
SODIUM SERPL-SCNC: 138 MMOL/L (ref 133–143)
SOURCE: NORMAL
TROPONIN T SERPL HS-MCNC: 15.1 NG/L (ref 0–14)
TROPONIN T SERPL HS-MCNC: 15.5 NG/L (ref 0–14)
WBC # BLD AUTO: 6 K/UL (ref 4.3–11.1)

## 2025-02-06 PROCEDURE — G0378 HOSPITAL OBSERVATION PER HR: HCPCS

## 2025-02-06 PROCEDURE — 83735 ASSAY OF MAGNESIUM: CPT

## 2025-02-06 PROCEDURE — 80053 COMPREHEN METABOLIC PANEL: CPT

## 2025-02-06 PROCEDURE — 94640 AIRWAY INHALATION TREATMENT: CPT

## 2025-02-06 PROCEDURE — 36415 COLL VENOUS BLD VENIPUNCTURE: CPT

## 2025-02-06 PROCEDURE — 84484 ASSAY OF TROPONIN QUANT: CPT

## 2025-02-06 PROCEDURE — 96374 THER/PROPH/DIAG INJ IV PUSH: CPT

## 2025-02-06 PROCEDURE — 87635 SARS-COV-2 COVID-19 AMP PRB: CPT

## 2025-02-06 PROCEDURE — 85025 COMPLETE CBC W/AUTO DIFF WBC: CPT

## 2025-02-06 PROCEDURE — 94760 N-INVAS EAR/PLS OXIMETRY 1: CPT

## 2025-02-06 PROCEDURE — 83880 ASSAY OF NATRIURETIC PEPTIDE: CPT

## 2025-02-06 PROCEDURE — G0379 DIRECT REFER HOSPITAL OBSERV: HCPCS

## 2025-02-06 PROCEDURE — 6360000002 HC RX W HCPCS: Performed by: PHYSICIAN ASSISTANT

## 2025-02-06 PROCEDURE — 99285 EMERGENCY DEPT VISIT HI MDM: CPT

## 2025-02-06 PROCEDURE — 93005 ELECTROCARDIOGRAM TRACING: CPT | Performed by: PHYSICIAN ASSISTANT

## 2025-02-06 PROCEDURE — 87502 INFLUENZA DNA AMP PROBE: CPT

## 2025-02-06 PROCEDURE — 2500000003 HC RX 250 WO HCPCS: Performed by: FAMILY MEDICINE

## 2025-02-06 PROCEDURE — 85610 PROTHROMBIN TIME: CPT

## 2025-02-06 PROCEDURE — 6370000000 HC RX 637 (ALT 250 FOR IP): Performed by: PHYSICIAN ASSISTANT

## 2025-02-06 PROCEDURE — 6370000000 HC RX 637 (ALT 250 FOR IP): Performed by: FAMILY MEDICINE

## 2025-02-06 PROCEDURE — 71045 X-RAY EXAM CHEST 1 VIEW: CPT

## 2025-02-06 RX ORDER — POLYETHYLENE GLYCOL 3350 17 G/17G
17 POWDER, FOR SOLUTION ORAL DAILY PRN
Status: DISCONTINUED | OUTPATIENT
Start: 2025-02-06 | End: 2025-02-08 | Stop reason: HOSPADM

## 2025-02-06 RX ORDER — FUROSEMIDE 10 MG/ML
40 INJECTION INTRAMUSCULAR; INTRAVENOUS DAILY
Status: DISCONTINUED | OUTPATIENT
Start: 2025-02-07 | End: 2025-02-07

## 2025-02-06 RX ORDER — OSELTAMIVIR PHOSPHATE 75 MG/1
75 CAPSULE ORAL 2 TIMES DAILY
Status: DISCONTINUED | OUTPATIENT
Start: 2025-02-07 | End: 2025-02-07

## 2025-02-06 RX ORDER — MAGNESIUM SULFATE IN WATER 40 MG/ML
2000 INJECTION, SOLUTION INTRAVENOUS PRN
Status: DISCONTINUED | OUTPATIENT
Start: 2025-02-06 | End: 2025-02-08 | Stop reason: HOSPADM

## 2025-02-06 RX ORDER — FUROSEMIDE 10 MG/ML
20 INJECTION INTRAMUSCULAR; INTRAVENOUS ONCE
Status: COMPLETED | OUTPATIENT
Start: 2025-02-06 | End: 2025-02-06

## 2025-02-06 RX ORDER — POTASSIUM CHLORIDE 7.45 MG/ML
10 INJECTION INTRAVENOUS PRN
Status: DISCONTINUED | OUTPATIENT
Start: 2025-02-06 | End: 2025-02-08 | Stop reason: HOSPADM

## 2025-02-06 RX ORDER — ROSUVASTATIN CALCIUM 5 MG/1
5 TABLET, COATED ORAL DAILY
Status: DISCONTINUED | OUTPATIENT
Start: 2025-02-07 | End: 2025-02-08 | Stop reason: HOSPADM

## 2025-02-06 RX ORDER — IPRATROPIUM BROMIDE AND ALBUTEROL SULFATE 2.5; .5 MG/3ML; MG/3ML
1 SOLUTION RESPIRATORY (INHALATION)
Status: COMPLETED | OUTPATIENT
Start: 2025-02-06 | End: 2025-02-06

## 2025-02-06 RX ORDER — SODIUM CHLORIDE 0.9 % (FLUSH) 0.9 %
5-40 SYRINGE (ML) INJECTION PRN
Status: DISCONTINUED | OUTPATIENT
Start: 2025-02-06 | End: 2025-02-08 | Stop reason: HOSPADM

## 2025-02-06 RX ORDER — CARVEDILOL 3.12 MG/1
3.12 TABLET ORAL 2 TIMES DAILY WITH MEALS
Status: DISCONTINUED | OUTPATIENT
Start: 2025-02-06 | End: 2025-02-08 | Stop reason: HOSPADM

## 2025-02-06 RX ORDER — ASPIRIN 81 MG/1
81 TABLET ORAL DAILY
Status: DISCONTINUED | OUTPATIENT
Start: 2025-02-07 | End: 2025-02-08 | Stop reason: HOSPADM

## 2025-02-06 RX ORDER — ONDANSETRON 2 MG/ML
4 INJECTION INTRAMUSCULAR; INTRAVENOUS EVERY 6 HOURS PRN
Status: DISCONTINUED | OUTPATIENT
Start: 2025-02-06 | End: 2025-02-08 | Stop reason: HOSPADM

## 2025-02-06 RX ORDER — GUAIFENESIN 200 MG/10ML
200 LIQUID ORAL EVERY 4 HOURS PRN
Status: DISCONTINUED | OUTPATIENT
Start: 2025-02-06 | End: 2025-02-08 | Stop reason: HOSPADM

## 2025-02-06 RX ORDER — POTASSIUM CHLORIDE 750 MG/1
10 TABLET, EXTENDED RELEASE ORAL DAILY
Status: DISCONTINUED | OUTPATIENT
Start: 2025-02-07 | End: 2025-02-08 | Stop reason: HOSPADM

## 2025-02-06 RX ORDER — ONDANSETRON 4 MG/1
4 TABLET, ORALLY DISINTEGRATING ORAL EVERY 8 HOURS PRN
Status: DISCONTINUED | OUTPATIENT
Start: 2025-02-06 | End: 2025-02-08 | Stop reason: HOSPADM

## 2025-02-06 RX ORDER — WARFARIN SODIUM 5 MG/1
2.5 TABLET ORAL DAILY
Status: DISCONTINUED | OUTPATIENT
Start: 2025-02-07 | End: 2025-02-07

## 2025-02-06 RX ORDER — SODIUM CHLORIDE 0.9 % (FLUSH) 0.9 %
5-40 SYRINGE (ML) INJECTION EVERY 12 HOURS SCHEDULED
Status: DISCONTINUED | OUTPATIENT
Start: 2025-02-06 | End: 2025-02-08 | Stop reason: HOSPADM

## 2025-02-06 RX ORDER — SODIUM CHLORIDE 9 MG/ML
INJECTION, SOLUTION INTRAVENOUS PRN
Status: DISCONTINUED | OUTPATIENT
Start: 2025-02-06 | End: 2025-02-08 | Stop reason: HOSPADM

## 2025-02-06 RX ORDER — ACETAMINOPHEN 325 MG/1
650 TABLET ORAL
Status: COMPLETED | OUTPATIENT
Start: 2025-02-06 | End: 2025-02-06

## 2025-02-06 RX ORDER — ACETAMINOPHEN 325 MG/1
650 TABLET ORAL EVERY 6 HOURS PRN
Status: DISCONTINUED | OUTPATIENT
Start: 2025-02-06 | End: 2025-02-08 | Stop reason: HOSPADM

## 2025-02-06 RX ORDER — LEVOTHYROXINE SODIUM 50 UG/1
50 TABLET ORAL DAILY
Status: DISCONTINUED | OUTPATIENT
Start: 2025-02-07 | End: 2025-02-08 | Stop reason: HOSPADM

## 2025-02-06 RX ORDER — ACETAMINOPHEN 650 MG/1
650 SUPPOSITORY RECTAL EVERY 6 HOURS PRN
Status: DISCONTINUED | OUTPATIENT
Start: 2025-02-06 | End: 2025-02-08 | Stop reason: HOSPADM

## 2025-02-06 RX ORDER — POTASSIUM CHLORIDE 1500 MG/1
40 TABLET, EXTENDED RELEASE ORAL PRN
Status: DISCONTINUED | OUTPATIENT
Start: 2025-02-06 | End: 2025-02-08 | Stop reason: HOSPADM

## 2025-02-06 RX ADMIN — CEPHALEXIN 250 MG: 250 CAPSULE ORAL at 23:02

## 2025-02-06 RX ADMIN — ACETAMINOPHEN 650 MG: 325 TABLET ORAL at 20:09

## 2025-02-06 RX ADMIN — SODIUM CHLORIDE, PRESERVATIVE FREE 10 ML: 5 INJECTION INTRAVENOUS at 23:03

## 2025-02-06 RX ADMIN — CARVEDILOL 3.12 MG: 3.12 TABLET, FILM COATED ORAL at 23:02

## 2025-02-06 RX ADMIN — IPRATROPIUM BROMIDE AND ALBUTEROL SULFATE 1 DOSE: 2.5; .5 SOLUTION RESPIRATORY (INHALATION) at 17:30

## 2025-02-06 RX ADMIN — FUROSEMIDE 20 MG: 10 INJECTION, SOLUTION INTRAMUSCULAR; INTRAVENOUS at 20:12

## 2025-02-06 ASSESSMENT — PAIN - FUNCTIONAL ASSESSMENT
PAIN_FUNCTIONAL_ASSESSMENT: NONE - DENIES PAIN
PAIN_FUNCTIONAL_ASSESSMENT: 0-10

## 2025-02-06 ASSESSMENT — PAIN SCALES - GENERAL
PAINLEVEL_OUTOF10: 5
PAINLEVEL_OUTOF10: 0

## 2025-02-06 NOTE — TELEPHONE ENCOUNTER
Pt called with c/o cough, congestion, chills, shortness of breath and 02 sat 91%, Pt is requesting medication. I did explain to pt that she needs to go on to the ER or Urgent Care for an evaluation. Dr Villegas does not feel with her symptoms that she needs to wait until tomorrow to be seen. Pt voiced understanding.

## 2025-02-06 NOTE — ED PROVIDER NOTES
here because her oxygen saturation was between 90 and 93%.  Patient denies any history of asthma or COPD.  Patient is not a smoker.    The history is provided by the patient and a relative.     Physical Exam     Vitals signs and nursing note reviewed:  Vitals:    02/06/25 2000 02/06/25 2012 02/06/25 2015 02/06/25 2030   BP: (!) 138/55 (!) 138/55 (!) 141/55 (!) 126/53   Pulse: 73 71 72 72   Resp: 29 21 28 25   Temp:    99.7 °F (37.6 °C)   TempSrc:    Oral   SpO2: 97% 98% 99% 96%   Weight:       Height:          Physical Exam  Vitals and nursing note reviewed.   Constitutional:       General: She is not in acute distress.     Appearance: Normal appearance. She is not toxic-appearing.   HENT:      Head: Normocephalic and atraumatic.      Nose: Nose normal.      Mouth/Throat:      Mouth: Mucous membranes are moist.      Pharynx: Oropharynx is clear.   Eyes:      Conjunctiva/sclera: Conjunctivae normal.      Pupils: Pupils are equal, round, and reactive to light.   Cardiovascular:      Rate and Rhythm: Normal rate and regular rhythm.      Heart sounds: Normal heart sounds.   Pulmonary:      Effort: Pulmonary effort is normal. No respiratory distress.      Breath sounds: No stridor. Wheezing present. No rhonchi or rales.      Comments: Mild expiratory wheeze bilaterally in the bases.  Abdominal:      General: Bowel sounds are normal. There is no distension.      Palpations: Abdomen is soft.      Tenderness: There is no abdominal tenderness. There is no guarding.   Musculoskeletal:         General: No swelling. Normal range of motion.      Cervical back: Normal range of motion and neck supple. No rigidity.   Skin:     General: Skin is warm and dry.      Findings: No rash.   Neurological:      General: No focal deficit present.      Mental Status: She is alert and oriented to person, place, and time.      Motor: No weakness.      Gait: Gait normal.        Procedures     Procedures    Orders Placed This Encounter  appear to be stable since previously. Correlate.         Electronically signed by Manuel Hernandez [facility]     02/06/25  1639   COVID19 Not detected        Voice dictation software was used during the making of this note.  This software is not perfect and grammatical and other typographical errors may be present.  This note has not been completely proofread for errors.     Erica Horne PA-C  02/06/25 1422

## 2025-02-06 NOTE — ED TRIAGE NOTES
PT ambulatory to triage with steady gait, daughter by side. Pt nasal congestion, chest congestion, chills, sinus headache and body aches. Symptom onset yesterday. PT sent by pcp for SPO2 sat of 91-93%

## 2025-02-07 LAB
ANION GAP SERPL CALC-SCNC: 9 MMOL/L (ref 7–16)
BASOPHILS # BLD: 0.02 K/UL (ref 0–0.2)
BASOPHILS NFR BLD: 0.4 % (ref 0–2)
BUN SERPL-MCNC: 17 MG/DL (ref 8–23)
CALCIUM SERPL-MCNC: 8.7 MG/DL (ref 8.8–10.2)
CHLORIDE SERPL-SCNC: 104 MMOL/L (ref 98–107)
CO2 SERPL-SCNC: 27 MMOL/L (ref 20–29)
CREAT SERPL-MCNC: 0.87 MG/DL (ref 0.6–1.1)
DIFFERENTIAL METHOD BLD: ABNORMAL
EKG ATRIAL RATE: 0 BPM
EKG DIAGNOSIS: NORMAL
EKG P AXIS: -80 DEGREES
EKG P-R INTERVAL: 351 MS
EKG Q-T INTERVAL: 479 MS
EKG QRS DURATION: 144 MS
EKG QTC CALCULATION (BAZETT): 528 MS
EKG R AXIS: 122 DEGREES
EKG T AXIS: 64 DEGREES
EKG VENTRICULAR RATE: 73 BPM
EOSINOPHIL # BLD: 0.01 K/UL (ref 0–0.8)
EOSINOPHIL NFR BLD: 0.2 % (ref 0.5–7.8)
ERYTHROCYTE [DISTWIDTH] IN BLOOD BY AUTOMATED COUNT: 13 % (ref 11.9–14.6)
GLUCOSE SERPL-MCNC: 103 MG/DL (ref 70–99)
HCT VFR BLD AUTO: 30.2 % (ref 35.8–46.3)
HGB BLD-MCNC: 10 G/DL (ref 11.7–15.4)
IMM GRANULOCYTES # BLD AUTO: 0.02 K/UL (ref 0–0.5)
IMM GRANULOCYTES NFR BLD AUTO: 0.4 % (ref 0–5)
INR PPP: 2.5
LYMPHOCYTES # BLD: 0.45 K/UL (ref 0.5–4.6)
LYMPHOCYTES NFR BLD: 8.2 % (ref 13–44)
MCH RBC QN AUTO: 30.4 PG (ref 26.1–32.9)
MCHC RBC AUTO-ENTMCNC: 33.1 G/DL (ref 31.4–35)
MCV RBC AUTO: 91.8 FL (ref 82–102)
MONOCYTES # BLD: 0.64 K/UL (ref 0.1–1.3)
MONOCYTES NFR BLD: 11.6 % (ref 4–12)
NEUTS SEG # BLD: 4.36 K/UL (ref 1.7–8.2)
NEUTS SEG NFR BLD: 79.2 % (ref 43–78)
NRBC # BLD: 0 K/UL (ref 0–0.2)
PLATELET # BLD AUTO: 136 K/UL (ref 150–450)
PMV BLD AUTO: 10.2 FL (ref 9.4–12.3)
POTASSIUM SERPL-SCNC: 3.9 MMOL/L (ref 3.5–5.1)
PROTHROMBIN TIME: 27.4 SEC (ref 11.3–14.9)
RBC # BLD AUTO: 3.29 M/UL (ref 4.05–5.2)
SODIUM SERPL-SCNC: 140 MMOL/L (ref 136–145)
WBC # BLD AUTO: 5.5 K/UL (ref 4.3–11.1)

## 2025-02-07 PROCEDURE — 96376 TX/PRO/DX INJ SAME DRUG ADON: CPT

## 2025-02-07 PROCEDURE — 36415 COLL VENOUS BLD VENIPUNCTURE: CPT

## 2025-02-07 PROCEDURE — 2700000000 HC OXYGEN THERAPY PER DAY

## 2025-02-07 PROCEDURE — 94760 N-INVAS EAR/PLS OXIMETRY 1: CPT

## 2025-02-07 PROCEDURE — G0378 HOSPITAL OBSERVATION PER HR: HCPCS

## 2025-02-07 PROCEDURE — 97530 THERAPEUTIC ACTIVITIES: CPT

## 2025-02-07 PROCEDURE — 6370000000 HC RX 637 (ALT 250 FOR IP): Performed by: FAMILY MEDICINE

## 2025-02-07 PROCEDURE — 93010 ELECTROCARDIOGRAM REPORT: CPT | Performed by: INTERNAL MEDICINE

## 2025-02-07 PROCEDURE — 85025 COMPLETE CBC W/AUTO DIFF WBC: CPT

## 2025-02-07 PROCEDURE — 2500000003 HC RX 250 WO HCPCS: Performed by: FAMILY MEDICINE

## 2025-02-07 PROCEDURE — 6360000002 HC RX W HCPCS: Performed by: PHYSICIAN ASSISTANT

## 2025-02-07 PROCEDURE — 85610 PROTHROMBIN TIME: CPT

## 2025-02-07 PROCEDURE — 6370000000 HC RX 637 (ALT 250 FOR IP)

## 2025-02-07 PROCEDURE — 80048 BASIC METABOLIC PNL TOTAL CA: CPT

## 2025-02-07 PROCEDURE — 6370000000 HC RX 637 (ALT 250 FOR IP): Performed by: PHYSICIAN ASSISTANT

## 2025-02-07 PROCEDURE — 97161 PT EVAL LOW COMPLEX 20 MIN: CPT

## 2025-02-07 PROCEDURE — 96374 THER/PROPH/DIAG INJ IV PUSH: CPT

## 2025-02-07 PROCEDURE — 6360000002 HC RX W HCPCS: Performed by: FAMILY MEDICINE

## 2025-02-07 PROCEDURE — 97165 OT EVAL LOW COMPLEX 30 MIN: CPT

## 2025-02-07 RX ORDER — FUROSEMIDE 10 MG/ML
40 INJECTION INTRAMUSCULAR; INTRAVENOUS 2 TIMES DAILY
Status: DISCONTINUED | OUTPATIENT
Start: 2025-02-07 | End: 2025-02-08 | Stop reason: HOSPADM

## 2025-02-07 RX ORDER — WARFARIN SODIUM 5 MG/1
5 TABLET ORAL
Status: DISCONTINUED | OUTPATIENT
Start: 2025-02-07 | End: 2025-02-07

## 2025-02-07 RX ORDER — WARFARIN SODIUM 5 MG/1
5 TABLET ORAL
Status: DISCONTINUED | OUTPATIENT
Start: 2025-02-08 | End: 2025-02-07

## 2025-02-07 RX ORDER — WARFARIN SODIUM 5 MG/1
2.5 TABLET ORAL
Status: DISCONTINUED | OUTPATIENT
Start: 2025-02-07 | End: 2025-02-07

## 2025-02-07 RX ORDER — GUAIFENESIN 600 MG/1
600 TABLET, EXTENDED RELEASE ORAL 2 TIMES DAILY
Qty: 14 TABLET | Refills: 0 | Status: SHIPPED | OUTPATIENT
Start: 2025-02-07 | End: 2025-02-14

## 2025-02-07 RX ORDER — WARFARIN SODIUM 5 MG/1
5 TABLET ORAL WEEKLY
Status: DISCONTINUED | OUTPATIENT
Start: 2025-02-07 | End: 2025-02-07

## 2025-02-07 RX ORDER — DEXTROMETHORPHAN HBR. AND GUAIFENESIN 10; 100 MG/5ML; MG/5ML
10 SOLUTION ORAL EVERY 4 HOURS PRN
Qty: 118 ML | COMMUNITY
Start: 2025-02-07 | End: 2025-02-12

## 2025-02-07 RX ORDER — OSELTAMIVIR PHOSPHATE 30 MG/1
30 CAPSULE ORAL 2 TIMES DAILY
Qty: 9 CAPSULE | Refills: 0 | Status: SHIPPED | OUTPATIENT
Start: 2025-02-07 | End: 2025-02-12

## 2025-02-07 RX ORDER — WARFARIN SODIUM 5 MG/1
5 TABLET ORAL WEEKLY
Status: DISCONTINUED | OUTPATIENT
Start: 2025-02-07 | End: 2025-02-08 | Stop reason: HOSPADM

## 2025-02-07 RX ORDER — WARFARIN SODIUM 5 MG/1
2.5 TABLET ORAL
Status: DISCONTINUED | OUTPATIENT
Start: 2025-02-08 | End: 2025-02-08 | Stop reason: HOSPADM

## 2025-02-07 RX ORDER — OSELTAMIVIR PHOSPHATE 30 MG/1
30 CAPSULE ORAL 2 TIMES DAILY
Status: DISCONTINUED | OUTPATIENT
Start: 2025-02-07 | End: 2025-02-08 | Stop reason: HOSPADM

## 2025-02-07 RX ADMIN — OSELTAMIVIR 75 MG: 75 CAPSULE ORAL at 05:55

## 2025-02-07 RX ADMIN — FUROSEMIDE 40 MG: 10 INJECTION, SOLUTION INTRAMUSCULAR; INTRAVENOUS at 09:11

## 2025-02-07 RX ADMIN — LEVOTHYROXINE SODIUM 50 MCG: 0.05 TABLET ORAL at 05:55

## 2025-02-07 RX ADMIN — FUROSEMIDE 40 MG: 10 INJECTION, SOLUTION INTRAMUSCULAR; INTRAVENOUS at 18:55

## 2025-02-07 RX ADMIN — WARFARIN SODIUM 5 MG: 5 TABLET ORAL at 18:55

## 2025-02-07 RX ADMIN — OSELTAMIVIR PHOSPHATE 30 MG: 30 CAPSULE ORAL at 20:53

## 2025-02-07 RX ADMIN — CEPHALEXIN 250 MG: 250 CAPSULE ORAL at 05:55

## 2025-02-07 RX ADMIN — ASPIRIN 81 MG: 81 TABLET, COATED ORAL at 09:11

## 2025-02-07 RX ADMIN — Medication 3 MG: at 20:53

## 2025-02-07 RX ADMIN — CEPHALEXIN 250 MG: 250 CAPSULE ORAL at 18:55

## 2025-02-07 RX ADMIN — WARFARIN SODIUM 2.5 MG: 5 TABLET ORAL at 01:24

## 2025-02-07 RX ADMIN — CARVEDILOL 3.12 MG: 3.12 TABLET, FILM COATED ORAL at 09:11

## 2025-02-07 RX ADMIN — ROSUVASTATIN CALCIUM 5 MG: 5 TABLET, FILM COATED ORAL at 09:10

## 2025-02-07 RX ADMIN — POTASSIUM CHLORIDE 10 MEQ: 750 TABLET, EXTENDED RELEASE ORAL at 09:11

## 2025-02-07 RX ADMIN — CEPHALEXIN 250 MG: 250 CAPSULE ORAL at 13:32

## 2025-02-07 RX ADMIN — ACETAMINOPHEN 650 MG: 325 TABLET, FILM COATED ORAL at 20:53

## 2025-02-07 RX ADMIN — ACETAMINOPHEN 650 MG: 325 TABLET, FILM COATED ORAL at 15:00

## 2025-02-07 RX ADMIN — CARVEDILOL 3.12 MG: 3.12 TABLET, FILM COATED ORAL at 18:55

## 2025-02-07 RX ADMIN — CEPHALEXIN 250 MG: 250 CAPSULE ORAL at 23:52

## 2025-02-07 RX ADMIN — SODIUM CHLORIDE, PRESERVATIVE FREE 10 ML: 5 INJECTION INTRAVENOUS at 19:51

## 2025-02-07 ASSESSMENT — PAIN SCALES - GENERAL: PAINLEVEL_OUTOF10: 2

## 2025-02-07 ASSESSMENT — PAIN DESCRIPTION - LOCATION: LOCATION: HEAD

## 2025-02-07 ASSESSMENT — PAIN SCALES - WONG BAKER: WONGBAKER_NUMERICALRESPONSE: NO HURT

## 2025-02-07 ASSESSMENT — PAIN - FUNCTIONAL ASSESSMENT: PAIN_FUNCTIONAL_ASSESSMENT: ACTIVITIES ARE NOT PREVENTED

## 2025-02-07 ASSESSMENT — PAIN DESCRIPTION - DESCRIPTORS: DESCRIPTORS: ACHING

## 2025-02-07 NOTE — ED NOTES
TRANSFER - OUT REPORT:    Verbal report given to Dirk HERNANDEZ on Jayashree Morel  being transferred to [facility] for routine progression of patient care       Report consisted of patient's Situation, Background, Assessment and   Recommendations(SBAR).     Information from the following report(s) Nurse Handoff Report was reviewed with the receiving nurse.    Lines:   Peripheral IV 02/06/25 Right Antecubital (Active)   Site Assessment Clean, dry & intact 02/06/25 1732   Line Status Blood return noted;Brisk blood return;Normal saline locked;Specimen collected 02/06/25 1732   Phlebitis Assessment No symptoms 02/06/25 1732   Infiltration Assessment 0 02/06/25 1732   Dressing Status New dressing applied 02/06/25 1732   Dressing Type Transparent 02/06/25 1732   Dressing Intervention New 02/06/25 1732        Opportunity for questions and clarification was provided.      Patient transported with:  Monitor  DALE GODOY

## 2025-02-07 NOTE — ASSESSMENT & PLAN NOTE
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  Consult Pharmacy to monitor INR and dose warfarin

## 2025-02-07 NOTE — DISCHARGE INSTRUCTIONS
Balloon removed intact.  Damaged Weigh yourself daily.  If you gain more than 2 pounds in 24 hours follow-up lower extremity swelling or shortness of breath take an additional 20 mg of Lasix.  If you need to do this 3 times or more in 1 week contact your primary care provider immediately for further instruction

## 2025-02-07 NOTE — ACP (ADVANCE CARE PLANNING)
Advance Care Planning   General Advance Care Planning (ACP) Conversation    Date of Conversation: 2/6/2025  Conducted with: Patient with Decision Making Capacity  Other persons present: None    Healthcare Decision Maker:    Primary Decision Maker: Leana Morel - Child - [phone]    Secondary Decision Maker: Ck Morely - Brother/Sister - [phone]    Supplemental (Other) Decision Maker: Maria TeresaJaylin - Other Relative - [phone]    Today we documented Decision Maker(s) consistent with Legal Next of Kin hierarchy.  Content/Action Overview:  DECLINED ACP Conversation - will revisit periodically    Length of Voluntary ACP Conversation in minutes:  <16 minutes (Non-Billable)    Keron Moon RN

## 2025-02-07 NOTE — CARE COORDINATION
CASE MANAGEMENT ASSESSMENT NOTE    Patient is a 79 year old female with dyspnea.      Patient assessment completed at bedside.  Patient presents to assessment alert and oriented, and answers questions appropriately.  She lives at home with her daughter and grandson.  At baseline, she is independent with transfers, and does not use assistive devices.  She is an active .  Lives in a 2 story home with level entrance.  She is established with PCP, Dr. Nelly Bañuelos.  She has Medicare A and B as well as a [facility] AARP supplement.  She is currently on 3 liters of oxygen, but does not wear o2 at home.  Patient does not anticipate need for PT/OT evals.    At this time, anticipate patient to be discharged home.  Possible need for home oxygen.  PT/OT evals are not anticipated to be needed in this case.  Case management will continue to follow.  Please notify if there are any changes.     Attending Physician: Ricardo Wang DO  Admit Problem: Dyspnea [R06.00]  Date/Time of Admission: 2/6/2025 10:00 PM  Problem List:  Patient Active Problem List   Diagnosis    Chronic bacterial endocarditis    Pacemaker    History of mitral valve replacement with mechanical valve    History of prosthetic heart valve    Cardiac pacemaker    Chronic a-fib (HCC)    Tricuspid valve disorder    History of aortic valve replacement with bioprosthetic valve    Chronic systolic heart failure (HCC)    Hx of bacterial endocarditis    Chronic renal failure    Permanent atrial fibrillation (HCC)    Hypertension    Long term (current) use of anticoagulants    Dyslipidemia    Warfarin anticoagulation    Hypercoagulable state, secondary (HCC)    Dyspnea    Influenza A          02/07/25 0914   Service Assessment   Patient Orientation Alert and Oriented   Cognition Alert   History Provided By Patient   Primary Caregiver Self   Accompanied By/Relationship N/A   Support Systems Family Members   Patient's Healthcare Decision Maker is: Legal Next of  Kin  (Leana Morel)   PCP Verified by CM Yes  (Dr. Nelly Bañuelos)   Last Visit to PCP Within last 3 months   Prior Functional Level Independent in ADLs/IADLs   Current Functional Level Independent in ADLs/IADLs   Can patient return to prior living arrangement Yes   Ability to make needs known: Good   Family able to assist with home care needs: Yes   Would you like for me to discuss the discharge plan with any other family members/significant others, and if so, who? Yes   Financial Resources Medicare;Other (Comment)  ([facility] AARP supplement)   CM/SW Referral Other (see comment)  (N/A at this time.)   Social/Functional History   Lives With Daughter;Other (Comment)  (Grandson)   Home Layout Two level   Home Access Level entry   Bathroom Equipment None   Home Equipment None   Receives Help From Family   Active  Yes   Mode of Transportation Car   Occupation Retired   Discharge Planning   Potential Assistance Needed Durable Medical Equipment   Potential DME Needed Oxygen Therapy (Comment)   DME Ordered? No             Keron Moon RN 02/07/25 9:16 AM

## 2025-02-07 NOTE — ASSESSMENT & PLAN NOTE
- On warfarin for chronic afib and valve replacement  - Consult to Pharmacy for dosing and monitoring of INR

## 2025-02-07 NOTE — PROGRESS NOTES
Hospitalist Progress Note   Admit Date:  2025 10:00 PM   Name:  Jayashree Morel   Age:  79 y.o.  Sex:  female  :  1945   MRN:  [id number]   Room:  [facility]/    Presenting/Chief Complaint: No chief complaint on file.     Reason(s) for Admission: Dyspnea [R06.00]     Hospital Course:   Jayashree Morel is a 79 y.o. female with medical history of afib, CHF, heart valve replacements who presented to [facility] ED with URI symptoms.  Patient has chills, congestions, sinus pressure, and myalgias.  Symptoms started yesterday.  Upon ER evaluation, O2 sats 91-93% on RA, placed on O2 for comfort which improved sats to 99%.  pBNP is 3,299.  Troponins stable at 15.5, 15.1.  WBC is 6.  CXR shows:  IMPRESSION:  Cardiomegaly with pulmonary vascular congestion with additional  findings that appear to be stable since previously. Correlate.  Hospitalist asked to admit.  Patient transferred to [facility] for further care.     The patient was treated with Lasix and Tamiflu.  She was able to be weaned off of oxygen today.  Respiratory therapy walked her and she does not need any oxygen even with ambulation.  She still feels sick but is doing better than last evening.  She denies any nausea, vomiting, fevers, chills, chest pain or shortness of breath.  She feels safe going home she has walked with therapy and did well and has no needs on discharge.          Subjective & 24hr Events:   The patient was satting well with ambulation but does drop to 88 to 89% while laying down.  She is feeling better but still very weak.  Denies any chest pain, shortness of breath, nausea, vomiting, fevers, chills.    Discussion with the patient's daughter, she would feel more comfortable with the patient staying the night and I think the patient could benefit from IV Lasix for a couple more doses prior to discharge.    Expect she will be ready for discharge tomorrow      Assessment & Plan:     Principal  use of anticoagulants    Influenza A  Resolved Problems:    * No resolved hospital problems. *      Objective:   Patient Vitals for the past 24 hrs:   Temp Pulse Resp BP SpO2   02/07/25 1144 99.1 °F (37.3 °C) 70 18 (!) 123/59 96 %   02/07/25 0911 -- 64 -- (!) 126/52 --   02/07/25 0725 98.4 °F (36.9 °C) 74 18 (!) 126/52 96 %   02/07/25 0348 98.9 °F (37.2 °C) 70 20 129/61 94 %   02/06/25 2208 -- 78 20 -- 100 %   02/06/25 2203 99.1 °F (37.3 °C) 74 20 (!) 128/51 100 %       Oxygen Therapy  SpO2: 96 %  Pulse Oximeter Device Mode: Intermittent  O2 Device: Nasal cannula  O2 Flow Rate (L/min): 3 L/min    Estimated body mass index is 30.18 kg/m² as calculated from the following:    Height as of this encounter: 1.676 m (5' 6\").    Weight as of this encounter: 84.8 kg (187 lb).  No intake or output data in the 24 hours ending 02/07/25 1542      Physical Exam:     General:    Well nourished.    Head:  Normocephalic, atraumatic  Eyes:  Sclerae appear normal.  Pupils equally round.  ENT:  Nares appear normal.  Moist oral mucosa  Neck:    Trachea midline   CV:   RRR.  No m/r/g.  No jugular venous distension.  Lungs:   Scattered rales.  Nonlabored breathing.  Symmetric expansion.  On room air.  Abdomen:   Soft, nontender, nondistended.  Extremities: No cyanosis or clubbing.  No edema  Skin:     No rashes.  Normal coloration.   Warm and dry.    Neuro:  CN II-XII grossly intact.    Psych:  Normal mood and affect.      I have personally reviewed labs and tests:  Recent Labs:  Recent Results (from the past 48 hour(s))   Influenza A/B, Molecular    Collection Time: 02/06/25  4:39 PM    Specimen: Nasopharyngeal   Result Value Ref Range    Influenza A, COLETTE Detected (A) NOTD      Influenza B, COLETTE Not detected NOTD     COVID-19, Rapid    Collection Time: 02/06/25  4:39 PM    Specimen: Nasopharyngeal   Result Value Ref Range    Source Nasopharyngeal      SARS-CoV-2, Rapid Not detected NOTD     EKG 12 Lead (SOB)    Collection Time: 02/06/25

## 2025-02-07 NOTE — ASSESSMENT & PLAN NOTE
- Has some pulmonary vascular congestion on CXR and elevated pBNP, however no peripheral edema  - Given IV lasix in ER  - Will continue IV lasix  - Continue home meds otherwise

## 2025-02-07 NOTE — PROGRESS NOTES
ACUTE OCCUPATIONAL THERAPY GOALS:   (Developed with and agreed upon by patient and/or caregiver.)  Pt will be (I) with ADL's and ambulating short distances.     OCCUPATIONAL THERAPY Initial Assessment, Daily Note, and Discharge          Acknowledge Orders  Time  OT Charge Capture  Rehab Caseload Tracker      Jayashree Morel is a 79 y.o. female   PRIMARY DIAGNOSIS: Dyspnea  Dyspnea [R06.00]       Reason for Referral: Generalized Muscle Weakness (M62.81)  Observation: Payor: MEDICARE / Plan: MEDICARE PART A AND B / Product Type: *No Product type* /     ASSESSMENT:     REHAB RECOMMENDATIONS:   Recommendation to date pending progress:  Setting:  No further skilled occupational therapy after discharge from hospital    Equipment:    None     ASSESSMENT:  Ms. Morel was admitted with above diagnosis. Pt seen in room and noted to be alert and oriented x 4. Pt lives with family but is independent with self care, mobility and driving at baseline. Pt is noted today but up in room without difficulty and able to attend to her own self care needs. No further OT warranted.      [facility] AM-PAC™ “6 Clicks” Daily Activity Inpatient Short Form:                SUBJECTIVE:     Ms. Morel states, \"I feel better but still feel like I have the flu\"     Social/Functional Lives With: Daughter, Other (Comment) (Grandson)  Home Layout: Two level  Home Access: Level entry  Bathroom Equipment: None  Home Equipment: None  Receives Help From: Family  Active : Yes  Mode of Transportation: Car  Occupation: Retired    OBJECTIVE:     LINES / DRAINS / AIRWAY: None    RESTRICTIONS/PRECAUTIONS:       PAIN: VITALS / O2:   Pre Treatment:          Post Treatment: no complaint of pain       Vitals          Oxygen  O2 Flow Rate (L/min): 3 L/min         GROSS EVALUATION: INTACT IMPAIRED   (See Comments)   UE AROM [x] []   UE PROM [x] []   Strength [x]       Posture / Balance [x]     Sensation [x]     Coordination [x]       Tone

## 2025-02-07 NOTE — PROGRESS NOTES
02/07/25 1322   Resting (Room Air)   SpO2 89   HR 72   Resting (On O2)   SpO2 96   HR 72   O2 Device Nasal cannula   O2 Flow Rate (l/min) 2 l/min   During Walk (Room Air)   SpO2 91   HR 84   Walk/Assistance Device Ambulation   Rate of Dyspnea 0   During Walk (On O2)   SpO2   (NA)   Need Additional O2 Flow Rate Rows No   After Walk   SpO2 94  (ON ra)   HR 87   Does the Patient Qualify for Home O2 No   Does the Patient Need Portable Oxygen Tanks No

## 2025-02-07 NOTE — H&P
Oral, DAILY    rosuvastatin (CRESTOR) 5 mg, Oral, DAILY    triamcinolone (KENALOG) 0.1 % cream Apply topically 2 times daily.    vitamin D (CHOLECALCIFEROL) 1,000 Units, Oral, DAILY    warfarin (COUMADIN) 2.5 mg, Oral, DAILY, .Or as directed.       Objective:   Patient Vitals for the past 24 hrs:   Temp Pulse Resp BP SpO2   02/07/25 0348 98.9 °F (37.2 °C) 70 20 129/61 94 %   02/06/25 2208 -- 78 20 -- 100 %   02/06/25 2203 99.1 °F (37.3 °C) 74 20 (!) 128/51 100 %          Estimated body mass index is 30.18 kg/m² as calculated from the following:    Height as of this encounter: 1.676 m (5' 6\").    Weight as of this encounter: 84.8 kg (187 lb).  No intake or output data in the 24 hours ending 02/07/25 0421      Physical Exam:  General:    Well nourished.  No overt distress  Head:  Normocephalic, atraumatic  Eyes:  Sclerae appear normal.  Pupils equally round.    HENT:  Nares appear normal, no drainage.  Moist mucous membranes  Neck:  No restricted ROM.  Trachea midline  CV:   RRR.  S1/S2 auscultated.  Valves auscultated   Lungs:   CTAB.   Appears even, unlabored  Abdomen:   Bowel sounds present.  Soft, nontender, nondistended.    Extremities: Warm and dry.  No cyanosis or clubbing.  No edema.    Skin:     No rashes.  Normal turgor.  Normal coloration  Neuro:  Cranial nerves II-XII grossly intact.   Sensation intact  Psych:  Normal mood and affect.  Alert and oriented x3    Data Ordered and Personally Reviewed:    Last 24hr Labs:  Recent Results (from the past 24 hour(s))   Influenza A/B, Molecular    Collection Time: 02/06/25  4:39 PM    Specimen: Nasopharyngeal   Result Value Ref Range    Influenza A, COLETTE Detected (A) NOTD      Influenza B, COLETTE Not detected NOTD     COVID-19, Rapid    Collection Time: 02/06/25  4:39 PM    Specimen: Nasopharyngeal   Result Value Ref Range    Source Nasopharyngeal      SARS-CoV-2, Rapid Not detected NOTD     EKG 12 Lead (SOB)    Collection Time: 02/06/25  5:14 PM   Result Value Ref

## 2025-02-07 NOTE — ASSESSMENT & PLAN NOTE
- Likely multifactorial, however symptoms most likely related to flu A infection  - As symptoms started <48hr ago and patient has h/o heart disease, will start Tamiflu  - Will also start IV lasix for diuresis  - Wean O2 as tolerated

## 2025-02-07 NOTE — PROGRESS NOTES
ACUTE PHYSICAL THERAPY GOALS:   (Developed with and agreed upon by patient and/or caregiver.)      LTG:  (1.)Ms. Morel will move from supine to sit and sit to supine  in bed with INDEPENDENT within 1 treatment day(s).  -GOAL MET 2/7/25    (2.)Ms. Morel will transfer from bed to chair and chair to bed with INDEPENDENT using the least restrictive device within 1 treatment day(s).  -GOAL MET 2/7/25    (3.)Ms. Morel will ambulate with INDEPENDENT for 50 feet with the least restrictive device within 1 treatment day(s). -GOAL MET 2/7/25    ________________________________________________________________________________________________     PHYSICAL THERAPY Initial Assessment, Discharge, and AM  (Link to Caseload Tracking: PT Visit Days : 1  Acknowledge Orders  Time In/Out  PT Charge Capture  Rehab Caseload Tracker    Jayashree Morel is a 79 y.o. female   PRIMARY DIAGNOSIS: Dyspnea  Dyspnea [R06.00]       Reason for Referral: Generalized Muscle Weakness (M62.81)  Observation: Payor: MEDICARE / Plan: MEDICARE PART A AND B / Product Type: *No Product type* /     ASSESSMENT:     REHAB RECOMMENDATIONS:   Recommendation to date pending progress:  Setting:  No further skilled physical therapy after discharge from hospital    Equipment:    None     ASSESSMENT:  Ms. Morel presents this admission with weakness and fatigue affecting her functional endurance due to Flu A. She normally lives with her daughter and grandson in a one story home and she is independent with all functional mobility. She will return home with family and should have no skilled needs at discharge. She will be discharged from PT at this time due to being independent ad marcelo in the room.   She was side lying on contact and agreeable to therapy. Transferred supine to sit and the stood independently. She ambulated in the room approximately 40-50 ft without any assistance then transferred into the bedside chair where we set up her lunch tray to

## 2025-02-07 NOTE — PLAN OF CARE
Problem: Discharge Planning  Goal: Discharge to home or other facility with appropriate resources  2/7/2025 1350 by Kiesha Chacon RN  Outcome: Progressing  2/7/2025 0512 by Dirk Taylor RN  Outcome: Progressing     Problem: Safety - Adult  Goal: Free from fall injury  2/7/2025 1350 by Kiesha Chacon, RN  Outcome: Progressing  2/7/2025 0512 by Dirk Taylor RN  Outcome: Progressing     Problem: ABCDS Injury Assessment  Goal: Absence of physical injury  2/7/2025 1350 by Kiesha Chacon RN  Outcome: Progressing  2/7/2025 0512 by Dirk Taylor RN  Outcome: Progressing

## 2025-02-07 NOTE — DISCHARGE SUMMARY
Absolute 0.46 (L) 0.50 - 4.60 K/UL    Monocytes Absolute 0.55 0.10 - 1.30 K/UL    Eosinophils Absolute 0.02 0.00 - 0.80 K/UL    Basophils Absolute 0.01 0.00 - 0.20 K/UL    Immature Granulocytes Absolute 0.01 0.0 - 0.5 K/UL   CMP    Collection Time: 02/06/25  5:19 PM   Result Value Ref Range    Sodium 138 133 - 143 mmol/L    Potassium 4.0 3.5 - 5.1 mmol/L    Chloride 104 98 - 107 mmol/L    CO2 26 20 - 29 mmol/L    Anion Gap 8 7 - 16 mmol/L    Glucose 114 (H) 65 - 100 mg/dL    BUN 18 8 - 23 MG/DL    Creatinine 0.76 (L) 0.80 - 1.30 MG/DL    Est, Glom Filt Rate 80 >60 ml/min/1.73m2    Calcium 9.1 8.8 - 10.2 MG/DL    Total Bilirubin 0.7 0.0 - 1.2 MG/DL    ALT 13 12 - 65 U/L    AST 25 15 - 37 U/L    Alk Phosphatase 105 (H) 35 - 104 U/L    Total Protein 7.2 6.3 - 8.2 g/dL    Albumin 4.2 3.2 - 4.6 g/dL    Globulin 3.0 2.3 - 3.5 g/dL    Albumin/Globulin Ratio 1.4 1.0 - 1.9     Magnesium    Collection Time: 02/06/25  5:19 PM   Result Value Ref Range    Magnesium 2.1 1.8 - 2.4 mg/dL   Troponin    Collection Time: 02/06/25  5:19 PM   Result Value Ref Range    Troponin T 15.5 (H) 0 - 14 ng/L   Brain Natriuretic Peptide    Collection Time: 02/06/25  5:19 PM   Result Value Ref Range    NT Pro-BNP 3,299 (H) 0 - 450 PG/ML   Troponin    Collection Time: 02/06/25  6:44 PM   Result Value Ref Range    Troponin T 15.1 (H) 0 - 14 ng/L   Protime-INR    Collection Time: 02/06/25 10:51 PM   Result Value Ref Range    Protime 27.6 (H) 11.3 - 14.9 sec    INR 2.5     CBC with Auto Differential    Collection Time: 02/07/25  5:44 AM   Result Value Ref Range    WBC 5.5 4.3 - 11.1 K/uL    RBC 3.29 (L) 4.05 - 5.2 M/uL    Hemoglobin 10.0 (L) 11.7 - 15.4 g/dL    Hematocrit 30.2 (L) 35.8 - 46.3 %    MCV 91.8 82.0 - 102.0 FL    MCH 30.4 26.1 - 32.9 PG    MCHC 33.1 31.4 - 35.0 g/dL    RDW 13.0 11.9 - 14.6 %    Platelets 136 (L) 150 - 450 K/uL    MPV 10.2 9.4 - 12.3 FL    nRBC 0.00 0.0 - 0.2 K/uL    Differential Type AUTOMATED      Neutrophils % 79.2 (H)  atraumatic  Eyes:  Sclerae appear normal.  Pupils equally round.    HENT:  Nares appear normal, no drainage.  Moist mucous membranes  Neck:  Trachea midline  CV:   RRR.  No m/r/g.  No JVD  Lungs:   CTAB.  No wheezing, rhonchi, or rales.  Respirations even, unlabored  Abdomen:   Soft, nontender, nondistended.    Extremities: Warm and dry.   No edema.    Skin:     No rashes.  Normal coloration  Neuro:  CN II-XII grossly intact.  Psych:  Normal mood and affect.        Time spent in patient discharge and coordination 30 minutes.      Signed:  DOUGLAS Hill    Part of this note may have been written by using a voice dictation software.  The note has been proof read but may still contain some grammatical/other typographical errors.

## 2025-02-07 NOTE — ASSESSMENT & PLAN NOTE
- Flu A positive  - Tamiflu as symptoms started <48hrs ago and patient has h/o concurrent heart disease  - Symptomatic treatment

## 2025-02-07 NOTE — PROGRESS NOTES
TRANSFER - IN REPORT:    Verbal report received from Kiesha HERNANDEZ on Jayashree Morel  being received from ED for routine progression of care      Report consisted of patient's Situation, Background, Assessment and   Recommendations(SBAR).     Information from the following report(s) SBAR, ED Summary, MAR, and Recent Results was reviewed with the receiving nurse.    Opportunity for questions and clarification was provided.      Assessment completed upon patient's arrival to unit and care assumed.

## 2025-02-07 NOTE — PROGRESS NOTES
Warfarin dosing per pharmacist    Jayashree Morel is a 79 y.o. female.    Height: 167.6 cm (5' 6\")  Weight - Scale: 84.8 kg (187 lb)    Indication:  afib, mMVR    Goal INR:  2.5 - 3.5    Home dose:  5mg Fri and 2.5mg all other days    Risk factors or significant drug interactions:  none    Other anticoagulants:  none    Lab Results   Component Value Date/Time    HGB 10.1 02/06/2025 05:19 PM    HGB 10.9 10/30/2024 09:34 AM    HGB 11.0 03/04/2024 11:54 AM       Daily Monitoring  Date  INR     Warfarin dose Notes  2/6  2.5  2.5mg         Pharmacy has been consulted to manage warfarin for Ms. Morel during this admission.    Patient presented to the hospital with complaint nasal and chest congestion, chills, sinus headache and body aches.      Outpatient warfarin regimen was confirmed with the patient by the RN caring for her.  Indication, INR goal and regimen were also confirmed via review of the patient's chart and anticoagulation clinic notes.  Last reported warfarin dose was 2/5.    INR is therapeutic on admission at 2.5.  Will give patient's usual dose of 2.5mg tonight.    Daily INRs ordered.  Pharmacy will continue to follow and adjust as clinically indicated.  Please call with any questions.    Thank you,  Jeanie [facility]

## 2025-02-07 NOTE — CARE COORDINATION
Patient with discharge orders for today.  Per PT/OT no needs.  Patient does not qualify for home oxygen. No additional needs made known to CM. Patient has met all treatment goals and milestones for discharge. Family to provide transportation home. CM following until patient is discharged.        02/07/25 1501   Services At/After Discharge   Transition of Care Consult (CM Consult) N/A   Services At/After Discharge None   [place] Resource Information Provided? No   Mode of Transport at Discharge Other (see comment)  (Family)   Confirm Follow Up Transport Family   Condition of Participation: Discharge Planning   The Plan for Transition of Care is related to the following treatment goals: Patient to DC home and return to baseline function.   The Patient and/or Patient Representative was provided with a Choice of Provider? Patient   The Patient and/Or Patient Representative agree with the Discharge Plan? Yes   Freedom of Choice list was provided with basic dialogue that supports the patient's individualized plan of care/goals, treatment preferences, and shares the quality data associated with the providers?  Yes

## 2025-02-08 VITALS
HEIGHT: 66 IN | OXYGEN SATURATION: 92 % | SYSTOLIC BLOOD PRESSURE: 111 MMHG | DIASTOLIC BLOOD PRESSURE: 52 MMHG | BODY MASS INDEX: 30.05 KG/M2 | WEIGHT: 187 LBS | RESPIRATION RATE: 18 BRPM | HEART RATE: 70 BPM | TEMPERATURE: 97.9 F

## 2025-02-08 LAB
ANION GAP SERPL CALC-SCNC: 13 MMOL/L (ref 7–16)
BASOPHILS # BLD: 0.02 K/UL (ref 0–0.2)
BASOPHILS NFR BLD: 0.7 % (ref 0–2)
BUN SERPL-MCNC: 22 MG/DL (ref 8–23)
CALCIUM SERPL-MCNC: 9 MG/DL (ref 8.8–10.2)
CHLORIDE SERPL-SCNC: 99 MMOL/L (ref 98–107)
CO2 SERPL-SCNC: 28 MMOL/L (ref 20–29)
CREAT SERPL-MCNC: 0.98 MG/DL (ref 0.6–1.1)
DIFFERENTIAL METHOD BLD: ABNORMAL
EOSINOPHIL # BLD: 0.05 K/UL (ref 0–0.8)
EOSINOPHIL NFR BLD: 1.8 % (ref 0.5–7.8)
ERYTHROCYTE [DISTWIDTH] IN BLOOD BY AUTOMATED COUNT: 12.9 % (ref 11.9–14.6)
GLUCOSE SERPL-MCNC: 98 MG/DL (ref 70–99)
HCT VFR BLD AUTO: 31.9 % (ref 35.8–46.3)
HGB BLD-MCNC: 10.3 G/DL (ref 11.7–15.4)
IMM GRANULOCYTES # BLD AUTO: 0.01 K/UL (ref 0–0.5)
IMM GRANULOCYTES NFR BLD AUTO: 0.4 % (ref 0–5)
INR PPP: 2.9
LYMPHOCYTES # BLD: 0.59 K/UL (ref 0.5–4.6)
LYMPHOCYTES NFR BLD: 21.2 % (ref 13–44)
MCH RBC QN AUTO: 29.4 PG (ref 26.1–32.9)
MCHC RBC AUTO-ENTMCNC: 32.3 G/DL (ref 31.4–35)
MCV RBC AUTO: 91.1 FL (ref 82–102)
MONOCYTES # BLD: 0.52 K/UL (ref 0.1–1.3)
MONOCYTES NFR BLD: 18.7 % (ref 4–12)
NEUTS SEG # BLD: 1.59 K/UL (ref 1.7–8.2)
NEUTS SEG NFR BLD: 57.2 % (ref 43–78)
NRBC # BLD: 0 K/UL (ref 0–0.2)
PLATELET # BLD AUTO: 153 K/UL (ref 150–450)
PMV BLD AUTO: 10.3 FL (ref 9.4–12.3)
POTASSIUM SERPL-SCNC: 3.6 MMOL/L (ref 3.5–5.1)
PROTHROMBIN TIME: 30.7 SEC (ref 11.3–14.9)
RBC # BLD AUTO: 3.5 M/UL (ref 4.05–5.2)
SODIUM SERPL-SCNC: 140 MMOL/L (ref 136–145)
WBC # BLD AUTO: 2.8 K/UL (ref 4.3–11.1)

## 2025-02-08 PROCEDURE — 85610 PROTHROMBIN TIME: CPT

## 2025-02-08 PROCEDURE — 85025 COMPLETE CBC W/AUTO DIFF WBC: CPT

## 2025-02-08 PROCEDURE — 6360000002 HC RX W HCPCS: Performed by: PHYSICIAN ASSISTANT

## 2025-02-08 PROCEDURE — 36415 COLL VENOUS BLD VENIPUNCTURE: CPT

## 2025-02-08 PROCEDURE — 80048 BASIC METABOLIC PNL TOTAL CA: CPT

## 2025-02-08 PROCEDURE — 96376 TX/PRO/DX INJ SAME DRUG ADON: CPT

## 2025-02-08 PROCEDURE — 6370000000 HC RX 637 (ALT 250 FOR IP)

## 2025-02-08 PROCEDURE — 6370000000 HC RX 637 (ALT 250 FOR IP): Performed by: FAMILY MEDICINE

## 2025-02-08 PROCEDURE — G0378 HOSPITAL OBSERVATION PER HR: HCPCS

## 2025-02-08 RX ADMIN — ROSUVASTATIN CALCIUM 5 MG: 5 TABLET, FILM COATED ORAL at 09:13

## 2025-02-08 RX ADMIN — CEPHALEXIN 250 MG: 250 CAPSULE ORAL at 06:18

## 2025-02-08 RX ADMIN — LEVOTHYROXINE SODIUM 50 MCG: 0.05 TABLET ORAL at 06:18

## 2025-02-08 RX ADMIN — ASPIRIN 81 MG: 81 TABLET, COATED ORAL at 09:13

## 2025-02-08 RX ADMIN — FUROSEMIDE 40 MG: 10 INJECTION, SOLUTION INTRAMUSCULAR; INTRAVENOUS at 09:13

## 2025-02-08 RX ADMIN — OSELTAMIVIR PHOSPHATE 30 MG: 30 CAPSULE ORAL at 09:13

## 2025-02-08 RX ADMIN — CARVEDILOL 3.12 MG: 3.12 TABLET, FILM COATED ORAL at 09:13

## 2025-02-08 RX ADMIN — POTASSIUM CHLORIDE 10 MEQ: 750 TABLET, EXTENDED RELEASE ORAL at 09:13

## 2025-02-08 NOTE — PLAN OF CARE
Problem: Discharge Planning  Goal: Discharge to home or other facility with appropriate resources  2/7/2025 1954 by Edith Brewer RN  Outcome: Progressing  Flowsheets (Taken 2/7/2025 1946)  Discharge to home or other facility with appropriate resources: Identify discharge learning needs (meds, wound care, etc)  2/7/2025 1350 by Kiehsa Chacon, RN  Outcome: Progressing     Problem: Safety - Adult  Goal: Free from fall injury  2/7/2025 1954 by Edith Brewer RN  Outcome: Progressing  2/7/2025 1350 by Kiesha Chacon, RN  Outcome: Progressing     Problem: ABCDS Injury Assessment  Goal: Absence of physical injury  2/7/2025 1954 by Edith Brewer RN  Outcome: Progressing  2/7/2025 1350 by Kiesha Chacon, RN  Outcome: Progressing

## 2025-02-08 NOTE — CARE COORDINATION
Pt is for discharge home today with family and no needs/supportive care orders recieved for MSW at this time.     02/07/25 1501   Services At/After Discharge   Transition of Care Consult (CM Consult) N/A   Services At/After Discharge None   [place] Resource Information Provided? No   Mode of Transport at Discharge Other (see comment)  (Family)   Confirm Follow Up Transport Family   Condition of Participation: Discharge Planning   The Plan for Transition of Care is related to the following treatment goals: Patient to DC home and return to baseline function.   The Patient and/or Patient Representative was provided with a Choice of Provider? Patient   The Patient and/Or Patient Representative agree with the Discharge Plan? Yes   Freedom of Choice list was provided with basic dialogue that supports the patient's individualized plan of care/goals, treatment preferences, and shares the quality data associated with the providers?  Yes

## 2025-02-08 NOTE — DISCHARGE SUMMARY
Hospitalist Discharge Summary   Admit Date:  2025 10:00 PM   DC Note date: 2025  Name:  Jayashree Morel   Age:  79 y.o.  Sex:  female  :  1945   MRN:  [id number]   Room:  [facility]  PCP:  Nelly Bañuelos MD    Presenting Complaint: No chief complaint on file.     Initial Admission Diagnosis: Dyspnea [R06.00]     Problem List for this Hospitalization (present on admission):    Principal Problem:    Dyspnea  Active Problems:    Chronic bacterial endocarditis    History of prosthetic heart valve    Cardiac pacemaker    Chronic systolic heart failure (HCC)    Permanent atrial fibrillation (HCC)    Long term (current) use of anticoagulants    Influenza A  Resolved Problems:    * No resolved hospital problems. *      Hospital Course:  Jayashree Morel is a 79 y.o. female with medical history of afib, CHF, heart valve replacements who presented to [facility] ED with URI symptoms.  Patient has chills, congestions, sinus pressure, and myalgias.  Symptoms started yesterday.  Upon ER evaluation, O2 sats 91-93% on RA, placed on O2 for comfort which improved sats to 99%.  pBNP is 3,299.  Troponins stable at 15.5, 15.1.  WBC is 6.  CXR shows:  IMPRESSION:  Cardiomegaly with pulmonary vascular congestion with additional  findings that appear to be stable since previously. Correlate.  Hospitalist asked to admit.  Patient transferred to [facility] for further care.     The patient was treated with Lasix and Tamiflu.  She was able to be weaned off of oxygen /.  Respiratory therapy walked her and she does not need any oxygen even with ambulation.  Denies any acute medical complaints at this time.  Specifically denies shortness of breath. She feels safe going home she has walked with therapy and did well and has no needs on discharge.  At this time patient is hemodynamically stable and safe for discharge home with PCP follow-up.  Discharge plan discussed with the patient she voices understanding

## 2025-02-10 ENCOUNTER — CARE COORDINATION (OUTPATIENT)
Dept: CARE COORDINATION | Facility: CLINIC | Age: 80
End: 2025-02-10

## 2025-02-10 DIAGNOSIS — R06.00 DYSPNEA, UNSPECIFIED TYPE: Primary | ICD-10-CM

## 2025-02-10 PROCEDURE — 1111F DSCHRG MED/CURRENT MED MERGE: CPT | Performed by: INTERNAL MEDICINE

## 2025-02-10 NOTE — CARE COORDINATION
Cardiology [phone]    2/18/2025 10:45 AM [facility] Cardiology [phone]    2/18/2025 11:00 AM Gaurav Grijalva MD Cardiology [phone]    5/6/2025 2:00 PM Nelly Bañuelos MD Internal Medicine [phone]            Care Transition Nurse provided contact information.  Plan for follow-up call in 6-10 days based on severity of symptoms and risk factors.  Plan for next call: symptom management-low oxygen saturations  self management-continue to assess for management of chronic disease.      Leanna Jackman, RN

## 2025-02-15 NOTE — PROGRESS NOTES
[facility] CARDIOLOGY  [address] [place] [address], [address]  [place] [address]  PHONE: [phone]        NAME:  Jayashree Morel  : 1945  MRN: [id number]     PCP:  Nelly Bañuelos MD      SUBJECTIVE:   Jayashree Morel is a 79 y.o. female seen for a follow up visit regarding the following:     Chief Complaint   Patient presents with    Chronic systolic heart failure (H    Chronic bacterial endocarditis     HPI:      2022 she was admitted with GI bleeding, anticoagulation held and upper endoscopy performed.       GI was consulted and she underwent emergent EGD.  She had endoclips x2 placed in the distal antrum of the stomach.  IR was also consulted and she underwent embolization of the gastrodueodenal artery.  Her hemoglobin has been stable lately.  She was discharged on protonix and will follow up with GI as an out patient. No obvious blood loss since discharge despite being on anticoagulation with permanent A-fib and mechanical valve.      She has been on chronic oral suppressive therapy for endocarditis with two valve replacements and tolerating it very well.  She denies fever, chills, or malaise since our last visit.  She has been vaccinated for Covid.        She has intermittent worsening of mild LE dependent edema but it responds well to PRN BID lasix for a few days.   She denies any CHF symptoms.        [facility] is satisfied that there is no occult worsening of potential endocarditis/bacteremia and she is following up with them only PRN now.  She is very happy with current results of chronic ABX suppressive therapy and doing well.        AF is permanent now but she is anticoagulated lifelong and clinically asymptomatic.  She needs surveillance echo yearly for now with mildly elevated but no change in aortic and mitral prosthetic gradients on last few serial studies.      Echo 2024:  Left Ventricle Normal left ventricular systolic function. EF by

## 2025-02-17 ENCOUNTER — CARE COORDINATION (OUTPATIENT)
Dept: CARE COORDINATION | Facility: CLINIC | Age: 80
End: 2025-02-17

## 2025-02-17 NOTE — CARE COORDINATION
Care Transitions Note    Follow Up Call     Patient Current Location:  Home: [address] [place] [phone]    LPN Care Coordinator contacted the patient by telephone. Verified name and  as identifiers.    Additional needs identified to be addressed with provider   No needs identified                 Method of communication with provider: none.    Care Summary Note: States she is doing well.  Has occasional lingering cough.  Denies fever, chill, shortness of breath, or chest pain.  Reports good appetite and hydration.  Has scheduled follow up cardiology appointment tomorrow on 2025 which she states she will be attending.  Denies complaints of pain, or constipation.  No complaints, needs, or concerns at this time.  Has contact information if needed, Hyacinth Maldonado [facility] [phone].    Plan of care updates since last contact:  Education: Hyacinth Maldonado SEGUNDO  [phone].  All scheduled appointments.        Advance Care Planning:   Does patient have an Advance Directive: patient declined education, states she has living will and her daughter has access to copy, does not wish for it to be part of her record.    Medication Review:  No changes since last call.     Remote Patient Monitoring:  Offered patient enrollment in the Remote Patient Monitoring (RPM) program for in-home monitoring: Yes, but did not enroll at this time: na .    Assessments:  No changes since last call    Follow Up Appointment:   Reviewed upcoming appointment(s).  Future Appointments         Provider Specialty Dept Phone    2025 10:30 AM [facility] DEVICE 39 Cardiology [phone]    2025 10:45 AM [facility] PT Cardiology [phone]    2025 11:00 AM Gaurav Grijalva MD Cardiology [phone]    2025 2:00 PM Nelly Bañuelos MD Internal Medicine [phone]            [facility] Care Coordinator provided contact information.  Plan for follow-up call in 6-10 days based on severity of

## 2025-02-18 ENCOUNTER — OFFICE VISIT (OUTPATIENT)
Age: 80
End: 2025-02-18
Payer: MEDICARE

## 2025-02-18 ENCOUNTER — ANTI-COAG VISIT (OUTPATIENT)
Age: 80
End: 2025-02-18

## 2025-02-18 ENCOUNTER — NURSE ONLY (OUTPATIENT)
Age: 80
End: 2025-02-18
Payer: MEDICARE

## 2025-02-18 VITALS
BODY MASS INDEX: 30.22 KG/M2 | WEIGHT: 188 LBS | SYSTOLIC BLOOD PRESSURE: 128 MMHG | DIASTOLIC BLOOD PRESSURE: 74 MMHG | HEART RATE: 80 BPM | HEIGHT: 66 IN

## 2025-02-18 DIAGNOSIS — Z95.2 HISTORY OF MITRAL VALVE REPLACEMENT WITH MECHANICAL VALVE: Primary | ICD-10-CM

## 2025-02-18 DIAGNOSIS — Z95.3 HISTORY OF AORTIC VALVE REPLACEMENT WITH BIOPROSTHETIC VALVE: ICD-10-CM

## 2025-02-18 DIAGNOSIS — Z79.01 LONG TERM (CURRENT) USE OF ANTICOAGULANTS: ICD-10-CM

## 2025-02-18 DIAGNOSIS — E78.5 DYSLIPIDEMIA: ICD-10-CM

## 2025-02-18 DIAGNOSIS — Z95.0 PACEMAKER: ICD-10-CM

## 2025-02-18 DIAGNOSIS — Z95.2 HISTORY OF MITRAL VALVE REPLACEMENT WITH MECHANICAL VALVE: ICD-10-CM

## 2025-02-18 DIAGNOSIS — I44.2 CHB (COMPLETE HEART BLOCK) (HCC): Primary | ICD-10-CM

## 2025-02-18 DIAGNOSIS — I48.21 PERMANENT ATRIAL FIBRILLATION (HCC): ICD-10-CM

## 2025-02-18 DIAGNOSIS — D68.69 HYPERCOAGULABLE STATE, SECONDARY: Chronic | ICD-10-CM

## 2025-02-18 DIAGNOSIS — I33.0 CHRONIC BACTERIAL ENDOCARDITIS: Primary | Chronic | ICD-10-CM

## 2025-02-18 DIAGNOSIS — I10 PRIMARY HYPERTENSION: ICD-10-CM

## 2025-02-18 DIAGNOSIS — I50.22 CHRONIC SYSTOLIC HEART FAILURE (HCC): ICD-10-CM

## 2025-02-18 DIAGNOSIS — Z86.79 HX OF BACTERIAL ENDOCARDITIS: ICD-10-CM

## 2025-02-18 LAB
POC INR: 6
PROTHROMBIN TIME, POC: ABNORMAL

## 2025-02-18 PROCEDURE — 1159F MED LIST DOCD IN RCRD: CPT | Performed by: INTERNAL MEDICINE

## 2025-02-18 PROCEDURE — G8427 DOCREV CUR MEDS BY ELIG CLIN: HCPCS | Performed by: INTERNAL MEDICINE

## 2025-02-18 PROCEDURE — 1090F PRES/ABSN URINE INCON ASSESS: CPT | Performed by: INTERNAL MEDICINE

## 2025-02-18 PROCEDURE — 1126F AMNT PAIN NOTED NONE PRSNT: CPT | Performed by: INTERNAL MEDICINE

## 2025-02-18 PROCEDURE — 93279 PRGRMG DEV EVAL PM/LDLS PM: CPT | Performed by: INTERNAL MEDICINE

## 2025-02-18 PROCEDURE — 3074F SYST BP LT 130 MM HG: CPT | Performed by: INTERNAL MEDICINE

## 2025-02-18 PROCEDURE — G8399 PT W/DXA RESULTS DOCUMENT: HCPCS | Performed by: INTERNAL MEDICINE

## 2025-02-18 PROCEDURE — 1123F ACP DISCUSS/DSCN MKR DOCD: CPT | Performed by: INTERNAL MEDICINE

## 2025-02-18 PROCEDURE — 1036F TOBACCO NON-USER: CPT | Performed by: INTERNAL MEDICINE

## 2025-02-18 PROCEDURE — G8417 CALC BMI ABV UP PARAM F/U: HCPCS | Performed by: INTERNAL MEDICINE

## 2025-02-18 PROCEDURE — 3078F DIAST BP <80 MM HG: CPT | Performed by: INTERNAL MEDICINE

## 2025-02-18 PROCEDURE — 99214 OFFICE O/P EST MOD 30 MIN: CPT | Performed by: INTERNAL MEDICINE

## 2025-02-18 NOTE — PROGRESS NOTES
Abnormal INR today of 6.0. Adjusting doses two days. Pt to hold tonight and tomorrow night instead of 2.5 mg. Then continue current maintenance plan (see Anticoag Dosing Calendar). INR to be rechecked on Friday 2/21/25. Reviewed by

## 2025-02-18 NOTE — PATIENT INSTRUCTIONS
Reminder: Please contact the Coumadin Clinic at [phone] when you have medication changes. Examples, new medications, antibiotics, discontinued medications, new supplements, missed doses of warfarin or if you took extra doses of warfarin.  This also includes OTC medications. Notifying us helps reduce the possibility of high and low INR's. In addition, if warfarin needs to be held for any procedures, please have surgeon or physician's office contact us before holding anticoagulant. Thanks, [facility] Cardiology Coumadin [facility].         Please go to the Emergency Department if you experience:  - Extreme shortness of breath or chest pain  - Red, warm, painful, swollen limb  - Numbness or tingling on one side of the body  - Slurred speech or major vision change  - Extreme headache

## 2025-02-21 ENCOUNTER — ANTI-COAG VISIT (OUTPATIENT)
Age: 80
End: 2025-02-21
Payer: MEDICARE

## 2025-02-21 DIAGNOSIS — I48.21 PERMANENT ATRIAL FIBRILLATION (HCC): ICD-10-CM

## 2025-02-21 DIAGNOSIS — Z79.01 LONG TERM (CURRENT) USE OF ANTICOAGULANTS: ICD-10-CM

## 2025-02-21 DIAGNOSIS — Z95.2 HISTORY OF MITRAL VALVE REPLACEMENT WITH MECHANICAL VALVE: Primary | ICD-10-CM

## 2025-02-21 DIAGNOSIS — Z95.3 HISTORY OF AORTIC VALVE REPLACEMENT WITH BIOPROSTHETIC VALVE: ICD-10-CM

## 2025-02-21 LAB
POC INR: 3.5
PROTHROMBIN TIME, POC: NORMAL

## 2025-02-21 PROCEDURE — 93793 ANTICOAG MGMT PT WARFARIN: CPT | Performed by: INTERNAL MEDICINE

## 2025-02-21 PROCEDURE — 85610 PROTHROMBIN TIME: CPT | Performed by: INTERNAL MEDICINE

## 2025-02-24 ENCOUNTER — CARE COORDINATION (OUTPATIENT)
Dept: CARE COORDINATION | Facility: CLINIC | Age: 80
End: 2025-02-24

## 2025-02-24 NOTE — CARE COORDINATION
Care Transitions Note    Follow Up Call     Patient Current Location:  Home: [address] [place] [phone]    LPN Care Coordinator contacted the patient by telephone. Verified name and  as identifiers.    Additional needs identified to be addressed with provider   No needs identified                 Method of communication with provider: none.    Care Summary Note: States she continues to do well.  Denies fever, chill, or shortness of breath, occasional cough 1-2 times daily.  Reports good appetite and adequate hydration.  Has attended scheduled follow up appointments.  No concerns or needs at this time.  Has contact information if needed.  Hyacinth Maldonado LPN -[phone].     Plan of care updates since last contact:  Education: Hyacinth Maldonado LPN -[phone].  All scheduled appointments.        Advance Care Planning:   Does patient have an Advance Directive: reviewed during previous call, see note. .    Medication Review:  No changes since last call.     Remote Patient Monitoring:  Offered patient enrollment in the Remote Patient Monitoring (RPM) program for in-home monitoring: Yes, but did not enroll at this time: na .    Assessments:  No changes since last call    Follow Up Appointment:   Reviewed upcoming appointment(s).  Future Appointments         Provider Specialty Dept Phone    3/21/2025 10:45 AM [facility] Cardiology [phone]    2025 2:00 PM Nelly Bañuelos MD Internal Medicine [phone]    2025 10:45 AM Gaurav Grijalva MD Cardiology [phone]            LPN Care Coordinator provided contact information.  Plan for follow-up call in 6-10 days based on severity of symptoms and risk factors.  Plan for next call: self management-diet, hydration, exercise, follow up appointments.       Hyacinth Maldonado LPN

## 2025-03-03 ENCOUNTER — CARE COORDINATION (OUTPATIENT)
Dept: CARE COORDINATION | Facility: CLINIC | Age: 80
End: 2025-03-03

## 2025-03-03 NOTE — CARE COORDINATION
Care Transitions Note    Follow Up Call     Patient Current Location:  Home: [address] [place] [phone]    N Care Coordinator contacted the patient by telephone. Verified name and  as identifiers.    Additional needs identified to be addressed with provider   No needs identified                 Method of communication with provider: none.    Care Summary Note: States she is doing very well, reports complete recovery.  Declines future follow up calls.  No needs at this time, has contact information if needed, Hyacinth Maldonado LPN  [phone].    Plan of care updates since last contact:  Education: Hyacinth Maldonado LPN -[phone].  All scheduled appointments.        Advance Care Planning:   Does patient have an Advance Directive: reviewed during previous call, see note. .    Medication Review:  No changes since last call.     Remote Patient Monitoring:  Offered patient enrollment in the Remote Patient Monitoring (RPM) program for in-home monitoring: Yes, but did not enroll at this time: na .    Assessments:  Declined further participation in outreach calls.  States she is recovered and appreciates follow up, but does not feel need to continue.     Follow Up Appointment:   Reviewed upcoming appointment(s).  Future Appointments         Provider Specialty Dept Phone    3/21/2025 10:45 AM [facility] Cardiology [phone]    2025 2:00 PM Nelly Bañuelos MD Internal Medicine [phone]    2025 10:45 AM Gaurav Grijalva MD Cardiology [phone]            LPN Care Coordinator provided contact information.  No further follow-up call indicated based on severity of symptoms and risk factors.  Plan for next call:  no future calls per patient request.       Hyacinth Maldonado LPN

## 2025-03-06 ENCOUNTER — OFFICE VISIT (OUTPATIENT)
Dept: ORTHOPEDIC SURGERY | Age: 80
End: 2025-03-06

## 2025-03-06 DIAGNOSIS — M67.813 BICEPS TENDONOSIS OF RIGHT SHOULDER: ICD-10-CM

## 2025-03-06 DIAGNOSIS — M25.511 RIGHT SHOULDER PAIN, UNSPECIFIED CHRONICITY: Primary | ICD-10-CM

## 2025-03-06 DIAGNOSIS — M75.51 SUBACROMIAL BURSITIS OF RIGHT SHOULDER JOINT: ICD-10-CM

## 2025-03-06 DIAGNOSIS — M75.01 ADHESIVE CAPSULITIS OF RIGHT SHOULDER: ICD-10-CM

## 2025-03-06 RX ORDER — METHYLPREDNISOLONE ACETATE 80 MG/ML
80 INJECTION, SUSPENSION INTRA-ARTICULAR; INTRALESIONAL; INTRAMUSCULAR; SOFT TISSUE ONCE
Status: COMPLETED | OUTPATIENT
Start: 2025-03-06 | End: 2025-03-06

## 2025-03-06 RX ADMIN — METHYLPREDNISOLONE ACETATE 80 MG: 80 INJECTION, SUSPENSION INTRA-ARTICULAR; INTRALESIONAL; INTRAMUSCULAR; SOFT TISSUE at 11:50

## 2025-03-06 NOTE — PROGRESS NOTES
Mild arthritis right shoulder        Assessment:     Adhesive capsulitis right shoulder  Biceps tendinosis right shoulder  Subacromial bursitis right shoulder      Medical Decision Making and Plan:    X-rays were reviewed    Chart reviewed   The patient has right shoulder pain with adhesive capsulitis, subacromial bursitis and biceps tendinosis.  She has an extensive cardiac history with CHF history of rheumatic fever multiple valve replacements and pacemaker.  She is not a candidate for operative intervention due to her underlying cardiac issues.  I offered her steroid injection of the right shoulder which she agreed to.  Will avoid oral anti-inflammatories with her.  Will refer her to physical therapy and follow-up in 6 to 8 weeks or sooner if needed.          PROCEDURE:  Depo-Medrol Injection right shoulder        The procedure was explained to the patient and possible adverse reactions were discussed.      TIME OUT performed immediately prior to start of procedure:   I, DOUGLAS Nino, have performed the following reviews on Jayashree Morel prior to the start of the procedure:            * Patient was identified by name and date of birth   * Agreement on procedure being performed was verified  * Risks and Benefits explained to the patient  * Procedure site verified and marked as necessary  * Patient was positioned for comfort    After the area was prepped and cleansed in sterile fashion with chlorhexidine and alcohol a solution of 4.5cc of 2% xylocaine, 4.5cc of 0.5% bupivacaine and 1cc of 80mg of depomedrol was injected into the  *Right Subacromial space   .     How tolerated by patient: tolerated the procedure well with no complications    Post injection instructions were given to Jayashree Morel:           Time spent in preparation, chart review, and direct patient care was 30  minutes    Electronically signed by:   DOUGLAS Nino  3/6/2025,  11:46 AM

## 2025-03-08 PROBLEM — J10.1 INFLUENZA A: Status: RESOLVED | Noted: 2025-02-06 | Resolved: 2025-03-08

## 2025-03-21 ENCOUNTER — ANTI-COAG VISIT (OUTPATIENT)
Age: 80
End: 2025-03-21
Payer: MEDICARE

## 2025-03-21 DIAGNOSIS — Z79.01 LONG TERM (CURRENT) USE OF ANTICOAGULANTS: ICD-10-CM

## 2025-03-21 DIAGNOSIS — Z95.3 HISTORY OF AORTIC VALVE REPLACEMENT WITH BIOPROSTHETIC VALVE: ICD-10-CM

## 2025-03-21 DIAGNOSIS — Z95.2 HISTORY OF MITRAL VALVE REPLACEMENT WITH MECHANICAL VALVE: Primary | ICD-10-CM

## 2025-03-21 DIAGNOSIS — I48.21 PERMANENT ATRIAL FIBRILLATION (HCC): ICD-10-CM

## 2025-03-21 LAB
POC INR: 2.9
PROTHROMBIN TIME, POC: NORMAL

## 2025-03-21 PROCEDURE — 93793 ANTICOAG MGMT PT WARFARIN: CPT | Performed by: INTERNAL MEDICINE

## 2025-03-21 PROCEDURE — 85610 PROTHROMBIN TIME: CPT | Performed by: INTERNAL MEDICINE

## 2025-04-01 DIAGNOSIS — E03.9 ACQUIRED HYPOTHYROIDISM: ICD-10-CM

## 2025-04-02 RX ORDER — LEVOTHYROXINE SODIUM 50 UG/1
50 TABLET ORAL DAILY
Qty: 90 TABLET | Refills: 1 | OUTPATIENT
Start: 2025-04-02

## 2025-04-03 DIAGNOSIS — E03.9 ACQUIRED HYPOTHYROIDISM: ICD-10-CM

## 2025-04-03 RX ORDER — LEVOTHYROXINE SODIUM 50 UG/1
50 TABLET ORAL DAILY
Qty: 90 TABLET | Refills: 1 | Status: SHIPPED | OUTPATIENT
Start: 2025-04-03

## 2025-04-14 RX ORDER — CARVEDILOL 3.12 MG/1
3.12 TABLET ORAL 2 TIMES DAILY WITH MEALS
Qty: 180 TABLET | Refills: 3 | Status: SHIPPED | OUTPATIENT
Start: 2025-04-14

## 2025-04-14 NOTE — TELEPHONE ENCOUNTER
Requested Prescriptions     Pending Prescriptions Disp Refills    carvedilol (COREG) 3.125 MG tablet 180 tablet 3     Sig: Take 1 tablet by mouth 2 times daily (with meals)       Verified rx. Last OV 02/18/25. Erx to pharm on file.

## 2025-04-17 ENCOUNTER — OFFICE VISIT (OUTPATIENT)
Dept: ORTHOPEDIC SURGERY | Age: 80
End: 2025-04-17
Payer: MEDICARE

## 2025-04-17 DIAGNOSIS — M25.511 RIGHT SHOULDER PAIN, UNSPECIFIED CHRONICITY: Primary | ICD-10-CM

## 2025-04-17 PROCEDURE — 1036F TOBACCO NON-USER: CPT | Performed by: PHYSICIAN ASSISTANT

## 2025-04-17 PROCEDURE — G8428 CUR MEDS NOT DOCUMENT: HCPCS | Performed by: PHYSICIAN ASSISTANT

## 2025-04-17 PROCEDURE — 1123F ACP DISCUSS/DSCN MKR DOCD: CPT | Performed by: PHYSICIAN ASSISTANT

## 2025-04-17 PROCEDURE — G8399 PT W/DXA RESULTS DOCUMENT: HCPCS | Performed by: PHYSICIAN ASSISTANT

## 2025-04-17 PROCEDURE — G8417 CALC BMI ABV UP PARAM F/U: HCPCS | Performed by: PHYSICIAN ASSISTANT

## 2025-04-17 PROCEDURE — 99212 OFFICE O/P EST SF 10 MIN: CPT | Performed by: PHYSICIAN ASSISTANT

## 2025-04-17 PROCEDURE — 1090F PRES/ABSN URINE INCON ASSESS: CPT | Performed by: PHYSICIAN ASSISTANT

## 2025-04-17 NOTE — PROGRESS NOTES
[facility]          Patient ID:  Name: Jayashree Morel  AGE/Gender: 79 y.o. female  MRN: [id number]  : 1945    Date of Consultation:  2025          ALLERGIES:   Allergies   Allergen Reactions    Paxlovid [Nirmatrelvir-Ritonavir] Other (See Comments)     DO NOT GIVE HER PAXLOVID DUE TO CARDIAC ISSUES AND MECHANICAL HEART VALVE - CONTRAINDICATED IN HER CASE-  CHENG ROSA MD          Penicillins Rash        CC: recheck right shoulder    History:  The patient presents today for recheck of the right shoulder. The patient received a steroid injection and was referred to PT. she says this did the trick on her shoulder is not bothering her anymore..       Physical Exam:     General:  On exam the patient is a pleasant 79 y.o. female in no acute distress, A&O x 3.  HEENT: NC/AT, PERRL   Psych: normal mood, normal affect  Lungs: respirations even, normal breath sounds  MSK: Active forward flexion to 180 degrees, internal rotation to T6, external rotation 70 degrees  Vascular: No distal edema or clubbing, Distal pulses are intact  Neurologic: Normal.         Assessment:     Resolved subacromial bursitis  Resolved adhesive capsulitis  Resolved biceps tendinosis      Medical Decision Making and Plan:    Chart reviewed  The patient is doing well with regard to the right shoulder having resolved her shoulder pain.  She will continue the physical therapy exercises on her own and follow-up with us on as-needed basis.       Time spent in preparation, chart review, and direct patient care was 10 minutes    Electronically signed by:   DOUGLAS Nino  2025,  1:27 PM

## 2025-04-18 ENCOUNTER — ANTI-COAG VISIT (OUTPATIENT)
Age: 80
End: 2025-04-18
Payer: MEDICARE

## 2025-04-18 DIAGNOSIS — Z95.2 HISTORY OF MITRAL VALVE REPLACEMENT WITH MECHANICAL VALVE: Primary | ICD-10-CM

## 2025-04-18 DIAGNOSIS — Z79.01 LONG TERM (CURRENT) USE OF ANTICOAGULANTS: ICD-10-CM

## 2025-04-18 DIAGNOSIS — Z95.3 HISTORY OF AORTIC VALVE REPLACEMENT WITH BIOPROSTHETIC VALVE: ICD-10-CM

## 2025-04-18 DIAGNOSIS — I48.21 PERMANENT ATRIAL FIBRILLATION (HCC): ICD-10-CM

## 2025-04-18 LAB
POC INR: 4
PROTHROMBIN TIME, POC: NORMAL

## 2025-04-18 PROCEDURE — 93793 ANTICOAG MGMT PT WARFARIN: CPT | Performed by: INTERNAL MEDICINE

## 2025-04-18 PROCEDURE — 85610 PROTHROMBIN TIME: CPT | Performed by: INTERNAL MEDICINE

## 2025-04-22 ASSESSMENT — PATIENT HEALTH QUESTIONNAIRE - PHQ9
SUM OF ALL RESPONSES TO PHQ QUESTIONS 1-9: 0
2. FEELING DOWN, DEPRESSED OR HOPELESS: NOT AT ALL
1. LITTLE INTEREST OR PLEASURE IN DOING THINGS: NOT AT ALL
1. LITTLE INTEREST OR PLEASURE IN DOING THINGS: NOT AT ALL
SUM OF ALL RESPONSES TO PHQ QUESTIONS 1-9: 0
SUM OF ALL RESPONSES TO PHQ9 QUESTIONS 1 & 2: 0
SUM OF ALL RESPONSES TO PHQ QUESTIONS 1-9: 0
2. FEELING DOWN, DEPRESSED OR HOPELESS: NOT AT ALL
SUM OF ALL RESPONSES TO PHQ QUESTIONS 1-9: 0

## 2025-04-23 ENCOUNTER — OFFICE VISIT (OUTPATIENT)
Dept: INTERNAL MEDICINE CLINIC | Facility: CLINIC | Age: 80
End: 2025-04-23
Payer: MEDICARE

## 2025-04-23 VITALS
WEIGHT: 181.2 LBS | BODY MASS INDEX: 29.12 KG/M2 | DIASTOLIC BLOOD PRESSURE: 60 MMHG | HEIGHT: 66 IN | SYSTOLIC BLOOD PRESSURE: 120 MMHG

## 2025-04-23 DIAGNOSIS — Z79.01 WARFARIN ANTICOAGULATION: ICD-10-CM

## 2025-04-23 DIAGNOSIS — C44.609 SKIN CANCER OF ARM, LEFT: ICD-10-CM

## 2025-04-23 DIAGNOSIS — I48.21 PERMANENT ATRIAL FIBRILLATION (HCC): Primary | ICD-10-CM

## 2025-04-23 DIAGNOSIS — I10 PRIMARY HYPERTENSION: ICD-10-CM

## 2025-04-23 PROCEDURE — G8427 DOCREV CUR MEDS BY ELIG CLIN: HCPCS | Performed by: INTERNAL MEDICINE

## 2025-04-23 PROCEDURE — 1090F PRES/ABSN URINE INCON ASSESS: CPT | Performed by: INTERNAL MEDICINE

## 2025-04-23 PROCEDURE — G8399 PT W/DXA RESULTS DOCUMENT: HCPCS | Performed by: INTERNAL MEDICINE

## 2025-04-23 PROCEDURE — G8417 CALC BMI ABV UP PARAM F/U: HCPCS | Performed by: INTERNAL MEDICINE

## 2025-04-23 PROCEDURE — 1160F RVW MEDS BY RX/DR IN RCRD: CPT | Performed by: INTERNAL MEDICINE

## 2025-04-23 PROCEDURE — 1123F ACP DISCUSS/DSCN MKR DOCD: CPT | Performed by: INTERNAL MEDICINE

## 2025-04-23 PROCEDURE — 3074F SYST BP LT 130 MM HG: CPT | Performed by: INTERNAL MEDICINE

## 2025-04-23 PROCEDURE — 1159F MED LIST DOCD IN RCRD: CPT | Performed by: INTERNAL MEDICINE

## 2025-04-23 PROCEDURE — 3078F DIAST BP <80 MM HG: CPT | Performed by: INTERNAL MEDICINE

## 2025-04-23 PROCEDURE — 1036F TOBACCO NON-USER: CPT | Performed by: INTERNAL MEDICINE

## 2025-04-23 PROCEDURE — 99214 OFFICE O/P EST MOD 30 MIN: CPT | Performed by: INTERNAL MEDICINE

## 2025-04-23 NOTE — PROGRESS NOTES
HPI: Jayashree Morel (: 1945)    Rapidly growing skin lesion on left forearm -nonhealing and raised   Continues to increase in size      Problem List:  Patient Active Problem List   Diagnosis   • Chronic bacterial endocarditis   • Pacemaker   • History of mitral valve replacement with mechanical valve   • History of prosthetic heart valve   • Cardiac pacemaker   • Chronic a-fib (HCC)   • Tricuspid valve disorder   • History of aortic valve replacement with bioprosthetic valve   • Chronic systolic heart failure (HCC)   • Hx of bacterial endocarditis   • Chronic renal failure   • Permanent atrial fibrillation (HCC)   • Hypertension   • Long term (current) use of anticoagulants   • Dyslipidemia   • Warfarin anticoagulation   • Hypercoagulable state, secondary   • Dyspnea       History:  Past Medical History:   Diagnosis Date   • Abnormal Pap smear of cervix     [facility] procedure in her 20s   • Arrhythmia    • Arthritis    • Bacteremia    • Cardiac pacemaker 7/15/2016   • Chronic atrial fibrillation (HCC) 2016   • Chronic renal failure    • Chronic systolic heart failure (HCC)    • Dyslipidemia    • Endocarditis    • Heart failure    • History of aortic valve replacement with bioprosthetic valve 7/15/2016   • History of prosthetic heart valve     Bovine aortic valve   • Hx of bacterial endocarditis    • Hypertension    • Mechanical heart valve present     mechanical mitral valve   • Menopause     AGE 51   • Pacemaker 2010   • Permanent atrial fibrillation (HCC)    • Rheumatic fever    • Sleep apnea    • Tricuspid valve disorder        Allergies:  Allergies   Allergen Reactions   • Paxlovid [Nirmatrelvir-Ritonavir] Other (See Comments)     DO NOT GIVE HER PAXLOVID DUE TO CARDIAC ISSUES AND MECHANICAL HEART VALVE - CONTRAINDICATED IN HER CASE-  CHENG ROSA MD         • Penicillins Rash       Current Medications:  Current Outpatient Medications   Medication Sig Dispense

## 2025-04-24 ENCOUNTER — TRANSCRIBE ORDERS (OUTPATIENT)
Dept: SCHEDULING | Age: 80
End: 2025-04-24

## 2025-04-24 DIAGNOSIS — Z12.31 OTHER SCREENING MAMMOGRAM: Primary | ICD-10-CM

## 2025-04-28 ENCOUNTER — APPOINTMENT (OUTPATIENT)
Dept: URBAN - METROPOLITAN AREA CLINIC 329 | Facility: CLINIC | Age: 80
Setting detail: DERMATOLOGY
End: 2025-04-28

## 2025-04-28 DIAGNOSIS — Z12.83 ENCOUNTER FOR SCREENING FOR MALIGNANT NEOPLASM OF SKIN: ICD-10-CM

## 2025-04-28 DIAGNOSIS — Z71.89 OTHER SPECIFIED COUNSELING: ICD-10-CM

## 2025-04-28 DIAGNOSIS — L57.8 OTHER SKIN CHANGES DUE TO CHRONIC EXPOSURE TO NONIONIZING RADIATION: ICD-10-CM

## 2025-04-28 DIAGNOSIS — D485 NEOPLASM OF UNCERTAIN BEHAVIOR OF SKIN: ICD-10-CM

## 2025-04-28 DIAGNOSIS — L57.0 ACTINIC KERATOSIS: ICD-10-CM

## 2025-04-28 DIAGNOSIS — L81.4 OTHER MELANIN HYPERPIGMENTATION: ICD-10-CM

## 2025-04-28 PROBLEM — D48.5 NEOPLASM OF UNCERTAIN BEHAVIOR OF SKIN: Status: ACTIVE | Noted: 2025-04-28

## 2025-04-28 PROCEDURE — ? BIOPSY BY SHAVE METHOD

## 2025-04-28 PROCEDURE — 17003 DESTRUCT PREMALG LES 2-14: CPT

## 2025-04-28 PROCEDURE — ? TREATMENT REGIMEN

## 2025-04-28 PROCEDURE — 17000 DESTRUCT PREMALG LESION: CPT | Mod: 59

## 2025-04-28 PROCEDURE — ? LIQUID NITROGEN

## 2025-04-28 PROCEDURE — 11102 TANGNTL BX SKIN SINGLE LES: CPT

## 2025-04-28 PROCEDURE — ? FULL BODY SKIN EXAM - DECLINED

## 2025-04-28 PROCEDURE — ? COUNSELING

## 2025-04-28 PROCEDURE — 99203 OFFICE O/P NEW LOW 30 MIN: CPT | Mod: 25

## 2025-04-28 PROCEDURE — ? REFERRAL CORRESPONDENCE

## 2025-04-28 ASSESSMENT — LOCATION SIMPLE DESCRIPTION DERM
LOCATION SIMPLE: LEFT FOREARM
LOCATION SIMPLE: SCALP
LOCATION SIMPLE: POSTERIOR SCALP
LOCATION SIMPLE: RIGHT FOREHEAD

## 2025-04-28 ASSESSMENT — LOCATION DETAILED DESCRIPTION DERM
LOCATION DETAILED: POSTERIOR MID-PARIETAL SCALP
LOCATION DETAILED: LEFT DISTAL DORSAL FOREARM
LOCATION DETAILED: LEFT PROXIMAL DORSAL FOREARM
LOCATION DETAILED: RIGHT MEDIAL FOREHEAD
LOCATION DETAILED: LEFT SUPERIOR PARIETAL SCALP

## 2025-04-28 ASSESSMENT — LOCATION ZONE DERM
LOCATION ZONE: ARM
LOCATION ZONE: FACE
LOCATION ZONE: SCALP

## 2025-05-02 ENCOUNTER — ANTI-COAG VISIT (OUTPATIENT)
Age: 80
End: 2025-05-02

## 2025-05-02 DIAGNOSIS — Z95.2 HISTORY OF MITRAL VALVE REPLACEMENT WITH MECHANICAL VALVE: Primary | ICD-10-CM

## 2025-05-02 DIAGNOSIS — I48.21 PERMANENT ATRIAL FIBRILLATION (HCC): ICD-10-CM

## 2025-05-02 DIAGNOSIS — Z79.01 LONG TERM (CURRENT) USE OF ANTICOAGULANTS: ICD-10-CM

## 2025-05-02 DIAGNOSIS — Z95.3 HISTORY OF AORTIC VALVE REPLACEMENT WITH BIOPROSTHETIC VALVE: ICD-10-CM

## 2025-05-02 LAB
POC INR: 2.8
PROTHROMBIN TIME, POC: NORMAL

## 2025-05-03 DIAGNOSIS — E78.5 DYSLIPIDEMIA: ICD-10-CM

## 2025-05-03 RX ORDER — ROSUVASTATIN CALCIUM 5 MG/1
5 TABLET, COATED ORAL DAILY
Qty: 90 TABLET | Refills: 3 | OUTPATIENT
Start: 2025-05-03

## 2025-05-05 DIAGNOSIS — I48.21 PERMANENT ATRIAL FIBRILLATION (HCC): Primary | ICD-10-CM

## 2025-05-05 DIAGNOSIS — E03.9 ACQUIRED HYPOTHYROIDISM: ICD-10-CM

## 2025-05-05 DIAGNOSIS — I10 PRIMARY HYPERTENSION: ICD-10-CM

## 2025-05-05 DIAGNOSIS — I50.22 CHRONIC SYSTOLIC HEART FAILURE (HCC): ICD-10-CM

## 2025-05-05 DIAGNOSIS — E78.5 DYSLIPIDEMIA: ICD-10-CM

## 2025-05-06 ENCOUNTER — OFFICE VISIT (OUTPATIENT)
Dept: INTERNAL MEDICINE CLINIC | Facility: CLINIC | Age: 80
End: 2025-05-06
Payer: MEDICARE

## 2025-05-06 ENCOUNTER — APPOINTMENT (OUTPATIENT)
Dept: URBAN - METROPOLITAN AREA CLINIC 329 | Facility: CLINIC | Age: 80
Setting detail: DERMATOLOGY
End: 2025-05-06

## 2025-05-06 VITALS
HEIGHT: 66 IN | SYSTOLIC BLOOD PRESSURE: 126 MMHG | DIASTOLIC BLOOD PRESSURE: 62 MMHG | BODY MASS INDEX: 29.09 KG/M2 | WEIGHT: 181 LBS

## 2025-05-06 DIAGNOSIS — E03.9 ACQUIRED HYPOTHYROIDISM: ICD-10-CM

## 2025-05-06 DIAGNOSIS — I10 PRIMARY HYPERTENSION: ICD-10-CM

## 2025-05-06 DIAGNOSIS — E78.5 DYSLIPIDEMIA: ICD-10-CM

## 2025-05-06 DIAGNOSIS — K21.9 GASTROESOPHAGEAL REFLUX DISEASE WITHOUT ESOPHAGITIS: ICD-10-CM

## 2025-05-06 DIAGNOSIS — Z95.0 PACEMAKER: ICD-10-CM

## 2025-05-06 DIAGNOSIS — Z79.01 LONG TERM (CURRENT) USE OF ANTICOAGULANTS: ICD-10-CM

## 2025-05-06 DIAGNOSIS — D68.69 HYPERCOAGULABLE STATE, SECONDARY: Chronic | ICD-10-CM

## 2025-05-06 DIAGNOSIS — I48.20 CHRONIC A-FIB (HCC): Primary | ICD-10-CM

## 2025-05-06 PROBLEM — C44.629 SQUAMOUS CELL CARCINOMA OF SKIN OF LEFT UPPER LIMB, INCLUDING SHOULDER: Status: ACTIVE | Noted: 2025-05-06

## 2025-05-06 PROCEDURE — 3078F DIAST BP <80 MM HG: CPT | Performed by: INTERNAL MEDICINE

## 2025-05-06 PROCEDURE — 99214 OFFICE O/P EST MOD 30 MIN: CPT | Performed by: INTERNAL MEDICINE

## 2025-05-06 PROCEDURE — 1123F ACP DISCUSS/DSCN MKR DOCD: CPT | Performed by: INTERNAL MEDICINE

## 2025-05-06 PROCEDURE — 17263 DSTRJ MAL LES T/A/L 2.1-3.0: CPT | Mod: 79

## 2025-05-06 PROCEDURE — G8399 PT W/DXA RESULTS DOCUMENT: HCPCS | Performed by: INTERNAL MEDICINE

## 2025-05-06 PROCEDURE — 1036F TOBACCO NON-USER: CPT | Performed by: INTERNAL MEDICINE

## 2025-05-06 PROCEDURE — G8427 DOCREV CUR MEDS BY ELIG CLIN: HCPCS | Performed by: INTERNAL MEDICINE

## 2025-05-06 PROCEDURE — ? CURETTAGE AND DESTRUCTION

## 2025-05-06 PROCEDURE — 1159F MED LIST DOCD IN RCRD: CPT | Performed by: INTERNAL MEDICINE

## 2025-05-06 PROCEDURE — 1090F PRES/ABSN URINE INCON ASSESS: CPT | Performed by: INTERNAL MEDICINE

## 2025-05-06 PROCEDURE — 1160F RVW MEDS BY RX/DR IN RCRD: CPT | Performed by: INTERNAL MEDICINE

## 2025-05-06 PROCEDURE — G8417 CALC BMI ABV UP PARAM F/U: HCPCS | Performed by: INTERNAL MEDICINE

## 2025-05-06 PROCEDURE — 3074F SYST BP LT 130 MM HG: CPT | Performed by: INTERNAL MEDICINE

## 2025-05-06 ASSESSMENT — ENCOUNTER SYMPTOMS: SHORTNESS OF BREATH: 1

## 2025-05-06 NOTE — PROCEDURE: CURETTAGE AND DESTRUCTION
Body Location Override (Optional - Billing Will Still Be Based On Selected Body Map Location If Applicable): left distal dorsal forearm
Detail Level: Detailed
Number Of Curettages: 3
Size Of Lesion In Cm: 0.8
Size Of Lesion After Curettage: 2.1
Add Intralesional Injection: No
Concentration (Mg/Ml Or Millions Of Plaque Forming Units/Cc): 0.01
Total Volume (Ccs): 1
Anesthesia Type: 1% lidocaine with epinephrine
Cautery Type: electrodesiccation
What Was Performed First?: Curettage
Final Size Statement: The size of the lesion after curettage was
Additional Information: (Optional): The wound was cleaned, and a pressure dressing was applied.  The patient received detailed post-op instructions.
Consent was obtained from the patient. The risks, benefits and alternatives to therapy were discussed in detail. Specifically, the risks of infection, scarring, bleeding, prolonged wound healing, nerve injury, incomplete removal, allergy to anesthesia and recurrence were addressed. Alternatives to ED&C, such as: surgical removal and XRT were also discussed.  Prior to the procedure, the treatment site was clearly identified and confirmed by the patient.
Post-Care Instructions: I reviewed with the patient in detail post-care instructions. Patient is to keep the area dry for 48 hours, and not to engage in any swimming until the area is healed. Should the patient develop any fevers, chills, bleeding, severe pain patient will contact the office immediately.
Bill As A Line Item Or As Units: Line Item

## 2025-05-06 NOTE — PROGRESS NOTES
reactive to light.   Cardiovascular:      Rate and Rhythm: Normal rate and regular rhythm.      Heart sounds: Normal heart sounds.      Comments: Valve tones  Pulmonary:      Effort: Pulmonary effort is normal.      Breath sounds: Normal breath sounds.   Musculoskeletal:         General: Normal range of motion.      Cervical back: Normal range of motion and neck supple.   Skin:     General: Skin is warm and dry.   Neurological:      General: No focal deficit present.      Mental Status: She is alert and oriented to person, place, and time.   Psychiatric:         Mood and Affect: Mood normal.         Behavior: Behavior normal.         Thought Content: Thought content normal.         Judgment: Judgment normal.        Assessment/Plan:   [place] was seen today for follow-up.    Diagnoses and all orders for this visit:    Chronic a-fib (HCC)    Hypercoagulable state, secondary    Pacemaker    Primary hypertension    Long term (current) use of anticoagulants    Dyslipidemia    Acquired hypothyroidism    Gastroesophageal reflux disease without esophagitis      Will return for labs    With fatigue will check thyroid     BP is controlled    Up to date on Echo and normal EF  Continue to f/u with Dr Grijalva    Continue to get coag check     Pacemaker /chronic afib     On statin therapy with Crestor    SCC cancer left forearm- continue to follow with a Dermatologist regularly  As risk for skin cancers        Current medications are therapeutic at this time; continue as prescribed.        Nelly Bañuelos MD

## 2025-05-07 DIAGNOSIS — I50.22 CHRONIC SYSTOLIC HEART FAILURE (HCC): ICD-10-CM

## 2025-05-07 DIAGNOSIS — E78.5 DYSLIPIDEMIA: ICD-10-CM

## 2025-05-07 DIAGNOSIS — E03.9 ACQUIRED HYPOTHYROIDISM: ICD-10-CM

## 2025-05-07 LAB
ALBUMIN SERPL-MCNC: 3.9 G/DL (ref 3.2–4.6)
ALBUMIN/GLOB SERPL: 1.3 (ref 1–1.9)
ALP SERPL-CCNC: 83 U/L (ref 35–104)
ALT SERPL-CCNC: 13 U/L (ref 8–45)
ANION GAP SERPL CALC-SCNC: 9 MMOL/L (ref 7–16)
AST SERPL-CCNC: 27 U/L (ref 15–37)
BASOPHILS # BLD: 0.02 K/UL (ref 0–0.2)
BASOPHILS NFR BLD: 0.4 % (ref 0–2)
BILIRUB SERPL-MCNC: 0.7 MG/DL (ref 0–1.2)
BUN SERPL-MCNC: 22 MG/DL (ref 8–23)
CALCIUM SERPL-MCNC: 9.3 MG/DL (ref 8.8–10.2)
CHLORIDE SERPL-SCNC: 104 MMOL/L (ref 98–107)
CHOLEST SERPL-MCNC: 155 MG/DL (ref 0–200)
CO2 SERPL-SCNC: 28 MMOL/L (ref 20–29)
CREAT SERPL-MCNC: 1.05 MG/DL (ref 0.6–1.1)
DIFFERENTIAL METHOD BLD: ABNORMAL
EOSINOPHIL # BLD: 0.14 K/UL (ref 0–0.8)
EOSINOPHIL NFR BLD: 2.9 % (ref 0.5–7.8)
ERYTHROCYTE [DISTWIDTH] IN BLOOD BY AUTOMATED COUNT: 13.2 % (ref 11.9–14.6)
GLOBULIN SER CALC-MCNC: 3.1 G/DL (ref 2.3–3.5)
GLUCOSE SERPL-MCNC: 89 MG/DL (ref 70–99)
HCT VFR BLD AUTO: 35.5 % (ref 35.8–46.3)
HDLC SERPL-MCNC: 51 MG/DL (ref 40–60)
HDLC SERPL: 3.1 (ref 0–5)
HGB BLD-MCNC: 11.4 G/DL (ref 11.7–15.4)
IMM GRANULOCYTES # BLD AUTO: 0.01 K/UL (ref 0–0.5)
IMM GRANULOCYTES NFR BLD AUTO: 0.2 % (ref 0–5)
LDLC SERPL CALC-MCNC: 79 MG/DL (ref 0–100)
LYMPHOCYTES # BLD: 0.85 K/UL (ref 0.5–4.6)
LYMPHOCYTES NFR BLD: 17.5 % (ref 13–44)
MCH RBC QN AUTO: 31.1 PG (ref 26.1–32.9)
MCHC RBC AUTO-ENTMCNC: 32.1 G/DL (ref 31.4–35)
MCV RBC AUTO: 97 FL (ref 82–102)
MONOCYTES # BLD: 0.42 K/UL (ref 0.1–1.3)
MONOCYTES NFR BLD: 8.7 % (ref 4–12)
NEUTS SEG # BLD: 3.41 K/UL (ref 1.7–8.2)
NEUTS SEG NFR BLD: 70.3 % (ref 43–78)
NRBC # BLD: 0 K/UL (ref 0–0.2)
PLATELET # BLD AUTO: 171 K/UL (ref 150–450)
PMV BLD AUTO: 11.4 FL (ref 9.4–12.3)
POTASSIUM SERPL-SCNC: 4.1 MMOL/L (ref 3.5–5.1)
PROT SERPL-MCNC: 7 G/DL (ref 6.3–8.2)
RBC # BLD AUTO: 3.66 M/UL (ref 4.05–5.2)
SODIUM SERPL-SCNC: 140 MMOL/L (ref 136–145)
TRIGL SERPL-MCNC: 129 MG/DL (ref 0–150)
TSH, 3RD GENERATION: 1.55 UIU/ML (ref 0.27–4.2)
VLDLC SERPL CALC-MCNC: 26 MG/DL (ref 6–23)
WBC # BLD AUTO: 4.9 K/UL (ref 4.3–11.1)

## 2025-05-07 RX ORDER — ROSUVASTATIN CALCIUM 5 MG/1
5 TABLET, COATED ORAL DAILY
Qty: 90 TABLET | Refills: 3 | OUTPATIENT
Start: 2025-05-07

## 2025-05-08 ENCOUNTER — RESULTS FOLLOW-UP (OUTPATIENT)
Dept: INTERNAL MEDICINE CLINIC | Facility: CLINIC | Age: 80
End: 2025-05-08

## 2025-05-12 DIAGNOSIS — E78.5 DYSLIPIDEMIA: ICD-10-CM

## 2025-05-13 RX ORDER — ROSUVASTATIN CALCIUM 5 MG/1
5 TABLET, COATED ORAL DAILY
Qty: 90 TABLET | Refills: 2 | Status: SHIPPED | OUTPATIENT
Start: 2025-05-13

## 2025-05-29 ENCOUNTER — HOSPITAL ENCOUNTER (OUTPATIENT)
Dept: MAMMOGRAPHY | Age: 80
Discharge: HOME | End: 2025-05-29
Attending: INTERNAL MEDICINE
Payer: MEDICARE

## 2025-05-29 DIAGNOSIS — Z12.31 OTHER SCREENING MAMMOGRAM: ICD-10-CM

## 2025-05-29 PROCEDURE — 77063 BREAST TOMOSYNTHESIS BI: CPT

## 2025-05-30 ENCOUNTER — ANTI-COAG VISIT (OUTPATIENT)
Age: 80
End: 2025-05-30

## 2025-05-30 DIAGNOSIS — I48.21 PERMANENT ATRIAL FIBRILLATION (HCC): ICD-10-CM

## 2025-05-30 DIAGNOSIS — Z79.01 LONG TERM (CURRENT) USE OF ANTICOAGULANTS: ICD-10-CM

## 2025-05-30 DIAGNOSIS — Z95.3 HISTORY OF AORTIC VALVE REPLACEMENT WITH BIOPROSTHETIC VALVE: ICD-10-CM

## 2025-05-30 DIAGNOSIS — Z95.2 HISTORY OF MITRAL VALVE REPLACEMENT WITH MECHANICAL VALVE: Primary | ICD-10-CM

## 2025-05-30 LAB
POC INR: 3.5
PROTHROMBIN TIME, POC: NORMAL

## 2025-06-03 ENCOUNTER — RESULTS FOLLOW-UP (OUTPATIENT)
Dept: INTERNAL MEDICINE CLINIC | Facility: CLINIC | Age: 80
End: 2025-06-03

## 2025-07-02 ENCOUNTER — APPOINTMENT (OUTPATIENT)
Dept: URBAN - METROPOLITAN AREA CLINIC 329 | Facility: CLINIC | Age: 80
Setting detail: DERMATOLOGY
End: 2025-07-02

## 2025-07-02 DIAGNOSIS — L57.8 OTHER SKIN CHANGES DUE TO CHRONIC EXPOSURE TO NONIONIZING RADIATION: ICD-10-CM | Status: STABLE

## 2025-07-02 DIAGNOSIS — D22 MELANOCYTIC NEVI: ICD-10-CM

## 2025-07-02 DIAGNOSIS — Z85.828 PERSONAL HISTORY OF OTHER MALIGNANT NEOPLASM OF SKIN: ICD-10-CM

## 2025-07-02 DIAGNOSIS — Z71.89 OTHER SPECIFIED COUNSELING: ICD-10-CM

## 2025-07-02 DIAGNOSIS — L81.4 OTHER MELANIN HYPERPIGMENTATION: ICD-10-CM

## 2025-07-02 DIAGNOSIS — L82.1 OTHER SEBORRHEIC KERATOSIS: ICD-10-CM

## 2025-07-02 DIAGNOSIS — D18.0 HEMANGIOMA: ICD-10-CM

## 2025-07-02 DIAGNOSIS — Z12.83 ENCOUNTER FOR SCREENING FOR MALIGNANT NEOPLASM OF SKIN: ICD-10-CM

## 2025-07-02 DIAGNOSIS — D485 NEOPLASM OF UNCERTAIN BEHAVIOR OF SKIN: ICD-10-CM

## 2025-07-02 PROBLEM — D18.01 HEMANGIOMA OF SKIN AND SUBCUTANEOUS TISSUE: Status: ACTIVE | Noted: 2025-07-02

## 2025-07-02 PROBLEM — D22.5 MELANOCYTIC NEVI OF TRUNK: Status: ACTIVE | Noted: 2025-07-02

## 2025-07-02 PROBLEM — D22.71 MELANOCYTIC NEVI OF RIGHT LOWER LIMB, INCLUDING HIP: Status: ACTIVE | Noted: 2025-07-02

## 2025-07-02 PROBLEM — D48.5 NEOPLASM OF UNCERTAIN BEHAVIOR OF SKIN: Status: ACTIVE | Noted: 2025-07-02

## 2025-07-02 PROBLEM — D22.61 MELANOCYTIC NEVI OF RIGHT UPPER LIMB, INCLUDING SHOULDER: Status: ACTIVE | Noted: 2025-07-02

## 2025-07-02 PROCEDURE — ? ADDITIONAL NOTES

## 2025-07-02 PROCEDURE — ? FULL BODY SKIN EXAM

## 2025-07-02 PROCEDURE — ? BIOPSY BY SHAVE METHOD

## 2025-07-02 PROCEDURE — ? DERMATOSCOPIC EVALUATION

## 2025-07-02 PROCEDURE — ? COUNSELING

## 2025-07-02 PROCEDURE — ? SUNSCREEN RECOMMENDATIONS

## 2025-07-02 PROCEDURE — ? TREATMENT REGIMEN

## 2025-07-02 ASSESSMENT — LOCATION DETAILED DESCRIPTION DERM
LOCATION DETAILED: RIGHT DISTAL PRETIBIAL REGION
LOCATION DETAILED: MIDDLE STERNUM
LOCATION DETAILED: LEFT MEDIAL UPPER BACK
LOCATION DETAILED: RIGHT PROXIMAL DORSAL FOREARM
LOCATION DETAILED: RIGHT ANTERIOR PROXIMAL UPPER ARM
LOCATION DETAILED: EPIGASTRIC SKIN
LOCATION DETAILED: RIGHT INFERIOR MEDIAL UPPER BACK
LOCATION DETAILED: RIGHT ANTERIOR DISTAL THIGH
LOCATION DETAILED: RIGHT PROXIMAL POSTERIOR UPPER ARM
LOCATION DETAILED: LEFT INFERIOR ANTERIOR NECK
LOCATION DETAILED: LEFT DISTAL PRETIBIAL REGION
LOCATION DETAILED: RIGHT POSTERIOR ANKLE
LOCATION DETAILED: RIGHT SUPERIOR MEDIAL UPPER BACK
LOCATION DETAILED: RIGHT DISTAL POSTERIOR THIGH

## 2025-07-02 ASSESSMENT — LOCATION SIMPLE DESCRIPTION DERM
LOCATION SIMPLE: RIGHT FOREARM
LOCATION SIMPLE: RIGHT UPPER ARM
LOCATION SIMPLE: RIGHT POSTERIOR THIGH
LOCATION SIMPLE: LEFT ANTERIOR NECK
LOCATION SIMPLE: CHEST
LOCATION SIMPLE: RIGHT UPPER BACK
LOCATION SIMPLE: RIGHT POSTERIOR UPPER ARM
LOCATION SIMPLE: RIGHT ANKLE
LOCATION SIMPLE: RIGHT THIGH
LOCATION SIMPLE: ABDOMEN
LOCATION SIMPLE: LEFT PRETIBIAL REGION
LOCATION SIMPLE: RIGHT PRETIBIAL REGION
LOCATION SIMPLE: LEFT UPPER BACK

## 2025-07-02 ASSESSMENT — LOCATION ZONE DERM
LOCATION ZONE: TRUNK
LOCATION ZONE: LEG
LOCATION ZONE: ARM
LOCATION ZONE: NECK

## 2025-07-02 NOTE — HPI: SKIN LESION
What Type Of Note Output Would You Prefer (Optional)?: Bullet Format
How Severe Is Your Skin Lesion?: mild
Is This A New Presentation, Or A Follow-Up?: Skin Lesions
Which Family Member (Optional)?: Father
Additional History: Patient states that she has noticed a new lesion on the back of the left leg. She explains that she has noticed the lesion about a month ago and she described that it started of small as a skin tag and has now grown. It presents as rough and raised and non healing.

## 2025-07-07 ENCOUNTER — ANTI-COAG VISIT (OUTPATIENT)
Age: 80
End: 2025-07-07
Payer: MEDICARE

## 2025-07-07 DIAGNOSIS — Z79.01 LONG TERM (CURRENT) USE OF ANTICOAGULANTS: ICD-10-CM

## 2025-07-07 DIAGNOSIS — Z95.2 HISTORY OF MITRAL VALVE REPLACEMENT WITH MECHANICAL VALVE: Primary | ICD-10-CM

## 2025-07-07 DIAGNOSIS — I48.21 PERMANENT ATRIAL FIBRILLATION (HCC): ICD-10-CM

## 2025-07-07 DIAGNOSIS — Z95.3 HISTORY OF AORTIC VALVE REPLACEMENT WITH BIOPROSTHETIC VALVE: ICD-10-CM

## 2025-07-07 LAB
POC INR: 3.5
PROTHROMBIN TIME, POC: NORMAL

## 2025-07-07 PROCEDURE — 93793 ANTICOAG MGMT PT WARFARIN: CPT | Performed by: INTERNAL MEDICINE

## 2025-07-07 PROCEDURE — 85610 PROTHROMBIN TIME: CPT | Performed by: INTERNAL MEDICINE

## 2025-07-09 DIAGNOSIS — I33.0 CHRONIC BACTERIAL ENDOCARDITIS: ICD-10-CM

## 2025-07-10 RX ORDER — CEFADROXIL 500 MG/1
500 CAPSULE ORAL 2 TIMES DAILY
Qty: 180 CAPSULE | Refills: 3 | Status: SHIPPED | OUTPATIENT
Start: 2025-07-10

## 2025-07-10 NOTE — TELEPHONE ENCOUNTER
Requested Prescriptions     Pending Prescriptions Disp Refills    cefadroxil (DURICEF) 500 MG capsule [Pharmacy Med Name: CEFADROXIL 500 MG CAP] 180 capsule 3     Sig: TAKE ONE CAPSULE BY MOUTH TWICE A DAY

## 2025-08-11 ENCOUNTER — ANTI-COAG VISIT (OUTPATIENT)
Age: 80
End: 2025-08-11
Payer: MEDICARE

## 2025-08-11 DIAGNOSIS — I48.21 PERMANENT ATRIAL FIBRILLATION (HCC): ICD-10-CM

## 2025-08-11 DIAGNOSIS — Z79.01 LONG TERM (CURRENT) USE OF ANTICOAGULANTS: ICD-10-CM

## 2025-08-11 DIAGNOSIS — Z95.2 HISTORY OF MITRAL VALVE REPLACEMENT WITH MECHANICAL VALVE: Primary | ICD-10-CM

## 2025-08-11 DIAGNOSIS — Z95.3 HISTORY OF AORTIC VALVE REPLACEMENT WITH BIOPROSTHETIC VALVE: ICD-10-CM

## 2025-08-11 LAB
POC INR: 3.7
PROTHROMBIN TIME, POC: NORMAL

## 2025-08-11 PROCEDURE — 85610 PROTHROMBIN TIME: CPT | Performed by: INTERNAL MEDICINE

## 2025-08-11 PROCEDURE — 93793 ANTICOAG MGMT PT WARFARIN: CPT | Performed by: INTERNAL MEDICINE

## 2025-08-21 ENCOUNTER — TELEPHONE (OUTPATIENT)
Dept: INTERNAL MEDICINE CLINIC | Facility: CLINIC | Age: 80
End: 2025-08-21

## 2025-08-25 ENCOUNTER — OFFICE VISIT (OUTPATIENT)
Age: 80
End: 2025-08-25
Payer: MEDICARE

## 2025-08-25 ENCOUNTER — ANTI-COAG VISIT (OUTPATIENT)
Age: 80
End: 2025-08-25
Payer: MEDICARE

## 2025-08-25 VITALS
HEIGHT: 66 IN | DIASTOLIC BLOOD PRESSURE: 74 MMHG | BODY MASS INDEX: 29.41 KG/M2 | HEART RATE: 72 BPM | WEIGHT: 183 LBS | SYSTOLIC BLOOD PRESSURE: 102 MMHG

## 2025-08-25 DIAGNOSIS — I50.22 CHRONIC SYSTOLIC HEART FAILURE (HCC): Primary | ICD-10-CM

## 2025-08-25 DIAGNOSIS — Z79.01 LONG TERM (CURRENT) USE OF ANTICOAGULANTS: ICD-10-CM

## 2025-08-25 DIAGNOSIS — I48.20 CHRONIC A-FIB (HCC): ICD-10-CM

## 2025-08-25 DIAGNOSIS — Z95.2 HISTORY OF MITRAL VALVE REPLACEMENT WITH MECHANICAL VALVE: Primary | ICD-10-CM

## 2025-08-25 DIAGNOSIS — I48.21 PERMANENT ATRIAL FIBRILLATION (HCC): ICD-10-CM

## 2025-08-25 DIAGNOSIS — Z95.2 HISTORY OF MITRAL VALVE REPLACEMENT WITH MECHANICAL VALVE: ICD-10-CM

## 2025-08-25 DIAGNOSIS — Z95.3 HISTORY OF AORTIC VALVE REPLACEMENT WITH BIOPROSTHETIC VALVE: ICD-10-CM

## 2025-08-25 DIAGNOSIS — Z95.0 CARDIAC PACEMAKER: ICD-10-CM

## 2025-08-25 DIAGNOSIS — I10 PRIMARY HYPERTENSION: ICD-10-CM

## 2025-08-25 DIAGNOSIS — I33.0 CHRONIC BACTERIAL ENDOCARDITIS: Chronic | ICD-10-CM

## 2025-08-25 LAB
POC INR: 3.9
PROTHROMBIN TIME, POC: NORMAL

## 2025-08-25 PROCEDURE — 1036F TOBACCO NON-USER: CPT | Performed by: INTERNAL MEDICINE

## 2025-08-25 PROCEDURE — 1159F MED LIST DOCD IN RCRD: CPT | Performed by: INTERNAL MEDICINE

## 2025-08-25 PROCEDURE — 85610 PROTHROMBIN TIME: CPT | Performed by: INTERNAL MEDICINE

## 2025-08-25 PROCEDURE — G8399 PT W/DXA RESULTS DOCUMENT: HCPCS | Performed by: INTERNAL MEDICINE

## 2025-08-25 PROCEDURE — 3078F DIAST BP <80 MM HG: CPT | Performed by: INTERNAL MEDICINE

## 2025-08-25 PROCEDURE — 1160F RVW MEDS BY RX/DR IN RCRD: CPT | Performed by: INTERNAL MEDICINE

## 2025-08-25 PROCEDURE — G8427 DOCREV CUR MEDS BY ELIG CLIN: HCPCS | Performed by: INTERNAL MEDICINE

## 2025-08-25 PROCEDURE — 3074F SYST BP LT 130 MM HG: CPT | Performed by: INTERNAL MEDICINE

## 2025-08-25 PROCEDURE — G8417 CALC BMI ABV UP PARAM F/U: HCPCS | Performed by: INTERNAL MEDICINE

## 2025-08-25 PROCEDURE — 99214 OFFICE O/P EST MOD 30 MIN: CPT | Performed by: INTERNAL MEDICINE

## 2025-08-25 PROCEDURE — 1123F ACP DISCUSS/DSCN MKR DOCD: CPT | Performed by: INTERNAL MEDICINE

## 2025-08-25 PROCEDURE — 1090F PRES/ABSN URINE INCON ASSESS: CPT | Performed by: INTERNAL MEDICINE

## 2025-08-26 ENCOUNTER — OFFICE VISIT (OUTPATIENT)
Dept: INTERNAL MEDICINE CLINIC | Facility: CLINIC | Age: 80
End: 2025-08-26

## 2025-08-26 VITALS
OXYGEN SATURATION: 99 % | BODY MASS INDEX: 29.06 KG/M2 | HEART RATE: 75 BPM | SYSTOLIC BLOOD PRESSURE: 110 MMHG | DIASTOLIC BLOOD PRESSURE: 62 MMHG | WEIGHT: 180.8 LBS | HEIGHT: 66 IN

## 2025-08-26 DIAGNOSIS — L98.9 SKIN LESION: ICD-10-CM

## 2025-08-26 DIAGNOSIS — M54.50 CHRONIC BILATERAL LOW BACK PAIN WITHOUT SCIATICA: Primary | Chronic | ICD-10-CM

## 2025-08-26 DIAGNOSIS — I10 PRIMARY HYPERTENSION: ICD-10-CM

## 2025-08-26 DIAGNOSIS — G47.33 OSA (OBSTRUCTIVE SLEEP APNEA): Chronic | ICD-10-CM

## 2025-08-26 DIAGNOSIS — G89.29 CHRONIC BILATERAL LOW BACK PAIN WITHOUT SCIATICA: Primary | Chronic | ICD-10-CM

## 2025-08-26 DIAGNOSIS — I48.20 CHRONIC A-FIB (HCC): Chronic | ICD-10-CM

## 2025-08-26 DIAGNOSIS — R40.0 DAYTIME SOMNOLENCE: ICD-10-CM

## 2025-08-26 RX ORDER — SUCRALFATE 1 G/1
TABLET ORAL
COMMUNITY
Start: 2025-05-22

## 2025-08-26 RX ORDER — MUPIROCIN 2 %
OINTMENT (GRAM) TOPICAL
Qty: 30 G | Refills: 0 | Status: SHIPPED | OUTPATIENT
Start: 2025-08-26

## 2025-09-02 RX ORDER — WARFARIN SODIUM 5 MG/1
TABLET ORAL
Qty: 90 TABLET | Refills: 3 | Status: SHIPPED | OUTPATIENT
Start: 2025-09-02

## 2025-09-03 DIAGNOSIS — I50.22 CHRONIC SYSTOLIC HEART FAILURE (HCC): ICD-10-CM

## 2025-09-03 RX ORDER — FUROSEMIDE 20 MG/1
20 TABLET ORAL DAILY
Qty: 90 TABLET | Refills: 3 | Status: SHIPPED | OUTPATIENT
Start: 2025-09-03

## (undated) DEVICE — SYRINGE, LUER SLIP, STERILE, 60ML: Brand: MEDLINE

## (undated) DEVICE — AIRLIFE™ OXYGEN TUBING 7 FEET (2.1 M) CRUSH RESISTANT OXYGEN TUBING, VINYL TIPPED: Brand: AIRLIFE™

## (undated) DEVICE — CONNECTOR TBNG OD5-7MM O2 END DISP

## (undated) DEVICE — BLOCK BITE AD 60FR W/ VELC STRP ADDRESSES MOST PT AND

## (undated) DEVICE — CLIP LIG L235CM RESOL 360 BX/20

## (undated) DEVICE — YANKAUER,BULB TIP,W/O VENT,RIGID,STERILE: Brand: MEDLINE

## (undated) DEVICE — KENDALL RADIOLUCENT FOAM MONITORING ELECTRODE RECTANGULAR SHAPE: Brand: KENDALL

## (undated) DEVICE — NEEDLE SYR 18GA L1.5IN RED PLAS HUB S STL BLNT FILL W/O

## (undated) DEVICE — LUBE JELLY FOIL PACK 1.4 OZ: Brand: MEDLINE INDUSTRIES, INC.

## (undated) DEVICE — CANNULA NSL ORAL AD FOR CAPNOFLEX CO2 O2 AIRLFE

## (undated) DEVICE — SINGLE PORT MANIFOLD: Brand: NEPTUNE 2

## (undated) DEVICE — GAUZE,SPONGE,4"X4",12PLY,WOVEN,NS,LF: Brand: MEDLINE

## (undated) DEVICE — SYRINGE MED 10ML LUERLOCK TIP W/O SFTY DISP

## (undated) DEVICE — SYRINGE MED 3ML CLR PLAS STD N CTRL LUERLOCK TIP DISP